# Patient Record
Sex: FEMALE | Race: BLACK OR AFRICAN AMERICAN | NOT HISPANIC OR LATINO | Employment: FULL TIME | URBAN - METROPOLITAN AREA
[De-identification: names, ages, dates, MRNs, and addresses within clinical notes are randomized per-mention and may not be internally consistent; named-entity substitution may affect disease eponyms.]

---

## 2017-01-31 RX ORDER — GABAPENTIN 300 MG/1
CAPSULE ORAL
Qty: 180 CAPSULE | Refills: 3 | Status: SHIPPED | OUTPATIENT
Start: 2017-01-31 | End: 2017-12-14 | Stop reason: SDUPTHER

## 2017-04-21 DIAGNOSIS — K21.9 GASTROESOPHAGEAL REFLUX DISEASE, ESOPHAGITIS PRESENCE NOT SPECIFIED: ICD-10-CM

## 2017-04-21 RX ORDER — OMEPRAZOLE 20 MG/1
CAPSULE, DELAYED RELEASE ORAL
Qty: 90 CAPSULE | Refills: 3 | Status: SHIPPED | OUTPATIENT
Start: 2017-04-21 | End: 2019-08-15 | Stop reason: SDUPTHER

## 2017-06-15 DIAGNOSIS — Z12.31 VISIT FOR SCREENING MAMMOGRAM: Primary | ICD-10-CM

## 2017-06-27 ENCOUNTER — HOSPITAL ENCOUNTER (OUTPATIENT)
Dept: RADIOLOGY | Facility: HOSPITAL | Age: 57
Discharge: HOME | End: 2017-06-27
Attending: SPECIALIST
Payer: COMMERCIAL

## 2017-06-27 VITALS — HEIGHT: 60 IN | WEIGHT: 141 LBS | BODY MASS INDEX: 27.68 KG/M2

## 2017-06-27 DIAGNOSIS — Z12.31 VISIT FOR SCREENING MAMMOGRAM: ICD-10-CM

## 2017-06-27 PROCEDURE — 77067 SCR MAMMO BI INCL CAD: CPT | Mod: TC

## 2017-06-27 PROCEDURE — 77067 SCR MAMMO BI INCL CAD: CPT | Mod: 26,,, | Performed by: RADIOLOGY

## 2017-07-21 ENCOUNTER — TELEPHONE (OUTPATIENT)
Dept: GASTROENTEROLOGY | Facility: CLINIC | Age: 57
End: 2017-07-21

## 2017-07-21 NOTE — TELEPHONE ENCOUNTER
Spoke to Ms. Neal    Reports she has been taking omeprazole x 3 years  States she believes it is giving her softer stools in the past month  Reports after taking it in the morning she also has some abdominal discomfort    Reports 5 soft bowel movements per day with no blood noted  Denies fever/chills/nausea/vomitting/swelling/SOB    Pt reports she has increased her fiber intake with wheat bread and oatmeal    Pt would like to know if she should decrease her fiber or stop taking the omeprazole as she believes it is causing change in her stools.    Offered pt to be seen by DANII, however pt declined and states she only wants to see her doctor (Dr. Davenport).    Next available PM appt booked per pt's request for October 13th at 4pm.      Pt aware Dr. Davenport is out of the office until Monday.   Directions given, Appt slip also mailed.

## 2017-07-21 NOTE — TELEPHONE ENCOUNTER
----- Message from Tamara Yeager MA sent at 7/21/2017  9:59 AM CDT -----  Contact: [phone]/pt req call back ASAP/asked that I make her message urgent  Rice  Needing to discuss changing her dosage of omeprazole (PRILOSEC) 20 MG capsule. It's causing her to have terrible bowel issues.    [phone]/pt req call back ASAP/asked that I make her message urgent

## 2017-07-21 NOTE — TELEPHONE ENCOUNTER
Patient is requesting a call from Dr. Davenport. She believes Omeprazole is causing her stool to become soft. She is very concern about this recent change.    Patient is aware Dr. Davenpotr is on vacation until Monday. She is requesting a call for our .    Message routed to Nora and Dr. Davenport.

## 2017-07-24 NOTE — TELEPHONE ENCOUNTER
She can try to stop the Prilosec, but she may have worsening of her heartburn.  Need to warn her about rebound heartburn when stopping PPIs and may use Zantac or Pepcid instead.

## 2017-07-24 NOTE — TELEPHONE ENCOUNTER
Spoke with patient. Patient was advised she can try to stop taking Prilosec, but her heartburn might get worse. Patient verbalized understanding.    She stopped eating wheat bread over the weekend and she noticed her bowels were not as soft. If her symptoms return, she will try to stop taking Prilosec and take either Zantac or Pepcid.

## 2017-09-18 ENCOUNTER — OFFICE VISIT (OUTPATIENT)
Dept: NEUROLOGY | Facility: CLINIC | Age: 57
End: 2017-09-18
Payer: COMMERCIAL

## 2017-09-18 VITALS
HEART RATE: 71 BPM | HEIGHT: 60 IN | WEIGHT: 133 LBS | DIASTOLIC BLOOD PRESSURE: 64 MMHG | BODY MASS INDEX: 26.11 KG/M2 | SYSTOLIC BLOOD PRESSURE: 112 MMHG

## 2017-09-18 DIAGNOSIS — B02.29 POST HERPETIC NEURALGIA: Primary | ICD-10-CM

## 2017-09-18 PROCEDURE — 99213 OFFICE O/P EST LOW 20 MIN: CPT | Mod: S$GLB,,, | Performed by: NEUROMUSCULOSKELETAL MEDICINE & OMM

## 2017-09-18 PROCEDURE — 99999 PR PBB SHADOW E&M-EST. PATIENT-LVL III: CPT | Mod: PBBFAC,,, | Performed by: NEUROMUSCULOSKELETAL MEDICINE & OMM

## 2017-09-18 PROCEDURE — 3008F BODY MASS INDEX DOCD: CPT | Mod: S$GLB,,, | Performed by: NEUROMUSCULOSKELETAL MEDICINE & OMM

## 2017-09-18 NOTE — PROGRESS NOTES
Progress Notes        Present illness: Patient presents for follow-up for her postherpetic neuralgia of the thoracic spine.  Pain is been quiet as long as she takes the gabapentin on a regular basis.  She does note that if she misses a dose she can tell the difference.  Previous note: 6-20-16: Patient returns for follow-up for postherpetic thoracic neuralgia.  She takes gabapentin 300 mg 3 times a day however notices the pain seems to be coming on sooner than her next  dosage.  She does note that the weather fronts also cause increased pain.     Previous note: 1-25-16: Patient continues with postherpetic thoracic neuralgia pain off and on. She has no problems sleeping at night with the tranquilizers. She takes gabapentin 300 mg 3 times a day however probably needs to increase this dosage by 1 or 2 pills per day if needed. She does complain of intermittent headaches with some photophobia associated with the headaches.        Motor examination: Upper and Lower extremities - Normal and symmetrical;muscle tone was normal ; right-handed    Sensory examination:Upper and lower extremities - Pinprick and soft touch were normal and symmetrical. Vibration sense was normal at the toes for age 15-20 seconds    Deep tendon reflexes were 1-2+ symmetrical. Both plantar responses were flexor    Cerebellar examination upper: Normal finger to nose and rapid alternating movements    Gait: Steady with no ataxia; heel and toe walk normal    Romberg test: negative Tandem gait: Normal    Involuntary movements: Negative    Impression: Right temporal pain-muscle tenderness; Shingles with postherpetic neuralgia right T1-T3 thoracic area; headaches; Meningioma    Recommendations/plan: Continue gabapentin 300 mg 4-6 pills per day if needed.  Discussed varying the dose or using it more frequently every 4-5 hours when the pain seems to return or  the medication wears off.  Follow-up 6 months

## 2017-09-26 ENCOUNTER — TELEPHONE (OUTPATIENT)
Dept: GASTROENTEROLOGY | Facility: CLINIC | Age: 57
End: 2017-09-26

## 2017-09-26 NOTE — TELEPHONE ENCOUNTER
"Returned patient's call.    Patient is calling with complaints of diarrhea that started yesterday. She experienced diarrhea after she ate chicken noodle soup. The diarrhea is "almost a clear color, like the color of the liquid in the chicken noodle soup."     Patient was advised by the pharmacist to take Imodium. She is calling to see what is Dr. Davenport recommendations.   "

## 2017-09-26 NOTE — TELEPHONE ENCOUNTER
----- Message from Yolanda Carranza sent at 9/26/2017 11:14 AM CDT -----  Contact: self - [phone]  Issac - wants to talk to rn - has clear diarrhea and stomach pain - what can she take - please call patient at

## 2017-09-27 NOTE — TELEPHONE ENCOUNTER
Spoke with the patient by phone.  Her diarrhea stopped after taking Imodium.  Doing okay now.  No fevers or abdominal pain at this time.      She will let us know if things change.

## 2017-10-13 ENCOUNTER — OFFICE VISIT (OUTPATIENT)
Dept: GASTROENTEROLOGY | Facility: CLINIC | Age: 57
End: 2017-10-13
Payer: COMMERCIAL

## 2017-10-13 VITALS
HEART RATE: 74 BPM | BODY MASS INDEX: 25.53 KG/M2 | DIASTOLIC BLOOD PRESSURE: 64 MMHG | WEIGHT: 130.06 LBS | HEIGHT: 60 IN | SYSTOLIC BLOOD PRESSURE: 100 MMHG

## 2017-10-13 DIAGNOSIS — K21.9 GASTROESOPHAGEAL REFLUX DISEASE WITHOUT ESOPHAGITIS: ICD-10-CM

## 2017-10-13 DIAGNOSIS — K58.2 IRRITABLE BOWEL SYNDROME WITH BOTH CONSTIPATION AND DIARRHEA: Primary | ICD-10-CM

## 2017-10-13 PROCEDURE — 99999 PR PBB SHADOW E&M-EST. PATIENT-LVL III: CPT | Mod: PBBFAC,,, | Performed by: INTERNAL MEDICINE

## 2017-10-13 PROCEDURE — 99213 OFFICE O/P EST LOW 20 MIN: CPT | Mod: S$GLB,,, | Performed by: INTERNAL MEDICINE

## 2017-10-13 NOTE — PROGRESS NOTES
[facility] Established Patient Visit    Reason for Visit:    Chief Complaint   Patient presents with    Follow-up     IBS and GERD       PCP: LUCRETIA Cohen    HPI:  Val Horvath is a 57 y.o. female here for follow-up of IBS and GERD.  Last seen in September 2016 for the same.  She has lost about 11 lbs since our last visit, on purpose.  Her bowels are okay right now.  She finds that her symptoms are highly related to food intake.  Her bowels are more regular when she eats baked foods and worse with fried foods, grease, or oil.  Constipation not an issue right now.  IBgard helps.  Still on omeprazole, down to one pill daily with heartburn about 2 times per week.  Denies dysphagia.            ROS:  Constitutional: No fevers, chills, No weight loss, normal appetite  GI: see HPI      PMHX:  has a past medical history of AR (allergic rhinitis) (3/10/2014) and Meningioma (3/10/2014).    PSHX:  has a past surgical history that includes right ovary; right CTS; and Oophorectomy.    The patient's social and family histories were reviewed by me and updated in the appropriate section of the electronic medical record.    Review of patient's allergies indicates:   Allergen Reactions    Codeine Itching    Sulfa (sulfonamide antibiotics) Itching       Prior to Admission medications    Medication Sig Start Date End Date Taking? Authorizing Provider   acyclovir (ZOVIRAX) 200 MG capsule Take 400 mg by mouth 2 (two) times daily.  2/24/14  Yes Historical Provider, MD   alprazolam (XANAX) 0.25 MG tablet Take 1 tablet (0.25 mg total) by mouth 3 (three) times daily. 7/2/14  Yes Soila Jefferson NP   fluconazole (DIFLUCAN) 150 MG Tab Take 150 mg by mouth once daily.  6/17/16  Yes Historical Provider, MD   fluticasone (FLONASE) 50 mcg/actuation nasal spray 2 sprays by Nasal route once daily.  1/12/14  Yes Historical Provider, MD   gabapentin (NEURONTIN) 300 MG capsule Take up to 6/ day 1/31/17  Yes  Mike Portillo MD   omeprazole (PRILOSEC) 20 MG capsule take 1 capsule by mouth once daily BEFORE BREAKFAST 4/21/17  Yes Pieter Davenport MD   triamcinolone (NASACORT) 55 mcg nasal inhaler 2 sprays by Nasal route once daily. 6/30/15  Yes Jacob Lazar MD   VENTOLIN HFA 90 mcg/actuation inhaler Inhale 2 puffs into the lungs every 6 (six) hours as needed.  3/23/15  Yes Historical Provider, MD         Objective Findings:  Vital Signs:  /64   Pulse 74   Ht 5' (1.524 m)   Wt 59 kg (130 lb 1.1 oz)   BMI 25.40 kg/m²  Body mass index is 25.4 kg/m².    Physical Exam:  General Appearance:  Well appearing in no acute distress, appears stated age  Head:  Normocephalic, atraumatic  Eyes:  No scleral icterus or pallor, EOMI        Labs:  Lab Results   Component Value Date    WBC 4.8 03/15/2014    HGB 13.6 03/15/2014    HCT 41.2 03/15/2014    MCV 90.1 03/15/2014    RDW 13.2 03/15/2014     03/15/2014    LYMPH 1,464 03/15/2014    LYMPH 30.5 03/15/2014    MONO 62 (L) 03/15/2014    MONO 1.3 03/15/2014    EOS 43 03/15/2014    BASO 24 03/15/2014     Lab Results   Component Value Date     03/15/2014    K 4.4 03/15/2014     03/15/2014    CO2 18 (L) 03/15/2014    GLU 76 03/15/2014    BUN 16 03/15/2014    CREATININE 0.74 03/15/2014    CALCIUM 9.7 03/15/2014    PROT 7.2 03/15/2014    ALBUMIN 4.4 03/15/2014    BILITOT 0.2 03/15/2014    ALKPHOS 81 03/15/2014    AST 17 03/15/2014    ALT 11 03/15/2014                   Assessment:  Val Horvath is a 57 y.o. female here with:  1. IBS with constipation and diarrhea - dietary modification helping. IBgard helps.  Bentyle also seems to help when acute symptoms occur.    2. GERD controlled with daily omeprazole.       Recommendations:  1. Continue omeprazole  2. Discussed lifestyle changes for GERD  3. Continue IBgard and dietary modification  4. Bentyl as needed.    Follow-up 1 year.        Pieter Davenport MD

## 2017-10-27 ENCOUNTER — TELEPHONE (OUTPATIENT)
Dept: GASTROENTEROLOGY | Facility: CLINIC | Age: 57
End: 2017-10-27

## 2017-10-27 NOTE — TELEPHONE ENCOUNTER
----- Message from Lanette Laura sent at 10/27/2017  9:26 AM CDT -----  Contact: Self- [phone]  Issac- pt called and stated her stomach is full of gas- wanted to know if Dr. Davenport could call her something in- please call pt back at [phone]

## 2017-11-02 ENCOUNTER — TELEPHONE (OUTPATIENT)
Dept: GASTROENTEROLOGY | Facility: CLINIC | Age: 57
End: 2017-11-02

## 2017-11-02 NOTE — TELEPHONE ENCOUNTER
----- Message from Yolanda Carranza sent at 11/2/2017  9:46 AM CDT -----  Contact: self - [phone]  Issac - the meds you told her to get are not helping her stomach - wants to talk to re rice or jonas - please call patient at  [phone]

## 2017-11-02 NOTE — TELEPHONE ENCOUNTER
Returned patient's call.     Patient is calling to inform Dr. Davenport she followed his recommendations and the charcoal caps are not working. Her symptoms are not improving.    Message routed to Dr. Davenport.

## 2017-12-04 ENCOUNTER — TELEPHONE (OUTPATIENT)
Dept: GASTROENTEROLOGY | Facility: CLINIC | Age: 57
End: 2017-12-04

## 2017-12-04 NOTE — TELEPHONE ENCOUNTER
----- Message from Madison Mckinney MA sent at 12/4/2017  4:11 PM CST -----  Contact: self  [phone]  States she was here last month and states her stomach is hurting her now and she wants an appointment asap.  States she needs a call back asap.

## 2017-12-14 ENCOUNTER — OFFICE VISIT (OUTPATIENT)
Dept: GASTROENTEROLOGY | Facility: CLINIC | Age: 57
End: 2017-12-14
Payer: COMMERCIAL

## 2017-12-14 VITALS
SYSTOLIC BLOOD PRESSURE: 108 MMHG | HEIGHT: 60 IN | DIASTOLIC BLOOD PRESSURE: 69 MMHG | WEIGHT: 132.06 LBS | BODY MASS INDEX: 25.93 KG/M2 | HEART RATE: 84 BPM

## 2017-12-14 DIAGNOSIS — K58.0 IRRITABLE BOWEL SYNDROME WITH DIARRHEA: ICD-10-CM

## 2017-12-14 DIAGNOSIS — K21.9 GASTROESOPHAGEAL REFLUX DISEASE WITHOUT ESOPHAGITIS: Primary | ICD-10-CM

## 2017-12-14 DIAGNOSIS — R14.0 BLOATING: ICD-10-CM

## 2017-12-14 PROCEDURE — 99999 PR PBB SHADOW E&M-EST. PATIENT-LVL III: CPT | Mod: PBBFAC,,, | Performed by: INTERNAL MEDICINE

## 2017-12-14 PROCEDURE — 99213 OFFICE O/P EST LOW 20 MIN: CPT | Mod: S$GLB,,, | Performed by: INTERNAL MEDICINE

## 2017-12-14 RX ORDER — SIMETHICONE 125 MG
125 TABLET,CHEWABLE ORAL EVERY 6 HOURS PRN
COMMUNITY
End: 2018-01-25

## 2017-12-14 NOTE — PROGRESS NOTES
[facility] Established Patient Visit    Reason for Visit:    Chief Complaint   Patient presents with    Follow-up       PCP: LUCRETIA Cohen    HPI:  Val Horvath is a 57 y.o. female here for follow-up of bloating and gas.  I saw her in October for IBS and GERD.  She was doing well at that time.  After our last visit, she developed a gastroenteritis that she feels may have been food poisoning.  Symptoms started after that.  IBgard helps.  Her bowel movements are daily, soft to loose.  Denies blood in the stool.  Denies cramping or nausea.  Her heartburn is a little worse and her throat has been sore.  She cut down to one pill of omeprazole a year ago and feels that this may have contributed.  She has daily symptoms, worse after eating.            ROS:  Constitutional: No fevers, chills, No weight loss  GI: see HPI        PMHX:  has a past medical history of AR (allergic rhinitis) (3/10/2014) and Meningioma (3/10/2014).    PSHX:  has a past surgical history that includes right ovary; right CTS; and Oophorectomy.    The patient's social and family histories were reviewed by me and updated in the appropriate section of the electronic medical record.    Review of patient's allergies indicates:   Allergen Reactions    Codeine Itching    Sulfa (sulfonamide antibiotics) Itching       Prior to Admission medications    Medication Sig Start Date End Date Taking? Authorizing Provider   acyclovir (ZOVIRAX) 200 MG capsule Take 400 mg by mouth 2 (two) times daily.  2/24/14  Yes Historical Provider, MD   alprazolam (XANAX) 0.25 MG tablet Take 1 tablet (0.25 mg total) by mouth 3 (three) times daily. 7/2/14  Yes Soila Jefferson NP   fluconazole (DIFLUCAN) 150 MG Tab Take 150 mg by mouth once daily.  6/17/16  Yes Historical Provider, MD   gabapentin (NEURONTIN) 300 MG capsule Take up to 6/ day 1/31/17  Yes Mike Portillo MD   omeprazole (PRILOSEC) 20 MG capsule take 1 capsule by mouth once  daily BEFORE BREAKFAST 4/21/17  Yes Pieter Davenport MD   PEPPERMINT OIL (IBGARD ORAL) Take by mouth.   Yes Historical Provider, MD   simethicone (MYLICON) 125 MG chewable tablet Take 125 mg by mouth every 6 (six) hours as needed for Flatulence.   Yes Historical Provider, MD   fluticasone (FLONASE) 50 mcg/actuation nasal spray 2 sprays by Nasal route once daily.  1/12/14   Historical Provider, MD   rifAXIMin (XIFAXAN) 550 mg Tab Take 1 tablet (550 mg total) by mouth 3 (three) times daily. 12/14/17 12/28/17  Pieter Davenport MD   triamcinolone (NASACORT) 55 mcg nasal inhaler 2 sprays by Nasal route once daily. 6/30/15   Jacob Lazar MD   VENTOLIN HFA 90 mcg/actuation inhaler Inhale 2 puffs into the lungs every 6 (six) hours as needed.  3/23/15   Historical Provider, MD         Objective Findings:  Vital Signs:  /69   Pulse 84   Ht 5' (1.524 m)   Wt 59.9 kg (132 lb 0.9 oz)   BMI 25.79 kg/m²  Body mass index is 25.79 kg/m².    Physical Exam:  General Appearance:  Well appearing in no acute distress, appears stated age  Head:  Normocephalic, atraumatic  Eyes:  No scleral icterus or pallor, EOMI        Labs:  Lab Results   Component Value Date    WBC 4.8 03/15/2014    HGB 13.6 03/15/2014    HCT 41.2 03/15/2014    MCV 90.1 03/15/2014    RDW 13.2 03/15/2014     03/15/2014    LYMPH 1,464 03/15/2014    LYMPH 30.5 03/15/2014    MONO 62 (L) 03/15/2014    MONO 1.3 03/15/2014    EOS 43 03/15/2014    BASO 24 03/15/2014     Lab Results   Component Value Date     03/15/2014    K 4.4 03/15/2014     03/15/2014    CO2 18 (L) 03/15/2014    GLU 76 03/15/2014    BUN 16 03/15/2014    CREATININE 0.74 03/15/2014    CALCIUM 9.7 03/15/2014    PROT 7.2 03/15/2014    ALBUMIN 4.4 03/15/2014    BILITOT 0.2 03/15/2014    ALKPHOS 81 03/15/2014    AST 17 03/15/2014    ALT 11 03/15/2014                 Assessment:  Val JYOTHI Horvath is a 57 y.o. female here with:  1. IBS with diarrhea.  Symptoms likely  exacerbated by her recent gastroenteritis and may have caused a dysbiosis.    2. GERD  3. Bloating      Recommendations:  1. Rifaximin 550 mg PO tid x 14 days  2. BID omeprazole 20 mg x 2 weeks then return to once daily    She will update us on symptoms in 2 weeks        Pieter Davenport MD

## 2017-12-15 ENCOUNTER — TELEPHONE (OUTPATIENT)
Dept: GASTROENTEROLOGY | Facility: CLINIC | Age: 57
End: 2017-12-15

## 2017-12-15 RX ORDER — GABAPENTIN 300 MG/1
CAPSULE ORAL
Qty: 180 CAPSULE | Refills: 3 | Status: SHIPPED | OUTPATIENT
Start: 2017-12-15 | End: 2018-12-02 | Stop reason: SDUPTHER

## 2017-12-15 NOTE — TELEPHONE ENCOUNTER
----- Message from Tamara Yeager MA sent at 12/15/2017  2:54 PM CST -----  Contact: [phone]  Issac  checking on the status of a PA on a new medication from Dr Davenport. She said the paper work has already been sent over. She did not know the name of the medication but that [facility] was aware of it.  [phone]

## 2017-12-18 ENCOUNTER — TELEPHONE (OUTPATIENT)
Dept: GASTROENTEROLOGY | Facility: CLINIC | Age: 57
End: 2017-12-18

## 2017-12-18 NOTE — TELEPHONE ENCOUNTER
----- Message from Reta Mosher sent at 12/18/2017  8:52 AM CST -----  Contact:  Chris Villarreal:[phone]  Cherelle with [employer] called and states the Rx  rifAXIMin (XIFAXAN) 550 mg Tab is not covered by pt insurance. Cherelle states she would like to know if something else could be called in.

## 2017-12-18 NOTE — TELEPHONE ENCOUNTER
Spoke with [facility] pharmacist. She stated Rifaximin is covered and does not need a PA. [facility] pharmacy processed the prescription wrong. She stated [facility] prescription and if they are still having trouble they need to contact express help desk to help process the prescription.    Spoke with pharmacist at [facility]. She verbalized understanding and will try to process the prescription again and if she is unable to she will contact the helpdesk.    Patient notified and message sent to Dr. Davenport.

## 2018-01-24 RX ORDER — RIFAXIMIN 550 MG/1
550 TABLET ORAL 3 TIMES DAILY
Refills: 0 | COMMUNITY
Start: 2017-12-14 | End: 2018-01-25

## 2018-01-24 RX ORDER — DICYCLOMINE HYDROCHLORIDE 10 MG/1
10 CAPSULE ORAL 3 TIMES DAILY
Refills: 0 | COMMUNITY
Start: 2017-12-18 | End: 2018-12-18

## 2018-01-24 RX ORDER — VALACYCLOVIR HYDROCHLORIDE 500 MG/1
500 TABLET, FILM COATED ORAL DAILY
Refills: 0 | COMMUNITY
Start: 2017-12-18 | End: 2019-04-29 | Stop reason: SDUPTHER

## 2018-01-25 ENCOUNTER — OFFICE VISIT (OUTPATIENT)
Dept: FAMILY MEDICINE | Facility: CLINIC | Age: 58
End: 2018-01-25
Attending: FAMILY MEDICINE
Payer: COMMERCIAL

## 2018-01-25 VITALS
OXYGEN SATURATION: 97 % | HEART RATE: 79 BPM | HEIGHT: 60 IN | SYSTOLIC BLOOD PRESSURE: 106 MMHG | DIASTOLIC BLOOD PRESSURE: 66 MMHG | WEIGHT: 131.38 LBS | BODY MASS INDEX: 25.79 KG/M2

## 2018-01-25 DIAGNOSIS — F41.1 GENERALIZED ANXIETY DISORDER WITH PANIC ATTACKS: Primary | ICD-10-CM

## 2018-01-25 DIAGNOSIS — K21.9 GASTROESOPHAGEAL REFLUX DISEASE WITHOUT ESOPHAGITIS: ICD-10-CM

## 2018-01-25 DIAGNOSIS — A60.04 HERPES SIMPLEX VULVOVAGINITIS: ICD-10-CM

## 2018-01-25 DIAGNOSIS — J30.2 CHRONIC SEASONAL ALLERGIC RHINITIS, UNSPECIFIED TRIGGER: ICD-10-CM

## 2018-01-25 DIAGNOSIS — K58.0 IRRITABLE BOWEL SYNDROME WITH DIARRHEA: ICD-10-CM

## 2018-01-25 DIAGNOSIS — Z11.59 NEED FOR HEPATITIS C SCREENING TEST: ICD-10-CM

## 2018-01-25 DIAGNOSIS — Z80.3 FAMILY HISTORY OF BREAST CANCER IN SISTER: ICD-10-CM

## 2018-01-25 DIAGNOSIS — F41.0 GENERALIZED ANXIETY DISORDER WITH PANIC ATTACKS: Primary | ICD-10-CM

## 2018-01-25 DIAGNOSIS — D32.9 MENINGIOMA: ICD-10-CM

## 2018-01-25 DIAGNOSIS — Z00.00 LABORATORY EXAM ORDERED AS PART OF ROUTINE GENERAL MEDICAL EXAMINATION: ICD-10-CM

## 2018-01-25 DIAGNOSIS — B02.29 POST HERPETIC NEURALGIA: ICD-10-CM

## 2018-01-25 PROCEDURE — 99205 OFFICE O/P NEW HI 60 MIN: CPT | Mod: 25,S$GLB,, | Performed by: FAMILY MEDICINE

## 2018-01-25 PROCEDURE — 99999 PR PBB SHADOW E&M-EST. PATIENT-LVL IV: CPT | Mod: PBBFAC,,, | Performed by: FAMILY MEDICINE

## 2018-01-25 PROCEDURE — 90715 TDAP VACCINE 7 YRS/> IM: CPT | Mod: S$GLB,,, | Performed by: FAMILY MEDICINE

## 2018-01-25 PROCEDURE — 90471 IMMUNIZATION ADMIN: CPT | Mod: S$GLB,,, | Performed by: FAMILY MEDICINE

## 2018-01-25 NOTE — PROGRESS NOTES
Patient was given Tdap IM in Left Deltoid as per orders from Dr. Schroeder. Aseptic tech used and pt tolerated well. Pt was monitored for 15 mins with no reaction noted.

## 2018-01-25 NOTE — PROGRESS NOTES
Subjective:       Patient ID: Val Horvath is a 57 y.o. female.    Chief Complaint: Annual Exam    HPI     The patient presents today for first visit with me.  She is a former patient of Dr. Berenice Cohen.    Patient Active Problem List   Diagnosis    Generalized headaches    Post herpetic neuralgia    Meningioma    Chronic seasonal allergic rhinitis    GERD (gastroesophageal reflux disease)    IBS (irritable bowel syndrome)    Generalized anxiety disorder with panic attacks    Herpes simplex vulvovaginitis    Family history of breast cancer in sister       Past Surgical History:   Procedure Laterality Date    CARPAL TUNNEL RELEASE Right 1990s    OOPHORECTOMY Right 1970s         Current Outpatient Prescriptions:     alprazolam (XANAX) 0.25 MG tablet, Take 1 tablet (0.25 mg total) by mouth 3 (three) times daily., Disp: 30 tablet, Rfl: 0    dicyclomine (BENTYL) 10 MG capsule, Take 10 mg by mouth 3 (three) times daily., Disp: , Rfl: 0    fluticasone (FLONASE) 50 mcg/actuation nasal spray, 2 sprays by Nasal route once daily. , Disp: , Rfl:     gabapentin (NEURONTIN) 300 MG capsule, take UP TO 6 CAPSULES A DAY, Disp: 180 capsule, Rfl: 3    omeprazole (PRILOSEC) 20 MG capsule, take 1 capsule by mouth once daily BEFORE BREAKFAST, Disp: 90 capsule, Rfl: 3    PEPPERMINT OIL (IBGARD ORAL), Take by mouth., Disp: , Rfl:     triamcinolone (NASACORT) 55 mcg nasal inhaler, 2 sprays by Nasal route once daily., Disp: 16.9 g, Rfl: 0    valACYclovir (VALTREX) 500 MG tablet, Take 500 mg by mouth once daily., Disp: , Rfl: 0    Review of patient's allergies indicates:   Allergen Reactions    Codeine Itching    Sulfa (sulfonamide antibiotics) Itching       Family History   Problem Relation Age of Onset    Heart disease Mother 65     MI    Hypertension Mother     Bladder Cancer Father      bladder    Breast cancer Sister 45    No Known Problems Brother        Social History     Social History    Marital  status: Single     Spouse name: N/A    Number of children: 1    Years of education: N/A     Occupational History    [employer]     Social History Main Topics    Smoking status: Never Smoker    Smokeless tobacco: Not on file    Alcohol use No    Drug use: Unknown    Sexual activity: Not on file     Other Topics Concern    Not on file     Social History Narrative    The patient does exercise regularly (bikes QW-BIW).      She is not satisfied with weight.    Rates diet as good.    She does drink at least 1/2 gallon water daily.    She drinks 0 coffee/tea/caffeine-containing soft drinks daily.    Total sleep time at night is 8 hours.    She works 40 hours per week.    She does wear seat belts.    Hobbies include none.           OB History      Para Term  AB Living    1 1 1          SAB TAB Ectopic Multiple Live Births                     Obstetric Comments    Menarche age 13.  LMP age 50.  First child born age 21.  History of abnormal PAP smear: NO.  History of abnormal mammogram: NO.  History of sexually transmitted disease:  YES: Herpes.               Patient Care Team:  Mayco Schroeder Jr., MD as PCP - General (Family Medicine)  Ravi Briones MD as Consulting Physician (Gastroenterology)    Review of Systems   Constitutional: Negative for fatigue and unexpected weight change.   HENT: Negative for ear discharge, ear pain, hearing loss, tinnitus and voice change.    Respiratory: Positive for chest tightness (with panic attacks). Negative for cough and shortness of breath.    Cardiovascular: Negative for chest pain, palpitations and leg swelling.   Gastrointestinal: Negative for abdominal pain, blood in stool, constipation, diarrhea, nausea and vomiting.   Genitourinary: Negative for difficulty urinating, dyspareunia, dysuria, frequency and hematuria.   Musculoskeletal: Negative for arthralgias, back pain and myalgias.   Skin: Negative for rash.   Neurological:  Positive for light-headedness. Negative for dizziness, weakness and headaches.   Hematological: Does not bruise/bleed easily.   Psychiatric/Behavioral: Negative for dysphoric mood and sleep disturbance. The patient is nervous/anxious.        Objective:      Physical Exam   Constitutional: She is oriented to person, place, and time. She appears well-developed and well-nourished. She is cooperative.   HENT:   Head: Normocephalic and atraumatic.   Nose: Nose normal.   Mouth/Throat: Oropharynx is clear and moist and mucous membranes are normal.   Eyes: Conjunctivae are normal. No scleral icterus.   Neck: Neck supple. No JVD present. Carotid bruit is not present. No thyromegaly present.   Cardiovascular: Normal rate, regular rhythm, normal heart sounds and normal pulses.  Exam reveals no gallop and no friction rub.    No murmur heard.  Pulmonary/Chest: Effort normal. She has no wheezes. She has no rhonchi. She has rales in the right lower field.   Abdominal: Soft. Bowel sounds are normal. She exhibits no distension and no mass. There is no splenomegaly or hepatomegaly. There is no tenderness.   Musculoskeletal: Normal range of motion. She exhibits no edema or tenderness.   Lymphadenopathy:     She has no cervical adenopathy.     She has no axillary adenopathy.   Neurological: She is alert and oriented to person, place, and time. She has normal strength and normal reflexes. No cranial nerve deficit or sensory deficit.   Skin: Skin is warm and dry.   Psychiatric: She has a normal mood and affect. Her speech is normal.   Vitals reviewed.        Assessment:       1. Generalized anxiety disorder with panic attacks    2. Herpes simplex vulvovaginitis    3. Post herpetic neuralgia    4. Irritable bowel syndrome with diarrhea    5. Meningioma    6. Gastroesophageal reflux disease without esophagitis    7. Chronic seasonal allergic rhinitis, unspecified trigger    8. Need for hepatitis C screening test        Plan:       Patient  "seeing multiple specialists for primary care issues.  Long discussion with patient concerning my role in her care.  Discussed routine examinations and screenings.  Discussed with patient the importance of lifestyle modifications, including well-balanced diet and moderate exercise regimen, in reducing risk for cardiovascular/cerebrovascular disease and diabetes.  RTC for labs.  Obtain prior records from [facility] and [facility].    CXR.  Continue with OTC preparations; restart Flonase.  Further recommendations to follow after above.      "This note will not be shared with the patient."  "

## 2018-01-29 ENCOUNTER — TELEPHONE (OUTPATIENT)
Dept: FAMILY MEDICINE | Facility: CLINIC | Age: 58
End: 2018-01-29

## 2018-01-29 LAB
ALBUMIN SERPL-MCNC: 4.4 G/DL (ref 3.6–5.1)
ALBUMIN/GLOB SERPL: 1.5 (CALC) (ref 1–2.5)
ALP SERPL-CCNC: 76 U/L (ref 33–130)
ALT SERPL-CCNC: 13 U/L (ref 6–29)
AST SERPL-CCNC: 17 U/L (ref 10–35)
BASOPHILS # BLD AUTO: 39 CELLS/UL (ref 0–200)
BASOPHILS NFR BLD AUTO: 0.6 %
BILIRUB SERPL-MCNC: 0.4 MG/DL (ref 0.2–1.2)
BUN SERPL-MCNC: 16 MG/DL (ref 7–25)
BUN/CREAT SERPL: NORMAL (CALC) (ref 6–22)
CALCIUM SERPL-MCNC: 9.7 MG/DL (ref 8.6–10.4)
CHLORIDE SERPL-SCNC: 107 MMOL/L (ref 98–110)
CHOLEST SERPL-MCNC: 191 MG/DL
CHOLEST/HDLC SERPL: 2.2 (CALC)
CO2 SERPL-SCNC: 29 MMOL/L (ref 20–31)
CREAT SERPL-MCNC: 0.72 MG/DL (ref 0.5–1.05)
EOSINOPHIL # BLD AUTO: 39 CELLS/UL (ref 15–500)
EOSINOPHIL NFR BLD AUTO: 0.6 %
ERYTHROCYTE [DISTWIDTH] IN BLOOD BY AUTOMATED COUNT: 13 % (ref 11–15)
GFR SERPL CREATININE-BSD FRML MDRD: 93 ML/MIN/1.73M2
GLOBULIN SER CALC-MCNC: 2.9 G/DL (CALC) (ref 1.9–3.7)
GLUCOSE SERPL-MCNC: 84 MG/DL (ref 65–99)
HCT VFR BLD AUTO: 39.9 % (ref 35–45)
HCV AB S/CO SERPL IA: 0.02
HCV AB SERPL QL IA: NORMAL
HDLC SERPL-MCNC: 86 MG/DL
HGB BLD-MCNC: 13.1 G/DL (ref 11.7–15.5)
LDLC SERPL CALC-MCNC: 87 MG/DL (CALC)
LYMPHOCYTES # BLD AUTO: 1372 CELLS/UL (ref 850–3900)
LYMPHOCYTES NFR BLD AUTO: 21.1 %
MCH RBC QN AUTO: 28.2 PG (ref 27–33)
MCHC RBC AUTO-ENTMCNC: 32.8 G/DL (ref 32–36)
MCV RBC AUTO: 86 FL (ref 80–100)
MONOCYTES # BLD AUTO: 306 CELLS/UL (ref 200–950)
MONOCYTES NFR BLD AUTO: 4.7 %
NEUTROPHILS # BLD AUTO: 4745 CELLS/UL (ref 1500–7800)
NEUTROPHILS NFR BLD AUTO: 73 %
NONHDLC SERPL-MCNC: 105 MG/DL (CALC)
PLATELET # BLD AUTO: 293 THOUSAND/UL (ref 140–400)
PMV BLD REES-ECKER: 10.6 FL (ref 7.5–12.5)
POTASSIUM SERPL-SCNC: 4.3 MMOL/L (ref 3.5–5.3)
PROT SERPL-MCNC: 7.3 G/DL (ref 6.1–8.1)
RBC # BLD AUTO: 4.64 MILLION/UL (ref 3.8–5.1)
SODIUM SERPL-SCNC: 144 MMOL/L (ref 135–146)
T4 FREE SERPL-MCNC: 1 NG/DL (ref 0.8–1.8)
TRIGL SERPL-MCNC: 89 MG/DL
TSH SERPL-ACNC: 1.2 MIU/L (ref 0.4–4.5)
WBC # BLD AUTO: 6.5 THOUSAND/UL (ref 3.8–10.8)

## 2018-01-29 NOTE — TELEPHONE ENCOUNTER
Please inform patient of the following:  Diabetes, HepC and thyroid screenings are negative.  Liver and kidney function are normal.  Lipid profile reveals acceptable cholesterol levels.  Blood count reveals no anemia.    Awaiting records from previous physicians.

## 2018-01-30 NOTE — TELEPHONE ENCOUNTER
Spoke with patient to discuss test results. Patient states she will come in to sign medical authorization release form.

## 2018-04-20 ENCOUNTER — OFFICE VISIT (OUTPATIENT)
Dept: NEUROLOGY | Facility: CLINIC | Age: 58
End: 2018-04-20
Payer: COMMERCIAL

## 2018-04-20 VITALS
WEIGHT: 132 LBS | SYSTOLIC BLOOD PRESSURE: 109 MMHG | HEART RATE: 75 BPM | BODY MASS INDEX: 25.91 KG/M2 | DIASTOLIC BLOOD PRESSURE: 63 MMHG | HEIGHT: 60 IN

## 2018-04-20 DIAGNOSIS — B02.29 POST HERPETIC NEURALGIA: Primary | ICD-10-CM

## 2018-04-20 PROCEDURE — 99999 PR PBB SHADOW E&M-EST. PATIENT-LVL III: CPT | Mod: PBBFAC,,, | Performed by: NEUROMUSCULOSKELETAL MEDICINE & OMM

## 2018-04-20 PROCEDURE — 99213 OFFICE O/P EST LOW 20 MIN: CPT | Mod: S$GLB,,, | Performed by: NEUROMUSCULOSKELETAL MEDICINE & OMM

## 2018-04-20 NOTE — PROGRESS NOTES
Progress Notes         Present illness: Patient presents for follow-up for postherpetic neuralgia.  She is taking gabapentin 300 mg to-3 tablets per day with good relief.  Occasionally she has very little pain but sometimes the pain will be 8/10 when it does come.  We discussed altering the dose with a baseline dose of twice a day with an option to increase by 1 or 2 pills extra particularly when weather fronts come.  Previous note: 9-18-17: Patient presents for follow-up for her postherpetic neuralgia.  She generally takes to-3 gabapentin 300 mg per day occasionally she needs to take more.  She does notice a flareup of the pain with barometric pressure changes from weather fronts.  She has had occasional chest pain and anxiety jitteriness if she takes the gabapentin during the day with her nerve medicine.      Patient presents for follow-up for her postherpetic neuralgia of the thoracic spine.  Pain is been quiet as long as she takes the gabapentin on a regular basis.  She does note that if she misses a dose she can tell the difference.  Previous note: 6-20-16: Patient returns for follow-up for postherpetic thoracic neuralgia.  She takes gabapentin 300 mg 3 times a day however notices the pain seems to be coming on sooner than her next  dosage.  She does note that the weather fronts also cause increased pain.     Previous note: 1-25-16: Patient continues with postherpetic thoracic neuralgia pain off and on. She has no problems sleeping at night with the tranquilizers. She takes gabapentin 300 mg 3 times a day however probably needs to increase this dosage by 1 or 2 pills per day if needed. She does complain of intermittent headaches with some photophobia associated with the headaches.        Motor examination: Upper and Lower extremities - Normal and symmetrical;muscle tone was normal ; right-handed    Sensory examination:Upper and lower extremities - Pinprick and soft touch were normal and symmetrical. Vibration  sense was normal at the toes for age 15-20 seconds    Deep tendon reflexes were 1-2+ symmetrical. Both plantar responses were flexor    Cerebellar examination upper: Normal finger to nose and rapid alternating movements    Gait: Steady with no ataxia; heel and toe walk normal    Romberg test: negative Tandem gait: Normal    Involuntary movements: Negative    Impression: Right temporal pain-muscle tenderness; Shingles with postherpetic neuralgia right T1-T3 thoracic area; headaches; Meningioma    Recommendations/plan: Continue gabapentin 300 mg 2-6 pills per day if needed.  Discussed varying the dose or using it more frequently every 4-5 hours when the pain seems to return or  the medication wears off.  Suggested avoiding taking the medicine during the day with an anxiety medicine.  Also suggested anticipating weather fronts and taking extra gabapentin prior to onset of the pain in that situation.  Follow-up 6 months

## 2018-05-07 ENCOUNTER — TELEPHONE (OUTPATIENT)
Dept: FAMILY MEDICINE | Facility: CLINIC | Age: 58
End: 2018-05-07

## 2018-05-07 DIAGNOSIS — F41.0 PANIC: ICD-10-CM

## 2018-05-07 RX ORDER — ALPRAZOLAM 0.25 MG/1
0.25 TABLET ORAL 3 TIMES DAILY PRN
Qty: 15 TABLET | Refills: 0 | Status: SHIPPED | OUTPATIENT
Start: 2018-05-07 | End: 2018-12-18

## 2018-05-07 NOTE — TELEPHONE ENCOUNTER
Spoke with the patient in regards to her phone call. The patient states Dr. Schroeder received her records from her previous physician.     Therefore she would like to know if Dr. Schroeder will refill the alprazolam Rx? Patient states her previous physician was the prescribing provider.

## 2018-05-07 NOTE — TELEPHONE ENCOUNTER
----- Message from Madison Hicks sent at 5/7/2018 10:25 AM CDT -----  Contact: brian            Name of Who is Calling: brian      What is the request in detail: pt needs information about some refills. Please call pt      Can the clinic reply by [facility]: no      What Number [facility] Back [facility]: [phone]

## 2018-05-07 NOTE — TELEPHONE ENCOUNTER
Medication will be refilled.  However, I believe that a daily medication will better control her anxiety and reduce frequency of panic attacks.  We can discuss at her next visit.

## 2018-05-12 ENCOUNTER — OFFICE VISIT (OUTPATIENT)
Dept: URGENT CARE | Facility: CLINIC | Age: 58
End: 2018-05-12
Payer: COMMERCIAL

## 2018-05-12 VITALS
WEIGHT: 132 LBS | HEART RATE: 75 BPM | TEMPERATURE: 97 F | BODY MASS INDEX: 25.91 KG/M2 | DIASTOLIC BLOOD PRESSURE: 68 MMHG | HEIGHT: 60 IN | SYSTOLIC BLOOD PRESSURE: 107 MMHG | OXYGEN SATURATION: 100 % | RESPIRATION RATE: 18 BRPM

## 2018-05-12 DIAGNOSIS — R42 DIZZINESS: Primary | ICD-10-CM

## 2018-05-12 LAB
GLUCOSE SERPL-MCNC: 97 MG/DL (ref 70–110)
POC ANION GAP: 14 MMOL/L (ref 10–20)
POC BUN: 23 MMOL/L (ref 8–26)
POC CHLORIDE: 106 MMOL/L (ref 98–109)
POC CREATININE: 0.9 MG/DL (ref 0.6–1.3)
POC HEMATOCRIT: 38 %PCV (ref 37–47)
POC HEMOGLOBIN: 12.9 G/DL (ref 12.5–16)
POC ICA: 1.26 MMOL/L (ref 1.12–1.32)
POC POTASSIUM: 3.9 MMOL/L (ref 3.5–4.9)
POC SODIUM: 143 MMOL/L (ref 138–146)
POC TCO2: 28 MMOL/L (ref 24–29)

## 2018-05-12 PROCEDURE — 80047 BASIC METABLC PNL IONIZED CA: CPT | Mod: QW,S$GLB,, | Performed by: EMERGENCY MEDICINE

## 2018-05-12 PROCEDURE — 99215 OFFICE O/P EST HI 40 MIN: CPT | Mod: S$GLB,,, | Performed by: EMERGENCY MEDICINE

## 2018-05-12 PROCEDURE — 3008F BODY MASS INDEX DOCD: CPT | Mod: CPTII,S$GLB,, | Performed by: EMERGENCY MEDICINE

## 2018-05-12 RX ORDER — MECLIZINE HYDROCHLORIDE 25 MG/1
25 TABLET ORAL EVERY 6 HOURS
Qty: 30 TABLET | Refills: 0 | Status: SHIPPED | OUTPATIENT
Start: 2018-05-12 | End: 2018-05-19

## 2018-05-12 NOTE — PATIENT INSTRUCTIONS
Go to the Emergency Room if symptoms or condition worsens in any way    Zyrtec, Claritin, or Allegra OTC as directed for the next 7 days    Flonase OTC as directed for the next 7 days    Salt Water Nasal Spray OTC 3x/day for the next 7 days      Follow up with Primary Care Provider in 5-7 days    Dizziness (Uncertain Cause)  Dizziness is a common symptom. It may be described as lightheadedness, spinning, or feeling like you are going to faint. Dizziness can have many causes.  Be sure to tell the healthcare provider about:  · All medicines you take, including prescription, over-the-counter, herbs, and supplements  · Any other symptoms you have  · Any health problems you are being treated for  · Anything that causes the dizziness to get worse or better  Today's exam did not show an exact cause for your dizziness. Other tests may be needed. Follow up with your healthcare provider.  Home care  · Dizziness that occurs with sudden standing may be a sign of mild dehydration. Drink extra fluids for the next few days.  · If you recently started a new medicine, stopped a medicine, or had the dose of a current medicine changed, talk with the prescribing healthcare provider. Your medicine plan may need adjustment.  · If dizziness lasts more than a few seconds, sit or lie down until it passes. This may help prevent injury in case you pass out.  · Do not drive or use power tools or dangerous equipment until you have had no dizziness for at least 48 hours.  Follow-up care  Follow up with your healthcare provider for further evaluation within the next 7 days or as advised.  When to seek medical advice  Call your healthcare provider for any of the following:  · Worsening of symptoms or new symptoms  · Passing out or seizure  · Repeated vomiting  · Headache  · Palpitations (the sense that your heart is fluttering or beating fast or hard)  · Shortness of breath  · Blood in vomit or stool (black or red color)  · Weakness of an arm or  leg or one side of the face  · Vision or hearing changes  · Trouble walking or speaking  · Chest, arm, neck, back, or jaw pain  Date Last Reviewed: 8/23/2015  © 6766-0157 [employer]. [address] [place] [address], [place] [address]. All rights reserved. This information is not intended as a substitute for professional medical care. Always follow your healthcare professional's instructions.

## 2018-05-12 NOTE — PROGRESS NOTES
Subjective:       Patient ID: Valbob Horvath is a 57 y.o. female.    Vitals:    05/12/18 1351 05/12/18 1355 05/12/18 1356   BP: 107/70 107/71 107/68   Pulse: 89 80 75   Resp: 18     Temp: 97.1 °F (36.2 °C)     SpO2: 100%     Weight: 59.9 kg (132 lb)     Height: 5' (1.524 m)         Chief Complaint: Dizziness (1 week now )    LAST PCP VISIT JAN 2018      Dizziness:   Chronicity:  New  Onset:  In the past 7 days  Progression since onset:  Unchanged  Frequency:  Every few hours  Pain Scale:  0/10  Severity:  Mild  Duration:  1 minute   Associated symptoms: headaches and light-headedness.no fever, no nausea, no vomiting and no chest pain.  Aggravated by:  Nothing  Treatments tried:  Nothingno strokes, no head trauma, no balance testing, no ear trauma, no head trauma, no ear infections and no CT head.    Review of Systems   Constitution: Negative for chills and fever.   HENT: Positive for congestion. Negative for sore throat.    Eyes: Negative for blurred vision.   Cardiovascular: Negative for chest pain.   Respiratory: Negative for shortness of breath.    Endocrine: Negative.    Skin: Negative for rash.   Musculoskeletal: Negative for back pain and joint pain.   Gastrointestinal: Negative for abdominal pain, diarrhea, nausea and vomiting.   Genitourinary: Negative.    Neurological: Positive for dizziness, headaches and light-headedness.   Psychiatric/Behavioral: The patient is not nervous/anxious.    All other systems reviewed and are negative.      Objective:      Physical Exam   Constitutional: She is oriented to person, place, and time. She appears well-developed and well-nourished. She is cooperative.  Non-toxic appearance. She does not appear ill. No distress.   HENT:   Head: Normocephalic and atraumatic.   Right Ear: Hearing, tympanic membrane, external ear and ear canal normal.   Left Ear: Hearing, tympanic membrane, external ear and ear canal normal.   Nose: Nose normal. No mucosal edema, rhinorrhea or nasal  deformity. No epistaxis. Right sinus exhibits no maxillary sinus tenderness and no frontal sinus tenderness. Left sinus exhibits no maxillary sinus tenderness and no frontal sinus tenderness.   Mouth/Throat: Uvula is midline, oropharynx is clear and moist and mucous membranes are normal. No trismus in the jaw. Normal dentition. No uvula swelling. No posterior oropharyngeal erythema.   Eyes: Conjunctivae and lids are normal. Right eye exhibits no discharge. Left eye exhibits no discharge. No scleral icterus.   Sclera clear bilat   Neck: Trachea normal, normal range of motion, full passive range of motion without pain and phonation normal. Neck supple.   Cardiovascular: Normal rate, regular rhythm, normal heart sounds, intact distal pulses and normal pulses.    Pulmonary/Chest: Effort normal and breath sounds normal. No respiratory distress.   Abdominal: Soft. Normal appearance and bowel sounds are normal. She exhibits no distension, no pulsatile midline mass and no mass. There is no tenderness.   Musculoskeletal: Normal range of motion. She exhibits no edema or deformity.   Neurological: She is alert and oriented to person, place, and time. She displays no atrophy and no tremor. No cranial nerve deficit or sensory deficit. She exhibits normal muscle tone. She displays a negative Romberg sign. Coordination and gait normal. GCS eye subscore is 4. GCS verbal subscore is 5. GCS motor subscore is 6.   Skin: Skin is warm, dry and intact. She is not diaphoretic. No pallor.   Psychiatric: She has a normal mood and affect. Her speech is normal and behavior is normal. Judgment and thought content normal. Cognition and memory are normal.   Nursing note and vitals reviewed.      Assessment:       1. Dizziness        Plan:       Val was seen today for dizziness.    Diagnoses and all orders for this visit:    Dizziness  -     EKG 12-lead  -     POCT Chemistry Panel    Other orders  -     meclizine (ANTIVERT) 25 mg tablet; Take  1 tablet (25 mg total) by mouth every 6 (six) hours.        EKG: normal sinus rhythm every 69 bpm no acute ischemic changes normal axis normal intervals time 1430    Medical decision-making    57-year-old female who presents for evaluation of several weeks to months of dizziness. Patient reports that the symptoms feel as if she is lightheaded and sometimes off-balance. She states that they sometimes last all day long. She denies any pain associated with the symptoms. She states that sometimes her worse if she bends over. She can identify no alleviating factors. She reports she feels dizzy and jittery when the symptoms start. Patient denies pain complaints such as headache, chest pain, abdominal pain, nausea, vomiting, diarrhea, or bleeding. Patient denies any symptoms of a stroke such as loss of vision, slurred speech, numbness or focal weakness. Patient is had no episodes of syncope. Physical exam today has demonstrated normal vital signs including orthostatic vital signs. Her neurologic exam and cardiovascular to exam is normal.    Differential diagnosis: peripheral vertigo, vasovagal response, hypoglycemia or other electrolyte abnormality, cardiac arrhythmia, stroke less likely, CNS tumor less likely    Testing performed and clinic included an EKG and basic chemistry with hemoglobin and hematocrit    Based on EKG, test results, and patient's current examination, I do not feel that she requires further emergent testing today.  Patient also with some nasal drainage and frontal headaches.  Advised to discontinue decongestants.  Patient advised to use zyrtec, flonase, and saline nasal spray.  Patient written for Meclizine.  Will Follow up with PCP in 2-3 weeks for reevaluation.  To ER should symptoms worsen.  Patient understands and agrees with plan.          Patient Instructions     Go to the Emergency Room if symptoms or condition worsens in any way    Zyrtec, Claritin, or Allegra OTC as directed for the next 7  days    Flonase OTC as directed for the next 7 days    Salt Water Nasal Spray OTC 3x/day for the next 7 days      Follow up with Primary Care Provider in 5-7 days    Dizziness (Uncertain Cause)  Dizziness is a common symptom. It may be described as lightheadedness, spinning, or feeling like you are going to faint. Dizziness can have many causes.  Be sure to tell the healthcare provider about:  · All medicines you take, including prescription, over-the-counter, herbs, and supplements  · Any other symptoms you have  · Any health problems you are being treated for  · Anything that causes the dizziness to get worse or better  Today's exam did not show an exact cause for your dizziness. Other tests may be needed. Follow up with your healthcare provider.  Home care  · Dizziness that occurs with sudden standing may be a sign of mild dehydration. Drink extra fluids for the next few days.  · If you recently started a new medicine, stopped a medicine, or had the dose of a current medicine changed, talk with the prescribing healthcare provider. Your medicine plan may need adjustment.  · If dizziness lasts more than a few seconds, sit or lie down until it passes. This may help prevent injury in case you pass out.  · Do not drive or use power tools or dangerous equipment until you have had no dizziness for at least 48 hours.  Follow-up care  Follow up with your healthcare provider for further evaluation within the next 7 days or as advised.  When to seek medical advice  Call your healthcare provider for any of the following:  · Worsening of symptoms or new symptoms  · Passing out or seizure  · Repeated vomiting  · Headache  · Palpitations (the sense that your heart is fluttering or beating fast or hard)  · Shortness of breath  · Blood in vomit or stool (black or red color)  · Weakness of an arm or leg or one side of the face  · Vision or hearing changes  · Trouble walking or speaking  · Chest, arm, neck, back, or jaw pain  Date  Last Reviewed: 8/23/2015  © 3514-5903 [employer]. [address] [place] [address], [place] [address]. All rights reserved. This information is not intended as a substitute for professional medical care. Always follow your healthcare professional's instructions.

## 2018-06-27 DIAGNOSIS — Z12.39 SCREENING BREAST EXAMINATION: Primary | ICD-10-CM

## 2018-06-29 ENCOUNTER — HOSPITAL ENCOUNTER (OUTPATIENT)
Dept: RADIOLOGY | Facility: HOSPITAL | Age: 58
Discharge: HOME | End: 2018-06-29
Attending: SPECIALIST
Payer: COMMERCIAL

## 2018-06-29 DIAGNOSIS — Z12.39 SCREENING BREAST EXAMINATION: ICD-10-CM

## 2018-06-29 PROCEDURE — 77067 SCR MAMMO BI INCL CAD: CPT | Mod: TC

## 2018-06-29 PROCEDURE — 77067 SCR MAMMO BI INCL CAD: CPT | Mod: 26,,, | Performed by: RADIOLOGY

## 2018-08-27 ENCOUNTER — TELEPHONE (OUTPATIENT)
Dept: GASTROENTEROLOGY | Facility: CLINIC | Age: 58
End: 2018-08-27

## 2018-08-27 NOTE — TELEPHONE ENCOUNTER
----- Message from Yolanda Dillon sent at 8/23/2018 12:52 PM CDT -----  Contact: self - [phone]  Rice - received reminder letter to schedule appt in October - nothing at all available for me to book - please call patient at

## 2018-10-11 ENCOUNTER — TELEPHONE (OUTPATIENT)
Dept: GASTROENTEROLOGY | Facility: CLINIC | Age: 58
End: 2018-10-11

## 2018-10-11 NOTE — TELEPHONE ENCOUNTER
----- Message from Yolanda Dillon sent at 10/11/2018  8:27 AM CDT -----  Contact: self - [phone]  Issac - is asking to reschedule her appt - please call patient at  [phone]

## 2018-10-29 ENCOUNTER — OFFICE VISIT (OUTPATIENT)
Dept: GASTROENTEROLOGY | Facility: CLINIC | Age: 58
End: 2018-10-29
Payer: COMMERCIAL

## 2018-10-29 VITALS
HEART RATE: 77 BPM | SYSTOLIC BLOOD PRESSURE: 110 MMHG | DIASTOLIC BLOOD PRESSURE: 62 MMHG | BODY MASS INDEX: 25.53 KG/M2 | WEIGHT: 130.06 LBS | HEIGHT: 60 IN

## 2018-10-29 DIAGNOSIS — K58.2 IRRITABLE BOWEL SYNDROME WITH BOTH CONSTIPATION AND DIARRHEA: Primary | ICD-10-CM

## 2018-10-29 DIAGNOSIS — E73.9 LACTOSE INTOLERANCE: ICD-10-CM

## 2018-10-29 DIAGNOSIS — K21.9 GASTROESOPHAGEAL REFLUX DISEASE WITHOUT ESOPHAGITIS: ICD-10-CM

## 2018-10-29 PROCEDURE — 3008F BODY MASS INDEX DOCD: CPT | Mod: CPTII,S$GLB,, | Performed by: INTERNAL MEDICINE

## 2018-10-29 PROCEDURE — 99213 OFFICE O/P EST LOW 20 MIN: CPT | Mod: S$GLB,,, | Performed by: INTERNAL MEDICINE

## 2018-10-29 PROCEDURE — 99999 PR PBB SHADOW E&M-EST. PATIENT-LVL III: CPT | Mod: PBBFAC,,, | Performed by: INTERNAL MEDICINE

## 2018-10-29 NOTE — PATIENT INSTRUCTIONS
"Try the probiotic called "DeshawnKnowFu" which is available over-the-counter at most major drug stores.    You can take Pepcid, which is an over-the-counter acid reducer, to help when reflux is bothering you.  You can use it for 1 to 2 weeks at a time.  Call our clinic if this is not working.      "

## 2018-10-29 NOTE — PROGRESS NOTES
[facility] Established Patient Visit    Reason for Visit:    Chief Complaint   Patient presents with    Diarrhea     very soft       PCP: Mayco Schroeder Jr    HPI:  Val Horvath is a 58 y.o. female here for follow-up of IBS and GERD.  Last seen by me in clinic almost a year ago for the same.  Rifaximin at that time did not help. Her stools are soft, but occasionally hard.  She is normal between this episodes.  She usually has an abnormal stool only one day out of the week.  She has increased gas.  Not using IBgard.  Bentyl helps some.  Reflux at times, but is intermittent.  She has flares of reflux, last occurred a week ago.  She is not using omeprazole due to it causing diarrhea.  She avoids foods that cause symptoms.  Dairy is a major culprit.            ROS:  Constitutional: No fevers, chills, No weight loss, normal appetite  GI: see HPI        PMHX:  has a past medical history of AR (allergic rhinitis) (3/10/2014), Diverticulosis (2010), Family history of breast cancer in sister, Genital herpes, Hiatal hernia (2010), Meningioma (3/10/2014), and Post herpetic neuralgia (1/21/2014).    PSHX:  has a past surgical history that includes Oophorectomy (Right, 1970s) and Carpal tunnel release (Right, 1990s).    The patient's social and family histories were reviewed by me and updated in the appropriate section of the electronic medical record.    Review of patient's allergies indicates:   Allergen Reactions    Codeine Itching    Sulfa (sulfonamide antibiotics) Itching       Prior to Admission medications    Medication Sig Start Date End Date Taking? Authorizing Provider   ALPRAZolam (XANAX) 0.25 MG tablet Take 1 tablet (0.25 mg total) by mouth 3 (three) times daily as needed for Anxiety. 5/7/18  Yes Mayco Schroeder Jr., MD   dicyclomine (BENTYL) 10 MG capsule Take 10 mg by mouth 3 (three) times daily. 12/18/17  Yes Historical Provider, MD   gabapentin (NEURONTIN) 300 MG capsule take UP TO  6 CAPSULES A DAY 12/15/17  Yes Mike Portillo MD   omeprazole (PRILOSEC) 20 MG capsule take 1 capsule by mouth once daily BEFORE BREAKFAST  Patient taking differently: take 1 capsule by mouth once daily BEFORE BREAKFAST, PRN 4/21/17  Yes Pieter Davenport MD   meclizine (ANTIVERT) 25 mg tablet Take 1 tablet (25 mg total) by mouth every 6 (six) hours. 5/12/18 5/19/18  Luc Sagastume III, MD   PEPPERMINT OIL (IBGARD ORAL) Take by mouth.    Historical Provider, MD   valACYclovir (VALTREX) 500 MG tablet Take 500 mg by mouth once daily. 12/18/17   Historical Provider, MD         Objective Findings:  Vital Signs:  /62   Pulse 77   Ht 5' (1.524 m)   Wt 59 kg (130 lb 1.1 oz)   BMI 25.40 kg/m²  Body mass index is 25.4 kg/m².    Physical Exam:  General Appearance:  Well appearing in no acute distress, appears stated age        Labs:  Lab Results   Component Value Date    WBC 6.5 01/27/2018    HGB 13.1 01/27/2018    HCT 39.9 01/27/2018    MCV 86.0 01/27/2018    RDW 13.0 01/27/2018     01/27/2018    LYMPH 1,372 01/27/2018    LYMPH 21.1 01/27/2018    MONO 306 01/27/2018    MONO 4.7 01/27/2018    EOS 39 01/27/2018    BASO 39 01/27/2018     Lab Results   Component Value Date     01/27/2018    K 4.3 01/27/2018     01/27/2018    CO2 29 01/27/2018    GLU 84 01/27/2018    BUN 16 01/27/2018    CREATININE 0.72 01/27/2018    CALCIUM 9.7 01/27/2018    PROT 7.3 01/27/2018    ALBUMIN 4.4 01/27/2018    BILITOT 0.4 01/27/2018    ALKPHOS 76 01/27/2018    AST 17 01/27/2018    ALT 13 01/27/2018                 Assessment:  Val Horvath is a 58 y.o. female here with:  1. Irritable bowel syndrome with both constipation and diarrhea    2. Gastroesophageal reflux disease without esophagitis    3. Lactose intolerance          Recommendations:  1. Pepcid with flares of reflux  2. Continue to avoid lactose containing products  3. Consider probiotic such as Deshawn's Colon Health    Follow-up in 1  year        Pieter Davenport MD

## 2018-11-30 NOTE — TELEPHONE ENCOUNTER
----- Message from Roberth uBtt sent at 11/30/2018 12:03 PM CST -----  Contact: Pt. [phone]  Rx Refill/Request     Refill Rx:      Rx Name and Strength:  Gabapentin 300 MG    Preferred Pharmacy with phone number:     [facility] [id number] - [address] [address]  Phone: [phone] Fax: [phone]      Communication Preference:PHONE     Additional Information:

## 2018-12-02 RX ORDER — GABAPENTIN 300 MG/1
CAPSULE ORAL
Qty: 180 CAPSULE | Refills: 3 | Status: SHIPPED | OUTPATIENT
Start: 2018-12-02 | End: 2019-12-02 | Stop reason: SDUPTHER

## 2018-12-18 ENCOUNTER — NURSE TRIAGE (OUTPATIENT)
Dept: ADMINISTRATIVE | Facility: CLINIC | Age: 58
End: 2018-12-18

## 2018-12-18 ENCOUNTER — TELEPHONE (OUTPATIENT)
Dept: URGENT CARE | Facility: CLINIC | Age: 58
End: 2018-12-18

## 2018-12-18 ENCOUNTER — HOSPITAL ENCOUNTER (EMERGENCY)
Facility: OTHER | Age: 58
Discharge: HOME | End: 2018-12-18
Attending: EMERGENCY MEDICINE
Payer: COMMERCIAL

## 2018-12-18 ENCOUNTER — OFFICE VISIT (OUTPATIENT)
Dept: URGENT CARE | Facility: CLINIC | Age: 58
End: 2018-12-18
Payer: COMMERCIAL

## 2018-12-18 VITALS
WEIGHT: 132 LBS | BODY MASS INDEX: 25.91 KG/M2 | HEIGHT: 60 IN | OXYGEN SATURATION: 100 % | TEMPERATURE: 98 F | HEART RATE: 95 BPM | DIASTOLIC BLOOD PRESSURE: 71 MMHG | RESPIRATION RATE: 18 BRPM | SYSTOLIC BLOOD PRESSURE: 133 MMHG

## 2018-12-18 VITALS
TEMPERATURE: 97 F | HEART RATE: 95 BPM | WEIGHT: 130 LBS | BODY MASS INDEX: 25.52 KG/M2 | RESPIRATION RATE: 16 BRPM | OXYGEN SATURATION: 99 % | SYSTOLIC BLOOD PRESSURE: 125 MMHG | HEIGHT: 60 IN | DIASTOLIC BLOOD PRESSURE: 81 MMHG

## 2018-12-18 DIAGNOSIS — T78.40XA ALLERGIC REACTION, INITIAL ENCOUNTER: Primary | ICD-10-CM

## 2018-12-18 DIAGNOSIS — L50.9 HIVES: ICD-10-CM

## 2018-12-18 DIAGNOSIS — I10 ESSENTIAL HYPERTENSION: ICD-10-CM

## 2018-12-18 PROBLEM — M79.2 NEURALGIA: Status: ACTIVE | Noted: 2018-12-18

## 2018-12-18 PROCEDURE — 99284 EMERGENCY DEPT VISIT MOD MDM: CPT | Mod: 25

## 2018-12-18 PROCEDURE — 25000003 PHARM REV CODE 250: Performed by: EMERGENCY MEDICINE

## 2018-12-18 PROCEDURE — 96372 THER/PROPH/DIAG INJ SC/IM: CPT | Mod: S$GLB,,, | Performed by: FAMILY MEDICINE

## 2018-12-18 PROCEDURE — 3008F BODY MASS INDEX DOCD: CPT | Mod: CPTII,S$GLB,, | Performed by: FAMILY MEDICINE

## 2018-12-18 PROCEDURE — 96375 TX/PRO/DX INJ NEW DRUG ADDON: CPT

## 2018-12-18 PROCEDURE — 99213 OFFICE O/P EST LOW 20 MIN: CPT | Mod: 25,S$GLB,, | Performed by: FAMILY MEDICINE

## 2018-12-18 PROCEDURE — 63600175 PHARM REV CODE 636 W HCPCS: Performed by: EMERGENCY MEDICINE

## 2018-12-18 PROCEDURE — 96374 THER/PROPH/DIAG INJ IV PUSH: CPT

## 2018-12-18 PROCEDURE — 96361 HYDRATE IV INFUSION ADD-ON: CPT

## 2018-12-18 RX ORDER — DIPHENHYDRAMINE HYDROCHLORIDE 50 MG/ML
25 INJECTION INTRAMUSCULAR; INTRAVENOUS
Status: COMPLETED | OUTPATIENT
Start: 2018-12-18 | End: 2018-12-18

## 2018-12-18 RX ORDER — EPINEPHRINE 0.3 MG/.3ML
1 INJECTION SUBCUTANEOUS ONCE
Qty: 0.3 ML | Refills: 0 | Status: SHIPPED | OUTPATIENT
Start: 2018-12-18 | End: 2019-08-15

## 2018-12-18 RX ORDER — METHYLPREDNISOLONE SOD SUCC 125 MG
VIAL (EA) INJECTION
Status: DISCONTINUED
Start: 2018-12-18 | End: 2018-12-18 | Stop reason: HOSPADM

## 2018-12-18 RX ORDER — FAMOTIDINE 20 MG/1
TABLET, FILM COATED ORAL
Refills: 1 | COMMUNITY
Start: 2018-10-16 | End: 2019-04-29

## 2018-12-18 RX ORDER — DICYCLOMINE HYDROCHLORIDE 10 MG/1
1 CAPSULE ORAL
COMMUNITY
End: 2019-04-26 | Stop reason: SDUPTHER

## 2018-12-18 RX ORDER — FLUTICASONE PROPIONATE 50 MCG
SPRAY, SUSPENSION (ML) NASAL
Refills: 2 | COMMUNITY
Start: 2018-09-12 | End: 2019-08-15

## 2018-12-18 RX ORDER — PROCHLORPERAZINE MALEATE 10 MG
10 TABLET ORAL
COMMUNITY
Start: 2013-10-25 | End: 2019-04-29

## 2018-12-18 RX ORDER — ALPRAZOLAM 0.25 MG/1
1 TABLET ORAL 2 TIMES DAILY
COMMUNITY
Start: 2018-11-05 | End: 2019-04-29 | Stop reason: SDUPTHER

## 2018-12-18 RX ORDER — METHYLPREDNISOLONE SOD SUCC 125 MG
125 VIAL (EA) INJECTION EVERY 6 HOURS
Status: DISCONTINUED | OUTPATIENT
Start: 2018-12-18 | End: 2018-12-18 | Stop reason: HOSPADM

## 2018-12-18 RX ORDER — BUTALBITAL, ACETAMINOPHEN AND CAFFEINE 50; 325; 40 MG/1; MG/1; MG/1
1 TABLET ORAL
COMMUNITY
Start: 2013-10-25 | End: 2018-12-18

## 2018-12-18 RX ORDER — RIZATRIPTAN BENZOATE 10 MG/1
10 TABLET ORAL
COMMUNITY
End: 2019-04-29

## 2018-12-18 RX ORDER — METHYLPREDNISOLONE SOD SUCC 125 MG
125 VIAL (EA) INJECTION EVERY 6 HOURS
Status: DISCONTINUED | OUTPATIENT
Start: 2018-12-19 | End: 2018-12-18

## 2018-12-18 RX ORDER — ESOMEPRAZOLE MAGNESIUM 40 MG/1
40 CAPSULE, DELAYED RELEASE ORAL
COMMUNITY
End: 2019-04-29

## 2018-12-18 RX ORDER — MECLIZINE HYDROCHLORIDE 25 MG/1
1 TABLET ORAL
COMMUNITY
End: 2019-04-29

## 2018-12-18 RX ORDER — BETAMETHASONE SODIUM PHOSPHATE AND BETAMETHASONE ACETATE 3; 3 MG/ML; MG/ML
6 INJECTION, SUSPENSION INTRA-ARTICULAR; INTRALESIONAL; INTRAMUSCULAR; SOFT TISSUE
Status: COMPLETED | OUTPATIENT
Start: 2018-12-18 | End: 2018-12-18

## 2018-12-18 RX ORDER — HYDROXYZINE HYDROCHLORIDE 25 MG/1
25 TABLET, FILM COATED ORAL NIGHTLY PRN
Qty: 7 TABLET | Refills: 0 | Status: SHIPPED | OUTPATIENT
Start: 2018-12-18 | End: 2019-04-29

## 2018-12-18 RX ORDER — MELOXICAM 15 MG/1
TABLET ORAL
Refills: 1 | COMMUNITY
Start: 2018-10-16 | End: 2019-04-29

## 2018-12-18 RX ORDER — OLOPATADINE HYDROCHLORIDE 2 MG/ML
SOLUTION/ DROPS OPHTHALMIC
Refills: 2 | COMMUNITY
Start: 2018-11-17 | End: 2019-08-15

## 2018-12-18 RX ORDER — CYCLOBENZAPRINE HCL 10 MG
TABLET ORAL
Refills: 1 | COMMUNITY
Start: 2018-10-16 | End: 2023-07-19

## 2018-12-18 RX ORDER — METHYLPREDNISOLONE 4 MG/1
4 TABLET ORAL DAILY
Qty: 1 PACKAGE | Refills: 0 | Status: SHIPPED | OUTPATIENT
Start: 2018-12-18 | End: 2019-01-08

## 2018-12-18 RX ADMIN — BETAMETHASONE SODIUM PHOSPHATE AND BETAMETHASONE ACETATE 6 MG: 3; 3 INJECTION, SUSPENSION INTRA-ARTICULAR; INTRALESIONAL; INTRAMUSCULAR; SOFT TISSUE at 10:12

## 2018-12-18 RX ADMIN — DIPHENHYDRAMINE HYDROCHLORIDE 25 MG: 50 INJECTION INTRAMUSCULAR; INTRAVENOUS at 07:12

## 2018-12-18 RX ADMIN — SODIUM CHLORIDE 1000 ML: 0.9 INJECTION, SOLUTION INTRAVENOUS at 07:12

## 2018-12-18 RX ADMIN — METHYLPREDNISOLONE SODIUM SUCCINATE 125 MG: 125 INJECTION, POWDER, FOR SOLUTION INTRAMUSCULAR; INTRAVENOUS at 07:12

## 2018-12-18 NOTE — TELEPHONE ENCOUNTER
Patient called stating she is now having lip swelling. Spoke with Dr. Rivers and she advised patient to go to the ER.

## 2018-12-18 NOTE — TELEPHONE ENCOUNTER
Patient went to urgent care but then since her lip started swelling up, she was advise by the dr to go to ER

## 2018-12-18 NOTE — PROGRESS NOTES
Subjective:       Patient ID: Val Horvath is a 58 y.o. female.    Vitals:    12/18/18 0934   BP: 125/81   Pulse: 95   Resp: 16   Temp: 96.8 °F (36 °C)   SpO2: 99%   Weight: 59 kg (130 lb)   Height: 5' (1.524 m)       Chief Complaint: Rash    Pt states all over itchy, rash that woke her up at 4:00 am today. Pt states she ate a dish with crawfish and shrimp last evening. Pt has no hx of allergies to shellfish. Pt states she took a xanax this am for itching.  Patient reports that she started with a diffuse urticarial rash last night.  She was exposed to shellfish but is not aware of any other exposure to other allergens.  Patient has never has this reaction to shellfish.  She reports that she is itchy and did get a little short of breath.  SOB had subsided after her Xanax began working.      Rash   This is a new problem. The current episode started today. The problem has been gradually worsening since onset. The rash is diffuse. She was exposed to shellfish. Pertinent negatives include no fever, joint pain, shortness of breath or sore throat. Treatments tried: xanax.     Review of Systems   Constitution: Negative for chills and fever.   HENT: Negative for sore throat.    Respiratory: Negative for shortness of breath.    Skin: Positive for itching and rash.   Musculoskeletal: Negative for joint pain.       Objective:      Physical Exam   Constitutional: She is oriented to person, place, and time. She appears well-developed and well-nourished.   HENT:   Head: Normocephalic and atraumatic. Head is without abrasion, without contusion and without laceration.   Right Ear: External ear normal.   Left Ear: External ear normal.   Nose: Nose normal.   Mouth/Throat: Oropharynx is clear and moist.   Eyes: Conjunctivae, EOM and lids are normal. Pupils are equal, round, and reactive to light.   Neck: Trachea normal, full passive range of motion without pain and phonation normal. Neck supple.   Cardiovascular: Normal rate,  regular rhythm and normal heart sounds.   Pulmonary/Chest: Effort normal and breath sounds normal. No stridor. No respiratory distress.   Musculoskeletal: Normal range of motion.   Neurological: She is alert and oriented to person, place, and time.   Skin: Skin is warm, dry and intact. Capillary refill takes less than 2 seconds. Rash noted. No abrasion, no bruising, no burn, no ecchymosis, no laceration and no lesion noted. Rash is urticarial. Pustular rash: diffuse. No erythema.   Psychiatric: She has a normal mood and affect. Her speech is normal and behavior is normal. Judgment and thought content normal. Cognition and memory are normal.   Nursing note and vitals reviewed.      Assessment:       1. Allergic reaction, initial encounter        Plan:       Val was seen today for rash.    Diagnoses and all orders for this visit:    Allergic reaction, initial encounter    Other orders  -     betamethasone acetate-betamethasone sodium phosphate injection 6 mg  -     methylPREDNISolone (MEDROL DOSEPACK) 4 mg tablet; Take 1 tablet (4 mg total) by mouth once daily. use as directed for 21 days  -     hydrOXYzine HCl (ATARAX) 25 MG tablet; Take 1 tablet (25 mg total) by mouth nightly as needed for Itching.  -     EPINEPHrine (EPIPEN) 0.3 mg/0.3 mL AtIn; Inject 0.3 mLs (0.3 mg total) into the muscle once. for 1 dose

## 2018-12-18 NOTE — TELEPHONE ENCOUNTER
Reason for Disposition   [1] Severe swelling AND [2] cause unknown    Protocols used: ST LIP SWELLING-A-AH    Pt reports that she was seen by MD this morning for an allergic reaction to shellfish. She was having hives and was itching all over. States that her top lip just started swelling this afternoon. Denies tongue involvment or difficulty breathing. Per protocol advised to go to ER

## 2018-12-18 NOTE — PATIENT INSTRUCTIONS
General Allergic Reactions  An allergic reaction is a set of symptoms caused by an allergen. An allergen is something that causes a persons immune system to react. When a person comes in contact with an allergen, it causes the body to release chemicals. These include the chemical histamine. Histamine causes swelling and itching. It may affect the entire body. This is called a general allergic reaction. Often symptoms affect only 1 part of the body. This is called a local allergic reaction.  You are having an allergic reaction. Almost anything can cause one. Different people are allergic to different things. It is usually something that you ate or swallowed, came into contact with by getting or putting it on your skin or clothes, or something you breathed in the air. This can be very annoying and sometimes scary.  Most of us think of allergic reactions when we have a rash or itchy skin. Symptoms can include:  · Itching of the eyes, nose, and roof of the mouth  · Runny or stuffy nose  · Watery eyes   · Sneezing or coughing   · A blocked feeling in the ear  · Red, itchy rash called hives  · Red and purple spots  · Rash, redness, welts, blisters  · Itching, burning, stinging, pain  · Dry, flaky, cracking, scaly skin  Severe symptoms include:  · Swelling of the face, lips, or other parts of the body  · Hoarse voice  · Trouble swallowing, feeling like your throat is closing  · Trouble breathing, wheezing  · Nausea, vomiting, diarrhea, stomach cramps  · Feeling faint or lightheaded, rapid heart rate  Sometimes the cause may be obvious. But there are so many things that can cause a reaction that you may not be able to figure out. The most important things to help find your allergen are:  · Remembering when it started  · What you were doing at the time or just before that  · Any activities you were involved in  · Any new products or contacts  Below are some common causes. But remember that almost anything can cause a  reaction. You may not even be aware that you came into contact with one of these things:  · Dust, mold, pollen  · Plants (common ones are poison ivy and poison oak, but there are many others)   · Animals  · Foods such as shrimp, shellfish, peanuts, milk products, gluten, and eggs. Also food colorings, flavorings, and additives.  · Insect bites or stings such as bees, mosquitos, fleas, ticks  · Medicines such as penicillin, sulfa medicines, amoxicillin, aspirin, and ibuprofen. But any medicine can cause a reaction.  · Jewelry such as nickel or gold. This can be new, or something youve worn for a while, including zippers and buttons.  · Latex such as in gloves, clothes, toys, balloons, or some tapes. Some people allergic to latex may also have problems with foods like bananas, avocados, kiwi, papaya, or chestnuts.  · Lotions, perfumes, cosmetics, soaps, shampoos, skincare products, nail products  · Chemicals or dyes in clothing, linen, , hair dyes, soaps, iodine  Many viruses and common colds can cause a rash that is not an allergic reaction. Sometimes it is hard to tell the difference between allergies, sensitivity, or an intolerance to something. This is especially true with food. Many things can cause diarrhea, vomiting, stomach cramps, and skin irritation.  Home care    The goal of treatment is to help relieve the symptoms and get you feeling better. The rash will usually fade over several days. But it can sometimes last a couple of weeks. Over the next couple of days, there may be times when it is gets a little worse, and then better again. Here are some things to do:  · If you know what you are allergic to, stay away from it. Future reactions could be worse than this one.  · Avoid tight clothing and anything that heats up your skin (hot showers or baths, direct sunlight). Heat will make itching worse.  · An ice pack will relieve local areas of intense itching and redness. To make an ice pack, put ice  cubes in a plastic bag that seals at the top. Wrap it in a thin, clean towel. Dont put the ice directly on the skin because it can damage the skin.  · Oral diphenhydramine is an over-the-counter antihistamine sold at pharmacy and grocery stores. Unless a prescription antihistamine was given, diphenhydramine may be used to reduce itching if large areas of the skin are involved. It may make you sleepy. So be careful using it in the daytime or when going to school, working, or driving. Note: Dont use diphenhydramine if you have glaucoma or if you are a man with trouble urinating due to an enlarged prostate. There are other antihistamines that wont make you so sleepy. These are good choices for daytime use. Ask your pharmacist for suggestions.  · Dont use diphenhydramine cream on your skin. It can cause a further reaction in some people.  · To help prevent an infection, don't scratch the affected area. Scratching may worsen the reaction and damage your skin. It can also lead to an infection. Always check the affected for signs of an infection.  · Call your healthcare provider and ask what you can use to help decrease the itching.  · To decrease allergic reactions, try the following:    · Use heat-steam to clean your home  · Use high-efficiency particulate (HEPA) vacuums and filters  · Stay away from food and pet triggers  · Kill any cockroaches  · Clean your house often  Follow-up care  Follow up with your healthcare provider, or as advised. If you had a severe reaction today, or if you have had several mild to medium allergic reactions in the past, ask your provider about allergy testing. This can help you find out what you are allergic to. If your reaction included dizziness, fainting, or trouble breathing or swallowing, ask your provider about carrying auto-injectable epinephrine.  Call 911  Call 911 if any of these occur:  · Trouble breathing or swallowing, wheezing  · Cool, moist, pale skin  · Shortness of  breath  · Hoarse voice or trouble speaking  · Confused   · Very drowsy or trouble awakening  · Fainting or loss of consciousness  · Rapid heart rate  · Feeling of dizziness or weakness or a sudden drop in blood pressure  · Feeling of doom  · Feeling lightheaded  · Severe nausea or vomiting, or diarrhea  · Seizure  · Swelling in the face, eyelids, lips, mouth, throat or tongue  · Drooling  When to seek medical advice  Call your healthcare provider right away if any of these occur:  · Spreading areas of itching, redness or swelling  · Nausea or stomach cramps or abdominal pain  · Continuing or recurring symptoms  · Spreading areas of redness, swelling, or itching  · Signs of infection at the affected site:  ¨ Spreading redness  ¨ Increased pain or swelling  ¨ Fluid or colored drainage from the site  ¨ Fever of 100.4°F (38°C) or above lasting for 24 to 48 hours, or as directed by your provider  Date Last Reviewed: 3/1/2017  © 8672-7424 [employer]. [address] [place] [address], [place] [address]. All rights reserved. This information is not intended as a substitute for professional medical care. Always follow your healthcare professional's instructions.      Start medrol dosepak tomorrow  Take Zyrtec in the morning for itching  Take hydroxyzine at bedtime   Epipen should be used if re-exposure occurs with shortness of breath, swelling, lightheadedness, closing or swelling of the throat lips or tongue

## 2018-12-19 NOTE — ED PROVIDER NOTES
Encounter Date: 12/18/2018    SCRIBE #1 NOTE: Trung FENG am scribing for, and in the presence of, Dr. Sanchez .       History     Chief Complaint   Patient presents with    Allergic Reaction     Pt reports a diffuse rash to entire body since 0400 this am. She was seen at  and prescribed multiple meds and given a steroid shot at 0930. Pt states she notice PTA that her lips started to swell. Pt denies SOB, denies difficulty swallowing, denies swelling to the throat.      Time seen by provider: 6:51 PM    This is a 58 y.o. female who presents with complaint of allergic reaction that began at 4 am this morning. Patient reports eating crawfish, cheese, and fries for dinner last night. She reports waking up at 4 am with rash and itching to her back, arms, and hands. Patient states she presented to [facility] earlier today, and she was given a cortisone injection and prescription for prednisone, epi-pen, hydroxyzine, and Zyrtec. She was instructed to start the course of prednisone tomorrow and was advised to take the other medications as needed. Patient states she was advised to to present to the ED if symptoms worsened or if she started to experience tongue or lip swelling. She reports mild lip swelling and rash to her back and arms currently. Patient states she has never experienced an allergic reaction to similar foods in the past. She denies use of new detergent, soaps, or medications. She has not been experiencing fevers, chills, tongue swelling, trouble swallowing, SOB, chest pain, abdominal pain, N/V/D. Patient reports known allergy to Sulfa drugs, which will cause itching.         The history is provided by the patient.     Review of patient's allergies indicates:   Allergen Reactions    Codeine Itching    Sulfa (sulfonamide antibiotics) Itching     Past Medical History:   Diagnosis Date    Anxiety     AR (allergic rhinitis) 3/10/2014    Diverticulosis 2010    Family history of breast cancer  in sister     Genital herpes     Hiatal hernia 2010    Meningioma 3/10/2014    MRI 2/14    Post herpetic neuralgia 1/21/2014 1/14      Past Surgical History:   Procedure Laterality Date    CARPAL TUNNEL RELEASE Right 1990s    OOPHORECTOMY Right 1970s     Family History   Problem Relation Age of Onset    Heart disease Mother 65        MI    Hypertension Mother     Bladder Cancer Father         bladder    Breast cancer Sister 45    No Known Problems Brother      Social History     Tobacco Use    Smoking status: Never Smoker    Smokeless tobacco: Never Used   Substance Use Topics    Alcohol use: No    Drug use: Not on file     Review of Systems   Constitutional: Negative for chills and fever.   HENT: Positive for facial swelling (lips). Negative for trouble swallowing.         Negative for tongue swelling.    Respiratory: Negative for shortness of breath.    Cardiovascular: Negative for chest pain.   Gastrointestinal: Negative for abdominal pain, diarrhea, nausea and vomiting.   Skin: Positive for rash (back, arms, hands).       Physical Exam     Initial Vitals [12/18/18 1725]   BP Pulse Resp Temp SpO2   (!) 175/73 (!) 115 18 98.1 °F (36.7 °C) 98 %      MAP       --         Physical Exam    Nursing note and vitals reviewed.  Constitutional: She appears well-developed and well-nourished. No distress.   HENT:   Head: Normocephalic and atraumatic.   No trismus or drooling. No tongue swelling.    Eyes: Conjunctivae and EOM are normal.   Neck: Normal range of motion. Neck supple.   Cardiovascular: Normal rate, regular rhythm, normal heart sounds and intact distal pulses.   Pulmonary/Chest: Breath sounds normal. No stridor. No respiratory distress. She has no wheezes. She has no rhonchi. She has no rales.   Musculoskeletal: Normal range of motion. She exhibits no edema.   Neurological: She is alert and oriented to person, place, and time. She has normal strength.   Skin: Skin is warm and dry.   Generalized  hives present to back and upper extremities.    Psychiatric: She has a normal mood and affect. Her behavior is normal. Judgment and thought content normal.         ED Course   Procedures  Labs Reviewed - No data to display       Imaging Results    None                     Scribe Attestation:   Scribe #1: I performed the above scribed service and the documentation accurately describes the services I performed. I attest to the accuracy of the note.    Attending Attestation:           Physician Attestation for Scribe:  Physician Attestation Statement for Scribe #1: I, Dr. Hunter, reviewed documentation, as scribed by Trung Cuello  in my presence, and it is both accurate and complete.         Attending ED Notes:   Emergent evaluation a 58-year-old female with complaint of hives that began yesterday.  Patient is already been seen and treated at Urgent Care.  Patient states that she felt like her lips were swelling after she left urgent care.  No appreciable lip swelling. No tongue swelling. No stridor, trismus or drooling.  Patient is administered Solu-Medrol IV and Benadryl IV.  The patient is extensively counseled on her diagnosis and treatment, discharged good condition and directed to follow up with her PCP in the next 24-48 hours.    Patient does have EpiPen with her and is instructed on its use if needed.             Clinical Impression:     1. Allergic reaction, initial encounter    2. Hives    3. Essential hypertension                                   Pieter Hunter MD  12/18/18 1943

## 2018-12-19 NOTE — ED TRIAGE NOTES
"Pt arrived to ED with c/o lip swelling. Pt states " I ate some crawfish and cheese dish this morning and after I broke out in hives. I went to urgent care and they gave me steroids to go home with. Pt reports lip swelling that started this afternoon. Pt denies SOB/swallowing difficulties. No tongue swelling noted. Pt sat 98% RA. Pt states that she is feeling better. NAD noted. Mild hives noted to bilateral arms    "

## 2019-04-15 ENCOUNTER — PATIENT OUTREACH (OUTPATIENT)
Dept: ADMINISTRATIVE | Facility: HOSPITAL | Age: 59
End: 2019-04-15

## 2019-04-26 NOTE — TELEPHONE ENCOUNTER
----- Message from Lanette Laura sent at 4/26/2019 10:28 AM CDT -----  Contact: Self- [phone]                                Prescription Refill Request  Name of medication and dose  dicyclomine (BENTYL) 10 MG capsule     Pharmacy [facility] [address] [place] [address] [address] [place] [address] BLVD AT [facility] [address] [place] [phone]      Are you completely out of your medication YES    Additional Information:  Pt also needs f/u appt in October

## 2019-04-29 ENCOUNTER — OFFICE VISIT (OUTPATIENT)
Dept: INTERNAL MEDICINE | Facility: CLINIC | Age: 59
End: 2019-04-29
Payer: COMMERCIAL

## 2019-04-29 VITALS
TEMPERATURE: 98 F | HEART RATE: 80 BPM | BODY MASS INDEX: 27.92 KG/M2 | WEIGHT: 142.19 LBS | SYSTOLIC BLOOD PRESSURE: 100 MMHG | DIASTOLIC BLOOD PRESSURE: 60 MMHG | HEIGHT: 60 IN | RESPIRATION RATE: 16 BRPM

## 2019-04-29 DIAGNOSIS — Z12.39 BREAST CANCER SCREENING: ICD-10-CM

## 2019-04-29 DIAGNOSIS — F41.9 ANXIETY: ICD-10-CM

## 2019-04-29 DIAGNOSIS — Z00.00 ANNUAL PHYSICAL EXAM: Primary | ICD-10-CM

## 2019-04-29 DIAGNOSIS — E16.2 HYPOGLYCEMIA: ICD-10-CM

## 2019-04-29 PROCEDURE — 99396 PR PREVENTIVE VISIT,EST,40-64: ICD-10-PCS | Mod: S$GLB,,, | Performed by: HOSPITALIST

## 2019-04-29 PROCEDURE — 99999 PR PBB SHADOW E&M-EST. PATIENT-LVL III: ICD-10-PCS | Mod: PBBFAC,,, | Performed by: HOSPITALIST

## 2019-04-29 PROCEDURE — 3078F DIAST BP <80 MM HG: CPT | Mod: CPTII,S$GLB,, | Performed by: HOSPITALIST

## 2019-04-29 PROCEDURE — 3074F SYST BP LT 130 MM HG: CPT | Mod: CPTII,S$GLB,, | Performed by: HOSPITALIST

## 2019-04-29 PROCEDURE — 3078F PR MOST RECENT DIASTOLIC BLOOD PRESSURE < 80 MM HG: ICD-10-PCS | Mod: CPTII,S$GLB,, | Performed by: HOSPITALIST

## 2019-04-29 PROCEDURE — 99396 PREV VISIT EST AGE 40-64: CPT | Mod: S$GLB,,, | Performed by: HOSPITALIST

## 2019-04-29 PROCEDURE — 99999 PR PBB SHADOW E&M-EST. PATIENT-LVL III: CPT | Mod: PBBFAC,,, | Performed by: HOSPITALIST

## 2019-04-29 PROCEDURE — 3074F PR MOST RECENT SYSTOLIC BLOOD PRESSURE < 130 MM HG: ICD-10-PCS | Mod: CPTII,S$GLB,, | Performed by: HOSPITALIST

## 2019-04-29 RX ORDER — LORATADINE 10 MG/1
10 TABLET ORAL DAILY PRN
Qty: 90 TABLET | Refills: 3 | Status: SHIPPED | OUTPATIENT
Start: 2019-04-29 | End: 2020-05-25 | Stop reason: SDUPTHER

## 2019-04-29 RX ORDER — LORATADINE 10 MG/1
10 TABLET ORAL DAILY PRN
COMMUNITY
End: 2019-04-29 | Stop reason: SDUPTHER

## 2019-04-29 RX ORDER — VALACYCLOVIR HYDROCHLORIDE 500 MG/1
500 TABLET, FILM COATED ORAL DAILY PRN
Qty: 30 TABLET | Refills: 0 | Status: SHIPPED | OUTPATIENT
Start: 2019-04-29 | End: 2019-10-31 | Stop reason: SDUPTHER

## 2019-04-29 RX ORDER — ALPRAZOLAM 0.25 MG/1
0.25 TABLET ORAL 2 TIMES DAILY PRN
Qty: 60 TABLET | Refills: 0 | Status: SHIPPED | OUTPATIENT
Start: 2019-04-29 | End: 2019-06-06 | Stop reason: SDUPTHER

## 2019-04-29 RX ORDER — DICYCLOMINE HYDROCHLORIDE 10 MG/1
10 CAPSULE ORAL 3 TIMES DAILY PRN
Qty: 90 CAPSULE | Refills: 5 | Status: SHIPPED | OUTPATIENT
Start: 2019-04-29 | End: 2019-10-26

## 2019-04-29 NOTE — PROGRESS NOTES
Subjective:     @Patient ID: Valbob Horvath is a 58 y.o. female.    Chief Complaint: Annual Exam    HPI    58 y.o. female here for annual exam. Pt is new to me. She is switching PCPs. 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 Reports she has felt like she has had low BG when feeling lightheaded. Reports blood sugar was checked few days ago but it was in the 80s. She reports she used to see Dr Levy her PCP. However unable to now due to insurance. She reports she follows with GI for IBS and neurology for her meningioma.     Lipid disorders/ASCVD risk (ages >/= 45 or >/= 20 if increased risk ): ordered  DM (>45y yearly or if obese, HTN): A1c ordered  Hepatitis C (one time if born between 6701-1548): ordered    Eye exam: done 2018    Breast Cancer (40-50y discretion of pt, 50-74y every 1-2 years): Mammogram Due.   Cervical Cancer (Pap Smear ages 21-65 every 3 years or Pap + HPV q5 years after 30 years of age): done 6/2018. Dr. Darrell Valdez.   Colorectal Cancer (normal risk 50-75yr): Colonoscopy Last done 8378-8972. Follows with GI        Vaccines:   Influenza (yearly): declines  Tetanus (every 10 yrs - 1st tdap) done 1/2018  PPSV23(>64yo or <65 w/ lung dz, smoking, DM): n/a   Zoster (>59yo) n/a      Exercise: biking, walking   Diet: regular         Review of Systems   Constitutional: Negative for chills and fever.   HENT: Negative for congestion and sore throat.    Eyes: Negative for pain and visual disturbance.   Respiratory: Negative for cough and shortness of breath.    Cardiovascular: Negative for chest pain and leg swelling.   Gastrointestinal: Negative for abdominal pain, nausea and vomiting.   Endocrine: Negative for polydipsia and polyuria.   Genitourinary: Negative for difficulty urinating and dysuria.   Musculoskeletal: Negative for arthralgias and back pain.   Skin: Negative for rash and wound.   Neurological: Negative for dizziness, weakness and headaches.   Psychiatric/Behavioral: Negative for agitation and  confusion.     Past medical history, surgical history, and family medical history reviewed and updated as appropriate.    Medications and allergies reviewed.     Objective:     Vitals:    04/29/19 1548   BP: 100/60   Resp: 16   Temp: 98.2 °F (36.8 °C)   TempSrc: Oral   Weight: 64.5 kg (142 lb 3.2 oz)   Height: 5' (1.524 m)     Body mass index is 27.77 kg/m².  Physical Exam   Constitutional: She is oriented to person, place, and time. She appears well-developed and well-nourished. No distress.   HENT:   Head: Normocephalic and atraumatic.   Mouth/Throat: Oropharynx is clear and moist. No oropharyngeal exudate.   Eyes: Conjunctivae are normal. Right eye exhibits no discharge. Left eye exhibits no discharge.   Neck: Normal range of motion. Neck supple.   Cardiovascular: Normal rate, regular rhythm and intact distal pulses. Exam reveals no friction rub.   No murmur heard.  Pulmonary/Chest: Effort normal and breath sounds normal.   Abdominal: Soft. Bowel sounds are normal. She exhibits no distension. There is no tenderness. There is no guarding.   Musculoskeletal: Normal range of motion. She exhibits no edema.   Lymphadenopathy:     She has no cervical adenopathy.   Neurological: She is alert and oriented to person, place, and time.   Skin: Skin is warm and dry.   Psychiatric: She has a normal mood and affect. Her behavior is normal.   Vitals reviewed.      Lab Results   Component Value Date    WBC 6.5 01/27/2018    HGB 13.1 01/27/2018    HCT 39.9 01/27/2018     01/27/2018    CHOL 191 01/27/2018    TRIG 89 01/27/2018    HDL 86 01/27/2018    ALT 13 01/27/2018    AST 17 01/27/2018     01/27/2018    K 4.3 01/27/2018     01/27/2018    CREATININE 0.72 01/27/2018    BUN 16 01/27/2018    CO2 29 01/27/2018    TSH 1.20 01/27/2018       Assessment:     1. Annual physical exam    2. Breast cancer screening    3. Anxiety    4. Hypoglycemia      Plan:   Val was seen today for annual exam.    Diagnoses and all  orders for this visit:    Annual physical exam  - Recommend exercise and healthy diet   -     Comprehensive metabolic panel; Future  -     CBC auto differential; Future  -     TSH; Future  -     Vitamin D; Future  -     Lipid panel; Future  -     HIV 1/2 Ag/Ab (4th Gen); Future  -     Urinalysis; Future  -     Mammo Digital Screening Bilat w/ Jimmy; Future  -     HEMOGLOBIN A1C; Future  -     Comprehensive metabolic panel  -     CBC auto differential  -     TSH  -     Vitamin D  -     Lipid panel  -     HIV 1/2 Ag/Ab (4th Gen)  -     Urinalysis  -     HEMOGLOBIN A1C    Breast cancer screening  -     Mammo Digital Screening Bilat w/ Jimmy; Future    Anxiety        - Refill xanax. Reviewed  and pt appears compliant with medication and receiving from former PCP.     Other orders  -     ALPRAZolam (XANAX) 0.25 MG tablet; Take 1 tablet (0.25 mg total) by mouth 2 (two) times daily as needed for Anxiety.  -     loratadine (CLARITIN) 10 mg tablet; Take 1 tablet (10 mg total) by mouth daily as needed for Allergies.  -     valACYclovir (VALTREX) 500 MG tablet; Take 1 tablet (500 mg total) by mouth daily as needed.    Hypoglycemia  - will check cpeptide, insuline, sulfonyurea screen, proinsulin      Follow up in about 1 year (around 4/29/2020), or if symptoms worsen or fail to improve.    India Camacho MD  Internal Medicine    4/29/2019

## 2019-05-10 ENCOUNTER — TELEPHONE (OUTPATIENT)
Dept: INTERNAL MEDICINE | Facility: CLINIC | Age: 59
End: 2019-05-10

## 2019-05-11 LAB
25(OH)D3 SERPL-MCNC: 36 NG/ML (ref 30–100)
ALBUMIN SERPL-MCNC: 4.2 G/DL (ref 3.6–5.1)
ALBUMIN/GLOB SERPL: 1.5 (CALC) (ref 1–2.5)
ALP SERPL-CCNC: 77 U/L (ref 33–130)
ALT SERPL-CCNC: 18 U/L (ref 6–29)
APPEARANCE UR: CLEAR
AST SERPL-CCNC: 24 U/L (ref 10–35)
B-OH-BUTYR SERPL-SCNC: 0.05 MMOL/L
BASOPHILS # BLD AUTO: 52 CELLS/UL (ref 0–200)
BASOPHILS NFR BLD AUTO: 1.2 %
BILIRUB SERPL-MCNC: 0.4 MG/DL (ref 0.2–1.2)
BILIRUB UR QL STRIP: NEGATIVE
BUN SERPL-MCNC: 19 MG/DL (ref 7–25)
BUN/CREAT SERPL: NORMAL (CALC) (ref 6–22)
C PEPTIDE SERPL-MCNC: 0.71 NG/ML (ref 0.8–3.85)
CALCIUM SERPL-MCNC: 9.3 MG/DL (ref 8.6–10.4)
CHLORIDE SERPL-SCNC: 107 MMOL/L (ref 98–110)
CHOLEST SERPL-MCNC: 223 MG/DL
CHOLEST/HDLC SERPL: 3 (CALC)
CO2 SERPL-SCNC: 22 MMOL/L (ref 20–32)
COLOR UR: YELLOW
CREAT SERPL-MCNC: 0.67 MG/DL (ref 0.5–1.05)
EOSINOPHIL # BLD AUTO: 69 CELLS/UL (ref 15–500)
EOSINOPHIL NFR BLD AUTO: 1.6 %
ERYTHROCYTE [DISTWIDTH] IN BLOOD BY AUTOMATED COUNT: 13 % (ref 11–15)
GFRSERPLBLD MDRD-ARVRAT: 97 ML/MIN/1.73M2
GLOBULIN SER CALC-MCNC: 2.8 G/DL (CALC) (ref 1.9–3.7)
GLUCOSE SERPL-MCNC: 76 MG/DL (ref 65–99)
GLUCOSE UR QL STRIP: NEGATIVE
HBA1C MFR BLD: 5.4 % OF TOTAL HGB
HCT VFR BLD AUTO: 39.3 % (ref 35–45)
HDLC SERPL-MCNC: 74 MG/DL
HGB BLD-MCNC: 13.2 G/DL (ref 11.7–15.5)
HGB UR QL STRIP: NEGATIVE
HIV 1+2 AB+HIV1 P24 AG SERPL QL IA: NORMAL
INSULIN SERPL-ACNC: 3.9 UIU/ML (ref 2–19.6)
KETONES UR QL STRIP: NEGATIVE
LDLC SERPL CALC-MCNC: 129 MG/DL (CALC)
LEUKOCYTE ESTERASE UR QL STRIP: NEGATIVE
LYMPHOCYTES # BLD AUTO: 1238 CELLS/UL (ref 850–3900)
LYMPHOCYTES NFR BLD AUTO: 28.8 %
MCH RBC QN AUTO: 29.1 PG (ref 27–33)
MCHC RBC AUTO-ENTMCNC: 33.6 G/DL (ref 32–36)
MCV RBC AUTO: 86.6 FL (ref 80–100)
MONOCYTES # BLD AUTO: 331 CELLS/UL (ref 200–950)
MONOCYTES NFR BLD AUTO: 7.7 %
NEUTROPHILS # BLD AUTO: 2610 CELLS/UL (ref 1500–7800)
NEUTROPHILS NFR BLD AUTO: 60.7 %
NITRITE UR QL STRIP: NEGATIVE
NONHDLC SERPL-MCNC: 149 MG/DL (CALC)
PH UR STRIP: 6 [PH] (ref 5–8)
PLATELET # BLD AUTO: 182 THOUSAND/UL (ref 140–400)
PMV BLD REES-ECKER: 11.3 FL (ref 7.5–12.5)
POTASSIUM SERPL-SCNC: 4.4 MMOL/L (ref 3.5–5.3)
PROINSULIN SERPL-SCNC: 10.8 PMOL/L
PROT SERPL-MCNC: 7 G/DL (ref 6.1–8.1)
PROT UR QL STRIP: NEGATIVE
RBC # BLD AUTO: 4.54 MILLION/UL (ref 3.8–5.1)
SODIUM SERPL-SCNC: 142 MMOL/L (ref 135–146)
SP GR UR STRIP: 1.02 (ref 1–1.03)
TRIGL SERPL-MCNC: 102 MG/DL
TSH SERPL-ACNC: 2.11 MIU/L (ref 0.4–4.5)
WBC # BLD AUTO: 4.3 THOUSAND/UL (ref 3.8–10.8)

## 2019-06-06 RX ORDER — ALPRAZOLAM 0.25 MG/1
0.25 TABLET ORAL 2 TIMES DAILY PRN
Qty: 60 TABLET | Refills: 0 | Status: SHIPPED | OUTPATIENT
Start: 2019-06-06 | End: 2019-07-11 | Stop reason: SDUPTHER

## 2019-06-06 NOTE — TELEPHONE ENCOUNTER
----- Message from Keisha Mcclain sent at 6/6/2019  2:58 PM CDT -----  Contact: self/[phone]  Type: Rx    Name of medication(s): ALPRAZolam (XANAX) 0.25 MG tablet    Is this a refill? New rx? Refill     Who prescribed medication? Dr. Camacho     Pharmacy Name, Phone, & Location:[email or web address] [address] [place] [address] AT [facility]    Comments: Please advise.        Thank You

## 2019-07-11 RX ORDER — ALPRAZOLAM 0.25 MG/1
0.25 TABLET ORAL 2 TIMES DAILY PRN
Qty: 60 TABLET | Refills: 0 | Status: SHIPPED | OUTPATIENT
Start: 2019-07-11 | End: 2019-08-30 | Stop reason: SDUPTHER

## 2019-07-11 NOTE — TELEPHONE ENCOUNTER
----- Message from Jenifer Patel sent at 7/11/2019 11:57 AM CDT -----  Contact: Pt self Mobile [phone] or Home [phone]  Patient is calling for an RX refill or new RX.  Is this a refill or new RX: Refill   RX name and strength: ALPRAZolam (XANAX) 0.25 MG tablet  Directions (copy/paste from chart):  N/A  Is this a 30 day or 90 day RX:  30  Local pharmacy or mail order pharmacy:[email or web address] [address] [place] [address] AT [facility]    Pharmacy name and phone # [facility] Phone# [phone], Fax# [phone]  Comment: Patient would like a call when her script is filled.

## 2019-07-18 DIAGNOSIS — Z12.39 BREAST SCREENING: Primary | ICD-10-CM

## 2019-07-24 ENCOUNTER — HOSPITAL ENCOUNTER (OUTPATIENT)
Dept: RADIOLOGY | Facility: HOSPITAL | Age: 59
Discharge: HOME | End: 2019-07-24
Attending: SPECIALIST
Payer: COMMERCIAL

## 2019-07-24 DIAGNOSIS — Z00.00 ANNUAL PHYSICAL EXAM: ICD-10-CM

## 2019-07-24 DIAGNOSIS — Z12.39 BREAST CANCER SCREENING: ICD-10-CM

## 2019-07-24 PROCEDURE — 77067 SCR MAMMO BI INCL CAD: CPT | Mod: 26,,, | Performed by: RADIOLOGY

## 2019-07-24 PROCEDURE — 77067 SCR MAMMO BI INCL CAD: CPT | Mod: TC

## 2019-07-24 PROCEDURE — 77067 MAMMO DIGITAL SCREENING BILAT WITH CAD: ICD-10-PCS | Mod: 26,,, | Performed by: RADIOLOGY

## 2019-08-14 ENCOUNTER — TELEPHONE (OUTPATIENT)
Dept: GASTROENTEROLOGY | Facility: CLINIC | Age: 59
End: 2019-08-14

## 2019-08-15 ENCOUNTER — OFFICE VISIT (OUTPATIENT)
Dept: GASTROENTEROLOGY | Facility: CLINIC | Age: 59
End: 2019-08-15
Payer: COMMERCIAL

## 2019-08-15 VITALS
SYSTOLIC BLOOD PRESSURE: 122 MMHG | HEART RATE: 94 BPM | WEIGHT: 146.63 LBS | DIASTOLIC BLOOD PRESSURE: 76 MMHG | HEIGHT: 60 IN | BODY MASS INDEX: 28.79 KG/M2

## 2019-08-15 DIAGNOSIS — K58.2 IRRITABLE BOWEL SYNDROME WITH BOTH CONSTIPATION AND DIARRHEA: Primary | ICD-10-CM

## 2019-08-15 DIAGNOSIS — K21.9 GASTROESOPHAGEAL REFLUX DISEASE, ESOPHAGITIS PRESENCE NOT SPECIFIED: ICD-10-CM

## 2019-08-15 PROCEDURE — 3078F PR MOST RECENT DIASTOLIC BLOOD PRESSURE < 80 MM HG: ICD-10-PCS | Mod: CPTII,S$GLB,, | Performed by: NURSE PRACTITIONER

## 2019-08-15 PROCEDURE — 99214 OFFICE O/P EST MOD 30 MIN: CPT | Mod: S$GLB,,, | Performed by: NURSE PRACTITIONER

## 2019-08-15 PROCEDURE — 3078F DIAST BP <80 MM HG: CPT | Mod: CPTII,S$GLB,, | Performed by: NURSE PRACTITIONER

## 2019-08-15 PROCEDURE — 3008F PR BODY MASS INDEX (BMI) DOCUMENTED: ICD-10-PCS | Mod: CPTII,S$GLB,, | Performed by: NURSE PRACTITIONER

## 2019-08-15 PROCEDURE — 3074F SYST BP LT 130 MM HG: CPT | Mod: CPTII,S$GLB,, | Performed by: NURSE PRACTITIONER

## 2019-08-15 PROCEDURE — 3008F BODY MASS INDEX DOCD: CPT | Mod: CPTII,S$GLB,, | Performed by: NURSE PRACTITIONER

## 2019-08-15 PROCEDURE — 99214 PR OFFICE/OUTPT VISIT, EST, LEVL IV, 30-39 MIN: ICD-10-PCS | Mod: S$GLB,,, | Performed by: NURSE PRACTITIONER

## 2019-08-15 PROCEDURE — 99999 PR PBB SHADOW E&M-EST. PATIENT-LVL III: ICD-10-PCS | Mod: PBBFAC,,, | Performed by: NURSE PRACTITIONER

## 2019-08-15 PROCEDURE — 3074F PR MOST RECENT SYSTOLIC BLOOD PRESSURE < 130 MM HG: ICD-10-PCS | Mod: CPTII,S$GLB,, | Performed by: NURSE PRACTITIONER

## 2019-08-15 PROCEDURE — 99999 PR PBB SHADOW E&M-EST. PATIENT-LVL III: CPT | Mod: PBBFAC,,, | Performed by: NURSE PRACTITIONER

## 2019-08-15 RX ORDER — TRAMADOL HYDROCHLORIDE 50 MG/1
TABLET ORAL
Refills: 2 | COMMUNITY
Start: 2019-08-01 | End: 2022-03-29

## 2019-08-15 RX ORDER — OMEPRAZOLE 20 MG/1
CAPSULE, DELAYED RELEASE ORAL
Qty: 90 CAPSULE | Refills: 3 | Status: SHIPPED | OUTPATIENT
Start: 2019-08-15 | End: 2021-07-14 | Stop reason: SDUPTHER

## 2019-08-15 RX ORDER — NAPROXEN 500 MG/1
TABLET ORAL
Refills: 2 | COMMUNITY
Start: 2019-07-01 | End: 2021-04-05 | Stop reason: SDUPTHER

## 2019-08-15 NOTE — PATIENT INSTRUCTIONS
Colonoscopy is due in 2020    [email or web address].[email or web address]  Dropping Acid The Reflux Diet Cookbook and Cure -  Aj Bustos M.D.    GERD  Worst Foods for Acid Reflux  Chocolate (milk chocolate worse than dark chocolate)  Soda (all carbonated beverages)  Alcohol (beer, liquor, wine)  Fried foods  Velazquez, sausage, ribs  Cream sauce  Fatty meats (beef)  Butter, margarine, lard, shortening  Coffee, tea  Mint   High fat nuts  Hot sauces and pepper  Citrus fruit/juices      Acidic foods (pH - 1 is MORE acidic, 5 is LESS acidic)     Do not eat or drink these (lower numbers are worse)    Induction diet - For 2 weeks eat nothing below pH 5     Lemon juice 2.3  Grape cranberry juice 2.5  Stomach Acid 2.5  Gelatin Dessert 2.6  Lemon/lime 2.9/2.7  Vinegar 2.9  Gatorade 3.0  Fruits - plums, apricots, strawberries, cherries 3.0  Vitamin C (ascorbic acid) 3.0  Iced tea, Snapple 3.1  Mustard 3.2  Soft drinks 3.3  Nectarines 3.3  Pomegranate 3.3  Applesauce 3.4  Grapefruit 3.4  Kiwi 3.4  Barbecue sauce 3.4  Caesar dressing 3.5  Thousand island dressing 3.6  Strawberries 3.5  Pineapple juice 3.5  Beer 3.5  Wine 3.5  Grape 3.6  Apples 3.6  Pineapple 3.7  Pickle 3.7  Blackberries, blueberries 3.7  Terrence 3.7  Orange 3.8  Cherries 3.9  Red Bull 3.9  Tomatoes 4.2  Coffee 5.1      These are Safe foods:  Agave  Aloe Vera  Apple (only red)  Bagels  Banana (worsens reflux in 1%)  Beans - black, red, lima, lentils  Bread - whole grain, rye  Caramel  Celery  Chamomile tea  Chicken - skinless, never fried  Chicken stock or bouillon  Coffee - one cup/day with milk  Fennel  Fish  Ladonna  Green vegetables (no green peppers)  Herbs  Honey  Melon  Milk - skin, soy, or Lactaid skim milk  Mushrooms  Oatmeal  Olive oil  Parsley  Pasta  Pears  Popcorn  Potatoes  Red bell peppers  Rice  Soups  Tofu  Turkey Breast  Turnip  Vegetables - no onion, tomatoes, peppers  Vinaigrette  Water - non carbonated  Whole grain breads, crackers, breakfast  cereals      Best Foods for Acid Reflux  Whole grain breads  Oatmeal  Aloe Vera  Salad (no tomatoes, onions, cheese, or high fat dressing)  Banana  Melon  Fennel  Chicken and turkey (skinless, never fried)  Fish/seafood (never fried)  Celery  Parsley  Couscous and Rice    Maybe bad foods (Everyone is unique)  Tomatoes  Garlic  Onion  Nuts (macadamia nuts)  Apples (especially green)  Cucumber  Green peppers  Spicy food  Some herbal teas    GERD tips  Change what you eat:  Eat smaller meals  Eat slowly and chew thoroughly until food is almost liquid  Cut down on junk carbohydrates such as sugar and white flour  Use herbs in your cooking  Eat more raw foods (more than 10 ingredients is not a raw food)  Avoid trans fats and partially hydrogenated oils  Eat more fish and switch to grass fed beef  Switch your cooking oil to macadamia nut or olive oil  Watch extremes of salt intake (too high or too low is bad)    If just cutting out acidic foods is not enough, change how you eat:  Large breakfast, medium lunch, light dinner  Dont mix fruit juices, sweet fruits, and refined starches with meats and heavy food  Dont wash your food down with a lot of liquid      Change these habits:  Stop smoking  Eat dinner earlier (3-4 hours before lying down to sleep)  Elevate the head of your bed 6 inches (blocks under the head of the bed are better than pillows) OR [employer] sells a special MedCline Reflux relief system  That may be purchased online for a comfortable, individual solution for raising the head of the bed.  Exercise (but wait 2 hours after eating)  Drink more water (between meals)    Take these supplements:  Multi vitamin  Probiotic  Fish oil    Most common food allergens: milk, eggs, peanuts, tree nuts, fish, shellfish, wheat, and soy    All natural immediate relief:  Chew 2-3 soft probiotic capsules - Dr. Francis's Probiotics 12 Plus  Chew chewable DGL licorice tablet  Chew papaya tablet with high protein meal -  [employer] [facility]  Drink 2 ounces of aloe vera juice  Swedish bitters  Prelief- reduce the acid in food to keep it form burning sensitive tissue  Iberogast  Slippery Elm  Drink Chamomile Tea  Teaspoon of baking soda in water  Spoonful of vinegar in water      All natural ulcer healers:  Zinc carnosine - 75.5 mg with food twice a day x 8 weeks   Марина by Manju - $8 for 60 pills  DGL (deglycyrrhizinated licorice) - 2 tablets before meals. Heals stomach lining   Natural Factors brand, Enzymatic therapy brand.  Aloe Vera juice  - 2 to 8 ounces a day   [place] or [place]

## 2019-08-15 NOTE — PROGRESS NOTES
[facility] Consultation Note    Reason for Consult:  The primary encounter diagnosis was Irritable bowel syndrome with both constipation and diarrhea. A diagnosis of Gastroesophageal reflux disease, esophagitis presence not specified was also pertinent to this visit.    PCP:   India Camacho       Referring MD:  No referring provider defined for this encounter.    HPI:  This is a 58 y.o. female here for evaluation of IBS and GERD.  She is an established patient last seen by Dr. Davenport 10/29/2018 for the same.  She is new to me.    She is here for routine follow up and refill.   Right now she states her GI symptoms are stable. Having about 4 BM per day. If she eats a lot, she will have a BM. Its soft, not watery. This is her normal. She is satisfied with this.  She only takes bentyl occasionally.   No hematochezia. No melena.   No n/v    GERD well controlled right now. Taking Omeprazole only when she eats spicy foods. She states she hasn't gotten it refilled in so long.    Meds tried for Ibs:  Xifaxin- no help  Bentyl-helps      ROS:  Constitutional: No fevers, chills, No weight loss  ENT: + allergies  CV: No chest pain  Pulm: + cough, No shortness of breath  GI: see HPI  Derm: No rash  MSK: No arthritis    Medical History:  has a past medical history of Anxiety, AR (allergic rhinitis) (3/10/2014), Diverticulosis (2010), Family history of breast cancer in sister, Genital herpes, Hiatal hernia (2010), Meningioma (3/10/2014), and Post herpetic neuralgia (1/21/2014).    Surgical History:  has a past surgical history that includes Oophorectomy (Right, 1970s) and Carpal tunnel release (Right, 1990s).    Family History: family history includes Bladder Cancer in her father; Breast cancer (age of onset: 45) in her sister; Heart disease (age of onset: 65) in her mother; Hypertension in her mother; Kidney disease in her mother; No Known Problems in her brother..     Social History:  reports that she has  never smoked. She has never used smokeless tobacco. She reports that she drinks alcohol. She reports that she does not use drugs.    Review of patient's allergies indicates:   Allergen Reactions    Codeine Itching    Sulfa (sulfonamide antibiotics) Itching       Current Outpatient Medications on File Prior to Visit   Medication Sig Dispense Refill    ALPRAZolam (XANAX) 0.25 MG tablet Take 1 tablet (0.25 mg total) by mouth 2 (two) times daily as needed for Anxiety. 60 tablet 0    dicyclomine (BENTYL) 10 MG capsule Take 1 capsule (10 mg total) by mouth 3 (three) times daily as needed (abdominal pain). 90 capsule 5    gabapentin (NEURONTIN) 300 MG capsule take UP TO 6 CAPSULES A  capsule 3    loratadine (CLARITIN) 10 mg tablet Take 1 tablet (10 mg total) by mouth daily as needed for Allergies. 90 tablet 3    naproxen (NAPROSYN) 500 MG tablet TK 1 T PO  BID WITH FOOD  2    traMADol (ULTRAM) 50 mg tablet TAKE 1 T BY MOUTH EVERY 6 HOURS AS NEEDED  2    valACYclovir (VALTREX) 500 MG tablet Take 1 tablet (500 mg total) by mouth daily as needed. 30 tablet 0    [DISCONTINUED] omeprazole (PRILOSEC) 20 MG capsule take 1 capsule by mouth once daily BEFORE BREAKFAST (Patient taking differently: take 1 capsule by mouth once daily BEFORE BREAKFAST, PRN) 90 capsule 3    cyclobenzaprine (FLEXERIL) 10 MG tablet TK 1/2 TO 1 T PO QHS PRN  1    [DISCONTINUED] EPINEPHrine (EPIPEN) 0.3 mg/0.3 mL AtIn Inject 0.3 mLs (0.3 mg total) into the muscle once. for 1 dose 0.3 mL 0    [DISCONTINUED] fluticasone (FLONASE) 50 mcg/actuation nasal spray SHAKE LQ AND U 2 SPRAYS IEN QD.  2    [DISCONTINUED] olopatadine (PATADAY) 0.2 % Drop INT 1 GTT IN OU D PRN  2     No current facility-administered medications on file prior to visit.          Objective Findings:    Vital Signs:  /76 (BP Location: Right arm)   Pulse 94   Ht 5' (1.524 m)   Wt 66.5 kg (146 lb 9.7 oz)   LMP 01/01/2000 (Within Months)   BMI 28.63 kg/m²   Body  "mass index is 28.63 kg/m².    Physical Exam:  General Appearance: Well appearing in no acute distress  Head:   Normocephalic, without obvious abnormality  Eyes:    No scleral icterus  ENT: Neck supple  Extremities: No edema  Skin: No rash  Neurologic: AAO x 3      Labs:  Lab Results   Component Value Date    WBC 4.3 05/04/2019    HGB 13.2 05/04/2019    HCT 39.3 05/04/2019     05/04/2019    CRP 0.64 03/15/2014    CHOL 223 (H) 05/04/2019    TRIG 102 05/04/2019    HDL 74 05/04/2019    ALT 18 05/04/2019    AST 24 05/04/2019     05/04/2019    K 4.4 05/04/2019     05/04/2019    CREATININE 0.67 05/04/2019    BUN 19 05/04/2019    CO2 22 05/04/2019    TSH 2.11 05/04/2019    HGBA1C 5.4 05/04/2019       Imaging:  None reviewed     Endoscopies  No reports found in media tab. According to Dr. Davenport's clinic note 9/13/2016:  "EGD - 1/3/14 by Dr. Briones; heartburn and non-cardiac chest pain; irregular z-line (biopsied); white plaques in the middle/lower esophagus (biopsied); patchy stomach inflammation (biopsied); normal duodenum.  EGD - 11/4/10 by Dr. Briones; dyspepsia, nausea, weight loss; small hiatal hernia; gastric inflammation (biopsied); normal duodenum (biopsied).  COLONOSCOPY - 11/4/10 by Dr. Briones; good to cecum; diverticulosis; otherwise normal."    Assessment:    Ms. Horvath is a 58 y.o. BF with:    1. Irritable bowel syndrome with both constipation and diarrhea    2. Gastroesophageal reflux disease, esophagitis presence not specified      Right now she states her GI symptoms are stable. Only needs her omeprazole occasionally. Has not needed Bentyl in months.    She is going to try a GERD diet. This was discussed. We also discussed her colonoscopy repeat is due 11/2020.    Recommendations:  1. Continue with the Bentyl PRN  2.Continue with the Prilosec PRN  3. GERD diet.     25 MINUTES TOTAL FACE TO FACE >50% SPENT IN COUNSELING AND COORDINATION OF CARE    Order summary:  Orders Placed This " Encounter    omeprazole (PRILOSEC) 20 MG capsule         Thank you so much for allowing me to participate in the care of TERI TroyC

## 2019-08-30 RX ORDER — BENZONATATE 100 MG/1
100 CAPSULE ORAL 3 TIMES DAILY PRN
Qty: 30 CAPSULE | Refills: 1 | Status: SHIPPED | OUTPATIENT
Start: 2019-08-30 | End: 2019-09-09

## 2019-08-30 RX ORDER — ALPRAZOLAM 0.25 MG/1
0.25 TABLET ORAL 2 TIMES DAILY PRN
Qty: 60 TABLET | Refills: 0 | Status: SHIPPED | OUTPATIENT
Start: 2019-08-30 | End: 2019-10-03 | Stop reason: SDUPTHER

## 2019-08-30 NOTE — TELEPHONE ENCOUNTER
----- Message from Keisha Mcclain sent at 8/30/2019 10:32 AM CDT -----  Contact: self/[phone]  Type: Rx    Name of medication(s): ALPRAZolam (XANAX) 0.25 MG tablet    Is this a refill? New rx? Refill     Who prescribed medication? Dr. Camacho    Pharmacy Name, Phone, & Location[id number] [address] AT [facility]    Comments: Please call and advise.    Thank You

## 2019-09-03 ENCOUNTER — OFFICE VISIT (OUTPATIENT)
Dept: NEUROLOGY | Facility: CLINIC | Age: 59
End: 2019-09-03
Payer: COMMERCIAL

## 2019-09-03 VITALS
HEART RATE: 89 BPM | SYSTOLIC BLOOD PRESSURE: 113 MMHG | WEIGHT: 148.81 LBS | HEIGHT: 60 IN | BODY MASS INDEX: 29.22 KG/M2 | DIASTOLIC BLOOD PRESSURE: 62 MMHG

## 2019-09-03 DIAGNOSIS — B02.29 POST HERPETIC NEURALGIA: Primary | ICD-10-CM

## 2019-09-03 PROCEDURE — 3074F SYST BP LT 130 MM HG: CPT | Mod: CPTII,S$GLB,, | Performed by: NEUROMUSCULOSKELETAL MEDICINE & OMM

## 2019-09-03 PROCEDURE — 99213 PR OFFICE/OUTPT VISIT, EST, LEVL III, 20-29 MIN: ICD-10-PCS | Mod: S$GLB,,, | Performed by: NEUROMUSCULOSKELETAL MEDICINE & OMM

## 2019-09-03 PROCEDURE — 99999 PR PBB SHADOW E&M-EST. PATIENT-LVL III: CPT | Mod: PBBFAC,,, | Performed by: NEUROMUSCULOSKELETAL MEDICINE & OMM

## 2019-09-03 PROCEDURE — 3008F PR BODY MASS INDEX (BMI) DOCUMENTED: ICD-10-PCS | Mod: CPTII,S$GLB,, | Performed by: NEUROMUSCULOSKELETAL MEDICINE & OMM

## 2019-09-03 PROCEDURE — 3078F DIAST BP <80 MM HG: CPT | Mod: CPTII,S$GLB,, | Performed by: NEUROMUSCULOSKELETAL MEDICINE & OMM

## 2019-09-03 PROCEDURE — 3078F PR MOST RECENT DIASTOLIC BLOOD PRESSURE < 80 MM HG: ICD-10-PCS | Mod: CPTII,S$GLB,, | Performed by: NEUROMUSCULOSKELETAL MEDICINE & OMM

## 2019-09-03 PROCEDURE — 99213 OFFICE O/P EST LOW 20 MIN: CPT | Mod: S$GLB,,, | Performed by: NEUROMUSCULOSKELETAL MEDICINE & OMM

## 2019-09-03 PROCEDURE — 99999 PR PBB SHADOW E&M-EST. PATIENT-LVL III: ICD-10-PCS | Mod: PBBFAC,,, | Performed by: NEUROMUSCULOSKELETAL MEDICINE & OMM

## 2019-09-03 PROCEDURE — 3074F PR MOST RECENT SYSTOLIC BLOOD PRESSURE < 130 MM HG: ICD-10-PCS | Mod: CPTII,S$GLB,, | Performed by: NEUROMUSCULOSKELETAL MEDICINE & OMM

## 2019-09-03 PROCEDURE — 3008F BODY MASS INDEX DOCD: CPT | Mod: CPTII,S$GLB,, | Performed by: NEUROMUSCULOSKELETAL MEDICINE & OMM

## 2019-09-03 NOTE — PROGRESS NOTES
Progress Notes        Progress Notes       This note was dictated with Modal Fluency a word recognition program. There are occasionally word recognition errors which are missed on review.  Present illness:  patient presents for follow-up for her post herpetic neuralgia.  She is doing well on gabapentin 300 mg 3-4 times per day.  She typically very is a dose.  She does notice that the weather change still aggravates the condition      Previous note:  4-20-18:  Patient presents for follow-up for postherpetic neuralgia.  She is taking gabapentin 300 mg to-3 tablets per day with good relief.  Occasionally she has very little pain but sometimes the pain will be 8/10 when it does come.  We discussed altering the dose with a baseline dose of twice a day with an option to increase by 1 or 2 pills extra particularly when weather fronts come.  Previous note: 9-18-17: Patient presents for follow-up for her postherpetic neuralgia.  She generally takes to-3 gabapentin 300 mg per day occasionally she needs to take more.  She does notice a flareup of the pain with barometric pressure changes from weather fronts.  She has had occasional chest pain and anxiety jitteriness if she takes the gabapentin during the day with her nerve medicine.       Patient presents for follow-up for her postherpetic neuralgia of the thoracic spine.  Pain is been quiet as long as she takes the gabapentin on a regular basis.  She does note that if she misses a dose she can tell the difference.  Previous note: 6-20-16: Patient returns for follow-up for postherpetic thoracic neuralgia.  She takes gabapentin 300 mg 3 times a day however notices the pain seems to be coming on sooner than her next  dosage.  She does note that the weather fronts also cause increased pain.     Previous note: 1-25-16: Patient continues with postherpetic thoracic neuralgia pain off and on. She has no problems sleeping at night with the tranquilizers. She takes gabapentin 300 mg 3  times a day however probably needs to increase this dosage by 1 or 2 pills per day if needed. She does complain of intermittent headaches with some photophobia associated with the headaches.        Motor examination: Upper and Lower extremities - Normal and symmetrical;muscle tone was normal ; right-handed    Sensory examination:Upper and lower extremities - Pinprick and soft touch were normal and symmetrical. Vibration sense was normal at the toes for age 15-20 seconds    Deep tendon reflexes were 1-2+ symmetrical. Both plantar responses were flexor    Cerebellar examination upper: Normal finger to nose and rapid alternating movements    Gait: Steady with no ataxia; heel and toe walk normal    Romberg test: negative Tandem gait: Normal    Involuntary movements: Negative    Impression: Right temporal pain-muscle tenderness; Shingles with postherpetic neuralgia right T1-T3 thoracic area; headaches; Meningioma    Recommendations/plan: Continue gabapentin 300 mg 2-6 pills per day if needed.  Again  suggested avoiding taking the medicine during the day with an anxiety medicine.  Also suggested anticipating weather fronts and taking extra gabapentin prior to onset of the pain in that situation.  Follow-up 1 year

## 2019-09-17 ENCOUNTER — PATIENT OUTREACH (OUTPATIENT)
Dept: ADMINISTRATIVE | Facility: HOSPITAL | Age: 59
End: 2019-09-17

## 2019-09-17 NOTE — LETTER
AUTHORIZATION FOR RELEASE OF   CONFIDENTIAL INFORMATION    Dear Dr. Briones,    We are seeing Val Horvath, date of birth 1960, in the clinic at [facility]. India Camacho MD is the patient's PCP. Val Horvath has an outstanding lab/procedure at the time we reviewed her chart. In order to help keep her health information updated, she has authorized us to request the following medical record(s):        (  )  MAMMOGRAM                                      (X)  COLONOSCOPY      (  )  PAP SMEAR                                          (  )  OUTSIDE LAB RESULTS     (  )  DEXA SCAN                                          (  )  EYE EXAM            (  )  FOOT EXAM                                          (  )  ENTIRE RECORD     (  )  OUTSIDE IMMUNIZATIONS                 (  )  _______________         Please fax records to [facility], Miriam C Azuoru, MD, [phone]     If you have any questions, please contact REDD Iglesias at [phone]          Patient Name: Val Horvath  : 1960  Patient Phone #: [phone]

## 2019-10-03 RX ORDER — ALPRAZOLAM 0.25 MG/1
0.25 TABLET ORAL 2 TIMES DAILY PRN
Qty: 60 TABLET | Refills: 0 | Status: SHIPPED | OUTPATIENT
Start: 2019-10-03 | End: 2019-10-30 | Stop reason: SDUPTHER

## 2019-10-03 NOTE — TELEPHONE ENCOUNTER
----- Message from Aurea Wiggins sent at 10/3/2019  4:13 PM CDT -----  Contact: [phone]  Type: Rx    Name of medication(s): ALPRAZolam (XANAX) 0.25 MG tablet    Is this a refill? New rx? refill    Who prescribed medication?  Janice    Pharmacy Name, Phone, & Location: [facility][id number] [address] [facility]     Comments:  Please advise, thank you

## 2019-10-30 NOTE — TELEPHONE ENCOUNTER
----- Message from Clotilde Nicholas sent at 10/30/2019  2:39 PM CDT -----  Contact: self/[phone]  Patient is calling for an RX refill or new RX.  Is this a refill or new RX:  New Rx 1  RX name and strength: cyclobenzaprine (FLEXERIL) 10 MG tablet  Directions (copy/paste from chart):    Is this a 30 day or 90 day RX:    Local pharmacy or mail order pharmacy:  Local pharmacy  Pharmacy name and phone # (copy/paste from chart):   [facility] #[id number] [place] [id number] [facility] AT [facility] [phone] (Phone) [phone] (Fax)  Comments:  Please call patient         Patient is calling for an RX refill or new RX.  Is this a refill or new RX:  New Rx 2  RX name and strength: ALPRAZolam (XANAX) 0.25 MG tabletDirections (copy/paste from chart):    Is this a 30 day or 90 day RX:    Local pharmacy or mail order pharmacy:  Local pharmacy  Pharmacy name and phone # (copy/paste from chart):   [facility] #[id number] [place], [place] - [address] [facility] AT [facility] [phone] (Phone) [phone] (Fax)  Comments:  Please call patient

## 2019-10-31 RX ORDER — ALPRAZOLAM 0.25 MG/1
0.25 TABLET ORAL 2 TIMES DAILY PRN
Qty: 60 TABLET | Refills: 1 | Status: SHIPPED | OUTPATIENT
Start: 2019-10-31 | End: 2019-12-30 | Stop reason: SDUPTHER

## 2019-10-31 RX ORDER — VALACYCLOVIR HYDROCHLORIDE 500 MG/1
500 TABLET, FILM COATED ORAL DAILY PRN
Qty: 30 TABLET | Refills: 0 | Status: SHIPPED | OUTPATIENT
Start: 2019-10-31 | End: 2020-03-23 | Stop reason: SDUPTHER

## 2019-10-31 RX ORDER — CYCLOBENZAPRINE HCL 10 MG
TABLET ORAL
Refills: 1 | Status: CANCELLED | OUTPATIENT
Start: 2019-10-31

## 2019-12-02 RX ORDER — GABAPENTIN 300 MG/1
CAPSULE ORAL
Qty: 180 CAPSULE | Refills: 3 | Status: SHIPPED | OUTPATIENT
Start: 2019-12-02 | End: 2020-08-14 | Stop reason: SDUPTHER

## 2019-12-02 NOTE — TELEPHONE ENCOUNTER
----- Message from Caryn Moore sent at 12/2/2019  9:09 AM CST -----  Contact: pt: [phone]  Rx Refill/Request     Is this a Refill or New Rx: refill     Rx Name and Strength:  gabapentin (NEURONTIN) 300 MG     Preferred Pharmacy with phone number: [facility] #[id number] [address] AT [facility] [phone] (Phone)  [phone] (Fax)     Communication Preference: pt: [phone]

## 2019-12-30 RX ORDER — ALPRAZOLAM 0.25 MG/1
0.25 TABLET ORAL 2 TIMES DAILY PRN
Qty: 60 TABLET | Refills: 2 | Status: SHIPPED | OUTPATIENT
Start: 2019-12-30 | End: 2020-03-23 | Stop reason: SDUPTHER

## 2019-12-30 NOTE — TELEPHONE ENCOUNTER
----- Message from Tosha Calles sent at 12/30/2019 12:48 PM CST -----  Contact: patient [phone]   Patient is calling for an RX refill or new RX.  Is this a refill or new RX:  refill  RX name and strength: [facility] #[id number] [id number] [facility] AT [facility]  Directions :  Take 1 tablet (0.25 mg total) by mouth 2 (two) times daily as needed for Anxiety  Is this a 30 day or 90 day RX:  30  Local pharmacy or mail order pharmacy:  local  Pharmacy name and phone #   [facility] #[id number] [place], [place] - [address] [facility] AT [facility] [phone]     Comments:  Patient has been trying to get a refill and states that she has had no response. Please call to advise pt when this will be ordered.

## 2020-03-05 ENCOUNTER — TELEPHONE (OUTPATIENT)
Dept: NEUROLOGY | Facility: CLINIC | Age: 60
End: 2020-03-05

## 2020-03-05 ENCOUNTER — TELEPHONE (OUTPATIENT)
Dept: GASTROENTEROLOGY | Facility: CLINIC | Age: 60
End: 2020-03-05

## 2020-03-05 NOTE — TELEPHONE ENCOUNTER
----- Message from Faye Robledo sent at 3/5/2020 11:18 AM CST -----  Contact: pt  Pt would like to know if she is due for a colonoscopy. Please give pt a call back at [phone] .

## 2020-03-05 NOTE — TELEPHONE ENCOUNTER
----- Message from Faye Robledo sent at 3/5/2020 11:21 AM CST -----  Contact: pt  Pt would like to know when she is due for her annual f/u. Please give pt a call back at [phone] .

## 2020-03-23 RX ORDER — ALPRAZOLAM 0.25 MG/1
0.25 TABLET ORAL 2 TIMES DAILY PRN
Qty: 60 TABLET | Refills: 2 | Status: SHIPPED | OUTPATIENT
Start: 2020-03-23 | End: 2020-07-07 | Stop reason: SDUPTHER

## 2020-03-23 RX ORDER — VALACYCLOVIR HYDROCHLORIDE 500 MG/1
500 TABLET, FILM COATED ORAL DAILY PRN
Qty: 30 TABLET | Refills: 0 | Status: SHIPPED | OUTPATIENT
Start: 2020-03-23 | End: 2020-04-23 | Stop reason: SDUPTHER

## 2020-03-23 NOTE — TELEPHONE ENCOUNTER
----- Message from Carol Triana sent at 3/23/2020  9:25 AM CDT -----  Contact: Pt [phone]  Is this a refill or new RX:  Refill     RX name and strength: valACYclovir (VALTREX) 500 MG tablet and ALPRAZolam (XANAX) 0.25 MG tablet      Pharmacy name and phone # [x+1] [facility] #[id number] - [address] AT [facility] [phone] (Phone) [phone] (Fax)

## 2020-04-06 ENCOUNTER — TELEPHONE (OUTPATIENT)
Dept: INTERNAL MEDICINE | Facility: CLINIC | Age: 60
End: 2020-04-06

## 2020-04-06 NOTE — TELEPHONE ENCOUNTER
----- Message from Olayinka Ramírez sent at 4/6/2020  9:45 AM CDT -----  Contact: self   Requesting an RX refill or new RX.  Is this a refill or new RX: refill   RX name and strength: ALPRAZolam (XANAX) 0.25 MG tablet  Directions (copy/paste from chart):    Is this a 30 day or 90 day RX:  60  Local pharmacy or mail order pharmacy:    Pharmacy name and phone # (copy/paste from chart):     Comments:

## 2020-04-23 RX ORDER — VALACYCLOVIR HYDROCHLORIDE 500 MG/1
500 TABLET, FILM COATED ORAL DAILY PRN
Qty: 30 TABLET | Refills: 3 | Status: SHIPPED | OUTPATIENT
Start: 2020-04-23 | End: 2021-01-21 | Stop reason: SDUPTHER

## 2020-04-23 NOTE — TELEPHONE ENCOUNTER
----- Message from Tosha Shook sent at 4/23/2020 10:25 AM CDT -----  Contact: self/[phone]  Pt called in regards to talking to the office about her 5-1-20. She would like a call back ASAP.     Please advise

## 2020-05-20 ENCOUNTER — OFFICE VISIT (OUTPATIENT)
Dept: INTERNAL MEDICINE | Facility: CLINIC | Age: 60
End: 2020-05-20
Payer: COMMERCIAL

## 2020-05-20 ENCOUNTER — TELEPHONE (OUTPATIENT)
Dept: GASTROENTEROLOGY | Facility: CLINIC | Age: 60
End: 2020-05-20

## 2020-05-20 VITALS
BODY MASS INDEX: 29.3 KG/M2 | SYSTOLIC BLOOD PRESSURE: 118 MMHG | WEIGHT: 149.25 LBS | DIASTOLIC BLOOD PRESSURE: 60 MMHG | TEMPERATURE: 98 F | HEART RATE: 66 BPM | OXYGEN SATURATION: 98 % | RESPIRATION RATE: 16 BRPM | HEIGHT: 60 IN

## 2020-05-20 DIAGNOSIS — D22.9 SKIN MOLE: ICD-10-CM

## 2020-05-20 DIAGNOSIS — F41.0 GENERALIZED ANXIETY DISORDER WITH PANIC ATTACKS: ICD-10-CM

## 2020-05-20 DIAGNOSIS — Z00.00 ANNUAL PHYSICAL EXAM: Primary | ICD-10-CM

## 2020-05-20 DIAGNOSIS — H61.23 BILATERAL IMPACTED CERUMEN: ICD-10-CM

## 2020-05-20 DIAGNOSIS — F41.1 GENERALIZED ANXIETY DISORDER WITH PANIC ATTACKS: ICD-10-CM

## 2020-05-20 DIAGNOSIS — K21.9 GASTROESOPHAGEAL REFLUX DISEASE WITHOUT ESOPHAGITIS: ICD-10-CM

## 2020-05-20 PROCEDURE — 3078F DIAST BP <80 MM HG: CPT | Mod: CPTII,S$GLB,, | Performed by: HOSPITALIST

## 2020-05-20 PROCEDURE — 99396 PREV VISIT EST AGE 40-64: CPT | Mod: S$GLB,,, | Performed by: HOSPITALIST

## 2020-05-20 PROCEDURE — 99999 PR PBB SHADOW E&M-EST. PATIENT-LVL IV: CPT | Mod: PBBFAC,,, | Performed by: HOSPITALIST

## 2020-05-20 PROCEDURE — 99396 PR PREVENTIVE VISIT,EST,40-64: ICD-10-PCS | Mod: S$GLB,,, | Performed by: HOSPITALIST

## 2020-05-20 PROCEDURE — 3074F SYST BP LT 130 MM HG: CPT | Mod: CPTII,S$GLB,, | Performed by: HOSPITALIST

## 2020-05-20 PROCEDURE — 99999 PR PBB SHADOW E&M-EST. PATIENT-LVL IV: ICD-10-PCS | Mod: PBBFAC,,, | Performed by: HOSPITALIST

## 2020-05-20 PROCEDURE — 3078F PR MOST RECENT DIASTOLIC BLOOD PRESSURE < 80 MM HG: ICD-10-PCS | Mod: CPTII,S$GLB,, | Performed by: HOSPITALIST

## 2020-05-20 PROCEDURE — 3074F PR MOST RECENT SYSTOLIC BLOOD PRESSURE < 130 MM HG: ICD-10-PCS | Mod: CPTII,S$GLB,, | Performed by: HOSPITALIST

## 2020-05-20 NOTE — TELEPHONE ENCOUNTER
----- Message from Sarah Conner sent at 5/20/2020 10:40 AM CDT -----  Contact: pt [phone]  Pt would like to speak to the nurse please call back

## 2020-05-20 NOTE — TELEPHONE ENCOUNTER
"Patient states, "I know my colonoscopy not due till November of this year, but I wanted to know if I have to make an appointment to see the doctor before then."  Will message Dr Davenport to determine if patient needs an appointment.   "

## 2020-05-21 NOTE — TELEPHONE ENCOUNTER
"Patient notified that Dr Davenport said he does not need to see her prior to colonoscopy in November unless she develops any intestinal issues. Patient verbalized understanding. . She states, "He had told me to avoid foods with acid.  I just wanted him to know that I have been having orange juice for my hypoglycemia."  Patient denies that the orange juice causing any issues with her stomach. She feels she does not need an appointment at present. Phone number to clinic given to patient with instructions to call if she develops any issues that she feels needs to be addressed by provider.    "

## 2020-05-25 ENCOUNTER — OFFICE VISIT (OUTPATIENT)
Dept: OTOLARYNGOLOGY | Facility: CLINIC | Age: 60
End: 2020-05-25
Payer: COMMERCIAL

## 2020-05-25 ENCOUNTER — TELEPHONE (OUTPATIENT)
Dept: INTERNAL MEDICINE | Facility: CLINIC | Age: 60
End: 2020-05-25

## 2020-05-25 DIAGNOSIS — J30.9 ALLERGIC RHINITIS, UNSPECIFIED SEASONALITY, UNSPECIFIED TRIGGER: Primary | ICD-10-CM

## 2020-05-25 DIAGNOSIS — H61.23 BILATERAL IMPACTED CERUMEN: ICD-10-CM

## 2020-05-25 PROCEDURE — 69210 REMOVE IMPACTED EAR WAX UNI: CPT | Mod: S$GLB,,, | Performed by: NURSE PRACTITIONER

## 2020-05-25 PROCEDURE — 69210 EAR CERUMEN REMOVAL: ICD-10-PCS | Mod: S$GLB,,, | Performed by: NURSE PRACTITIONER

## 2020-05-25 PROCEDURE — 99999 PR PBB SHADOW E&M-EST. PATIENT-LVL II: ICD-10-PCS | Mod: PBBFAC,,, | Performed by: NURSE PRACTITIONER

## 2020-05-25 PROCEDURE — 99999 PR PBB SHADOW E&M-EST. PATIENT-LVL II: CPT | Mod: PBBFAC,,, | Performed by: NURSE PRACTITIONER

## 2020-05-25 PROCEDURE — 99203 PR OFFICE/OUTPT VISIT, NEW, LEVL III, 30-44 MIN: ICD-10-PCS | Mod: 25,S$GLB,, | Performed by: NURSE PRACTITIONER

## 2020-05-25 PROCEDURE — 99203 OFFICE O/P NEW LOW 30 MIN: CPT | Mod: 25,S$GLB,, | Performed by: NURSE PRACTITIONER

## 2020-05-25 RX ORDER — LORATADINE 10 MG/1
10 TABLET ORAL DAILY PRN
Qty: 90 TABLET | Refills: 3 | Status: SHIPPED | OUTPATIENT
Start: 2020-05-25 | End: 2020-05-26 | Stop reason: ALTCHOICE

## 2020-05-25 RX ORDER — FLUTICASONE PROPIONATE 50 MCG
2 SPRAY, SUSPENSION (ML) NASAL DAILY
Qty: 16 G | Refills: 2 | Status: SHIPPED | OUTPATIENT
Start: 2020-05-25 | End: 2020-09-03

## 2020-05-25 NOTE — PROCEDURES
Ear Cerumen Removal  Date/Time: 5/25/2020 3:30 PM  Performed by: Sonya Florentino NP  Authorized by: Sonya Florentino NP     Location details:  Both ears  Procedure type: curette    Cerumen  Removal Results:  Cerumen completely removed  Patient tolerance:  Patient tolerated the procedure well with no immediate complications     Procedure Note:    The patient was brought to the minor procedure room and placed under the operating microscope of the left ear canal which was cleaned of ceruminous debris. Using a combination of suction, curettes and cup forceps the patient's cerumen impaction was removed. The tympanic membrane was evaluated and was unremarkable. The patient tolerated the procedure well. There were no complications.  Procedure Note:    Patient was brought to the minor procedure room and using the operating microscope of the right ear canal which was cleaned of ceruminous debris. There was a significant cerumen impaction.  Using a combination of suction, curettes and cup forceps the patient's cerumen impaction was removed. Tympanic membrane intact. Pt tolerated well. There were no complications.

## 2020-05-25 NOTE — PROGRESS NOTES
Subjective:      Val Horvath is a 59 y.o. female who was referred to me by Dr. India Camacho in consultation for nasal congestion.    Ms. Horvath reports nasal congestion with associated runny nose, itchy nose and eyes, and sneezing the comes and goes throughout the years. She has tried OTC tylenol and sinus medications without benefit. She denies use of any topical nasal steroids or antihistamines. She denies sinus pressure or fever.     Current sinonasal medications as above.  She does not regularly use nasal decongestant sprays.    She does not recall previously having allergy testing.    She denies a history of asthma.    She relates a history of reflux symptoms which is currently managed with omeprazole 20mg daily.  She has not previously had an EGD.    She denies have a diagnosis of obstructive sleep apnea.     She has not had sinonasal surgery.    She does not recall a prior history of nasal trauma.        Past Medical History  She has a past medical history of Anxiety, AR (allergic rhinitis), Diverticulosis, Family history of breast cancer in sister, Genital herpes, Hiatal hernia, Meningioma, and Post herpetic neuralgia.    Past Surgical History  She has a past surgical history that includes Oophorectomy (Right, 1970s) and Carpal tunnel release (Right, 1990s).    Family History  Her family history includes Bladder Cancer in her father; Breast cancer (age of onset: 45) in her sister; Heart disease (age of onset: 65) in her mother; Hypertension in her mother; Kidney disease in her mother; No Known Problems in her brother.    Social History  She reports that she has never smoked. She has never used smokeless tobacco. She reports that she drinks alcohol. She reports that she does not use drugs.    Allergies  She is allergic to codeine and sulfa (sulfonamide antibiotics).    Medications   She has a current medication list which includes the following prescription(s): alprazolam, cyclobenzaprine,  gabapentin, loratadine, naproxen, omeprazole, tramadol, valacyclovir, and fluticasone propionate.    Review of Systems  Review of Systems   Constitutional: Negative for chills, fatigue and fever.   HENT: Positive for congestion, rhinorrhea and sneezing. Negative for ear pain, facial swelling, nosebleeds, postnasal drip, sinus pressure, sinus pain, sore throat and tinnitus.         Ear fullness   Eyes: Positive for itching. Negative for photophobia, redness and visual disturbance.   Respiratory: Negative for apnea, cough, shortness of breath, wheezing and stridor.    Cardiovascular: Negative for chest pain and palpitations.   Gastrointestinal: Negative for diarrhea, nausea and vomiting.   Endocrine: Negative.    Genitourinary: Negative for decreased urine volume, dysuria and frequency.   Musculoskeletal: Negative for arthralgias, myalgias and neck stiffness.   Skin: Negative for rash and wound.   Allergic/Immunologic: Positive for environmental allergies. Negative for food allergies and immunocompromised state.   Neurological: Negative for dizziness, syncope, weakness, light-headedness and headaches.   Hematological: Negative for adenopathy. Does not bruise/bleed easily.   Psychiatric/Behavioral: Negative for confusion, decreased concentration and sleep disturbance.          Objective:     LMP 01/01/2000 (Within Months)        Constitutional:   She is oriented to person, place, and time. Vital signs are normal. She appears well-developed and well-nourished. She appears alert. Normal speech.      Head:  Normocephalic and atraumatic.     Ears:    Right Ear: No lacerations. No drainage, swelling or tenderness. No foreign bodies. No mastoid tenderness. Tympanic membrane is not injected, not scarred, not perforated, not erythematous, not retracted and not bulging. Tympanic membrane mobility is normal. No middle ear effusion. No hemotympanum.   Left Ear: No lacerations. No drainage, swelling or tenderness. No foreign  bodies. No mastoid tenderness. Tympanic membrane is not injected, not scarred, not perforated, not erythematous, not retracted and not bulging. Tympanic membrane mobility is normal.  No middle ear effusion. No hemotympanum.   Ears:      Nose:  Mucosal edema present. No rhinorrhea, nose lacerations, sinus tenderness, septal deviation, nasal septal hematoma or polyps. No epistaxis.  No foreign bodies. No turbinate hypertrophy.  Right sinus exhibits no maxillary sinus tenderness and no frontal sinus tenderness. Left sinus exhibits no maxillary sinus tenderness and no frontal sinus tenderness.     Mouth/Throat  Oropharynx clear and moist without lesions or asymmetry, normal uvula midline and lips, teeth, and gums normal. No uvula swelling, oral lesions, trismus, mucous membrane lesions or xerostomia. No oropharyngeal exudate, posterior oropharyngeal edema or posterior oropharyngeal erythema.   Tonsils 1+ bilaterally      Neck:  Neck normal without thyromegaly masses, asymmetry, normal tracheal structure, crepitus, and tenderness and no adenopathy.     Psychiatric:   She has a normal mood and affect. Her speech is normal and behavior is normal.     Neurological:   She is alert and oriented to person, place, and time. No cranial nerve deficit.     Skin:   No abrasions, lacerations, lesions, or rashes.       Procedure    Cerumen removal performed.  See procedure note.      Data Reviewed    WBC (Thousand/uL)   Date Value   05/23/2020 5.7     Eosinophil% (%)   Date Value   05/23/2020 1.4     Eos # (cells/uL)   Date Value   05/23/2020 80     Platelets (Thousand/uL)   Date Value   05/23/2020 270     Glucose (mg/dL)   Date Value   05/23/2020 79     No results found for: IGE    No sinus imaging available.       Assessment:     1. Allergic rhinitis, unspecified seasonality, unspecified trigger    2. Bilateral impacted cerumen         Plan:     I had a long discussion with the patient regarding her condition and the further workup  and management options.    Val was seen today for cerumen impaction, sinusitis and sinus problem.    Diagnoses and all orders for this visit:    Allergic rhinitis, unspecified seasonality, unspecified trigger  -     fluticasone propionate (FLONASE) 50 mcg/actuation nasal spray; 2 sprays (100 mcg total) by Each Nostril route once daily.  -     loratadine (CLARITIN) 10 mg tablet; Take 1 tablet (10 mg total) by mouth daily as needed for Allergies.    Bilateral impacted cerumen  -     Ambulatory referral/consult to ENT  -     Ear Cerumen Removal      Follow up if symptoms worsen or fail to improve.

## 2020-05-25 NOTE — TELEPHONE ENCOUNTER
----- Message from India Camacho MD sent at 5/25/2020  8:02 AM CDT -----  Please notify pt that: electrolytes, kidney and liver function are normal.  Blood count is normal   Thyroid is normal   Vitamin D is normal   Cholesterol panel is normal   Urine is normal

## 2020-05-25 NOTE — LETTER
May 25, 2020      India Camacho MD  [address]  [place] [address]           [facility] - Otorhinolaryngology  [address]  [place] [address]  Phone: [phone]  Fax: [phone]          Patient: Val Horvath   MR Number: [id number]   YOB: 1960   Date of Visit: 5/25/2020       Dear Dr. India Camacho:    Thank you for referring Val Horvath to me for evaluation. Attached you will find relevant portions of my assessment and plan of care.    If you have questions, please do not hesitate to call me. I look forward to following aVl Horvath along with you.    Sincerely,    Sonya Florentino, NP    Enclosure  CC:  No Recipients    If you would like to receive this communication electronically, please contact [email or web address] or [phone] to request more information on [employer] Link access.    For providers and/or their staff who would like to refer a patient to [facility], please contact us through our one-stop-shop provider referral line, [facility], at [phone].    If you feel you have received this communication in error or would no longer like to receive these types of communications, please e-mail [email or web address]

## 2020-05-25 NOTE — TELEPHONE ENCOUNTER
The patient called and a message left on the voicemail to call the office back regarding test results.

## 2020-05-25 NOTE — TELEPHONE ENCOUNTER
----- Message from Aurea Wiggins sent at 5/25/2020  4:04 PM CDT -----  Contact: [phone]  Type: Returning a call    Who left a message?  Mariposa      When did the practice call?   today    Comments: Regarding test results.  Please advise, thank you.

## 2020-05-26 ENCOUNTER — TELEPHONE (OUTPATIENT)
Dept: INTERNAL MEDICINE | Facility: CLINIC | Age: 60
End: 2020-05-26

## 2020-05-26 DIAGNOSIS — J30.9 ALLERGIC RHINITIS, UNSPECIFIED SEASONALITY, UNSPECIFIED TRIGGER: Primary | ICD-10-CM

## 2020-05-26 LAB
25(OH)D3 SERPL-MCNC: 34 NG/ML (ref 30–100)
ALBUMIN SERPL-MCNC: 4.2 G/DL (ref 3.6–5.1)
ALBUMIN/GLOB SERPL: 1.7 (CALC) (ref 1–2.5)
ALP SERPL-CCNC: 74 U/L (ref 37–153)
ALT SERPL-CCNC: 19 U/L (ref 6–29)
APPEARANCE UR: CLEAR
AST SERPL-CCNC: 26 U/L (ref 10–35)
BASOPHILS # BLD AUTO: 63 CELLS/UL (ref 0–200)
BASOPHILS NFR BLD AUTO: 1.1 %
BILIRUB SERPL-MCNC: 0.5 MG/DL (ref 0.2–1.2)
BILIRUB UR QL STRIP: NEGATIVE
BUN SERPL-MCNC: 16 MG/DL (ref 7–25)
BUN/CREAT SERPL: NORMAL (CALC) (ref 6–22)
CALCIUM SERPL-MCNC: 9.4 MG/DL (ref 8.6–10.4)
CHLORIDE SERPL-SCNC: 108 MMOL/L (ref 98–110)
CHOLEST SERPL-MCNC: 178 MG/DL
CHOLEST/HDLC SERPL: 2.5 (CALC)
CO2 SERPL-SCNC: 27 MMOL/L (ref 20–32)
COLOR UR: YELLOW
CREAT SERPL-MCNC: 0.77 MG/DL (ref 0.5–1.05)
EOSINOPHIL # BLD AUTO: 80 CELLS/UL (ref 15–500)
EOSINOPHIL NFR BLD AUTO: 1.4 %
ERYTHROCYTE [DISTWIDTH] IN BLOOD BY AUTOMATED COUNT: 13.5 % (ref 11–15)
GFRSERPLBLD MDRD-ARVRAT: 85 ML/MIN/1.73M2
GLOBULIN SER CALC-MCNC: 2.5 G/DL (CALC) (ref 1.9–3.7)
GLUCOSE SERPL-MCNC: 79 MG/DL (ref 65–99)
GLUCOSE UR QL STRIP: NEGATIVE
HBA1C MFR BLD: 5.6 % OF TOTAL HGB
HCT VFR BLD AUTO: 38.9 % (ref 35–45)
HDLC SERPL-MCNC: 71 MG/DL
HGB BLD-MCNC: 12.8 G/DL (ref 11.7–15.5)
HGB UR QL STRIP: NEGATIVE
KETONES UR QL STRIP: NEGATIVE
LDLC SERPL CALC-MCNC: 91 MG/DL (CALC)
LEUKOCYTE ESTERASE UR QL STRIP: ABNORMAL
LYMPHOCYTES # BLD AUTO: 1243 CELLS/UL (ref 850–3900)
LYMPHOCYTES NFR BLD AUTO: 21.8 %
MCH RBC QN AUTO: 28.7 PG (ref 27–33)
MCHC RBC AUTO-ENTMCNC: 32.9 G/DL (ref 32–36)
MCV RBC AUTO: 87.2 FL (ref 80–100)
MONOCYTES # BLD AUTO: 342 CELLS/UL (ref 200–950)
MONOCYTES NFR BLD AUTO: 6 %
NEUTROPHILS # BLD AUTO: 3973 CELLS/UL (ref 1500–7800)
NEUTROPHILS NFR BLD AUTO: 69.7 %
NITRITE UR QL STRIP: NEGATIVE
NONHDLC SERPL-MCNC: 107 MG/DL (CALC)
PH UR STRIP: 5.5 [PH] (ref 5–8)
PLATELET # BLD AUTO: 270 THOUSAND/UL (ref 140–400)
PMV BLD REES-ECKER: 10.6 FL (ref 7.5–12.5)
POTASSIUM SERPL-SCNC: 4.2 MMOL/L (ref 3.5–5.3)
PROT SERPL-MCNC: 6.7 G/DL (ref 6.1–8.1)
PROT UR QL STRIP: NEGATIVE
RBC # BLD AUTO: 4.46 MILLION/UL (ref 3.8–5.1)
SODIUM SERPL-SCNC: 143 MMOL/L (ref 135–146)
SP GR UR STRIP: 1.02 (ref 1–1.03)
TRIGL SERPL-MCNC: 69 MG/DL
TSH SERPL-ACNC: 2.17 MIU/L (ref 0.4–4.5)
WBC # BLD AUTO: 5.7 THOUSAND/UL (ref 3.8–10.8)

## 2020-05-26 RX ORDER — CETIRIZINE HYDROCHLORIDE 10 MG/1
10 TABLET ORAL DAILY
Qty: 30 TABLET | Refills: 2 | Status: SHIPPED | OUTPATIENT
Start: 2020-05-26 | End: 2021-05-28 | Stop reason: SDUPTHER

## 2020-05-26 NOTE — TELEPHONE ENCOUNTER
----- Message from India Camacho MD sent at 5/26/2020 12:19 PM CDT -----  Please notify pt that A1c is normal

## 2020-06-22 ENCOUNTER — OFFICE VISIT (OUTPATIENT)
Dept: DERMATOLOGY | Facility: CLINIC | Age: 60
End: 2020-06-22
Payer: COMMERCIAL

## 2020-06-22 VITALS — BODY MASS INDEX: 29.1 KG/M2 | WEIGHT: 149 LBS

## 2020-06-22 DIAGNOSIS — L82.1 SEBORRHEIC KERATOSES: ICD-10-CM

## 2020-06-22 DIAGNOSIS — D22.9 SKIN MOLE: ICD-10-CM

## 2020-06-22 DIAGNOSIS — L81.8 IDIOPATHIC GUTTATE HYPOMELANOSIS: Primary | ICD-10-CM

## 2020-06-22 DIAGNOSIS — D18.01 CHERRY ANGIOMA: ICD-10-CM

## 2020-06-22 DIAGNOSIS — R20.9 DISTURBANCE OF SKIN SENSATION: ICD-10-CM

## 2020-06-22 PROCEDURE — 99202 OFFICE O/P NEW SF 15 MIN: CPT | Mod: 25,S$GLB,, | Performed by: DERMATOLOGY

## 2020-06-22 PROCEDURE — 17110 PR DESTRUCTION BENIGN LESIONS UP TO 14: ICD-10-PCS | Mod: S$GLB,,, | Performed by: DERMATOLOGY

## 2020-06-22 PROCEDURE — 99999 PR PBB SHADOW E&M-EST. PATIENT-LVL III: ICD-10-PCS | Mod: PBBFAC,,, | Performed by: DERMATOLOGY

## 2020-06-22 PROCEDURE — 99202 PR OFFICE/OUTPT VISIT, NEW, LEVL II, 15-29 MIN: ICD-10-PCS | Mod: 25,S$GLB,, | Performed by: DERMATOLOGY

## 2020-06-22 PROCEDURE — 3008F BODY MASS INDEX DOCD: CPT | Mod: CPTII,S$GLB,, | Performed by: DERMATOLOGY

## 2020-06-22 PROCEDURE — 99999 PR PBB SHADOW E&M-EST. PATIENT-LVL III: CPT | Mod: PBBFAC,,, | Performed by: DERMATOLOGY

## 2020-06-22 PROCEDURE — 3008F PR BODY MASS INDEX (BMI) DOCUMENTED: ICD-10-PCS | Mod: CPTII,S$GLB,, | Performed by: DERMATOLOGY

## 2020-06-22 PROCEDURE — 17110 DESTRUCTION B9 LES UP TO 14: CPT | Mod: S$GLB,,, | Performed by: DERMATOLOGY

## 2020-06-22 RX ORDER — MOMETASONE FUROATE 1 MG/G
CREAM TOPICAL
Qty: 50 G | Refills: 3 | Status: SHIPPED | OUTPATIENT
Start: 2020-06-22 | End: 2022-03-29

## 2020-06-22 NOTE — PROGRESS NOTES
Subjective:       Patient ID:  Val Horvath is a 59 y.o. female who presents for   Chief Complaint   Patient presents with    Mole     back, right abdomen, raised     Has a spot on her right abdomen which is irritated no tx.  Also had a lesion under her bra over a week ago which has resolved, and noticing light spots on arms and legs.       Review of Systems   Constitutional: Negative for fever, chills, weight loss, weight gain, fatigue, night sweats and malaise.   Skin: Negative for itching, daily sunscreen use, activity-related sunscreen use and wears hat.   Hematologic/Lymphatic: Bruises/bleeds easily.        Objective:    Physical Exam   Constitutional: She appears well-developed and well-nourished. No distress.   Neurological: She is alert and oriented to person, place, and time. She is not disoriented.   Psychiatric: She has a normal mood and affect.   Skin:   Areas Examined (abnormalities noted in diagram):   Head / Face Inspection Performed  Neck Inspection Performed  Chest / Axilla Inspection Performed  Back Inspection Performed  RUE Inspected  LUE Inspection Performed              Diagram Legend     Erythematous scaling macule/papule c/w actinic keratosis       Vascular papule c/w angioma      Pigmented verrucoid papule/plaque c/w seborrheic keratosis      Yellow umbilicated papule c/w sebaceous hyperplasia      Irregularly shaped tan macule c/w lentigo     1-2 mm smooth white papules consistent with Milia      Movable subcutaneous cyst with punctum c/w epidermal inclusion cyst      Subcutaneous movable cyst c/w pilar cyst      Firm pink to brown papule c/w dermatofibroma      Pedunculated fleshy papule(s) c/w skin tag(s)      Evenly pigmented macule c/w junctional nevus     Mildly variegated pigmented, slightly irregular-bordered macule c/w mildly atypical nevus      Flesh colored to evenly pigmented papule c/w intradermal nevus       Pink pearly papule/plaque c/w basal cell carcinoma       Erythematous hyperkeratotic cursted plaque c/w SCC      Surgical scar with no sign of skin cancer recurrence      Open and closed comedones      Inflammatory papules and pustules      Verrucoid papule consistent consistent with wart     Erythematous eczematous patches and plaques     Dystrophic onycholytic nail with subungual debris c/w onychomycosis     Umbilicated papule    Erythematous-base heme-crusted tan verrucoid plaque consistent with inflamed seborrheic keratosis     Erythematous Silvery Scaling Plaque c/w Psoriasis     See annotation      Assessment / Plan:        Idiopathic guttate hypomelanosis  -     mometasone 0.1% (ELOCON) 0.1 % cream; Use daily  Dispense: 50 g; Refill: 3, explained difficult to treat may not resolve    Skin mole  -     Ambulatory referral/consult to Dermatology  Disturbance of skin sensation/Seborrheic keratoses  Brochure provided  Cryosurgery procedure note:    Verbal consent from the patient is obtained including, but not limited to, risk of hypopigmentation/hyperpigmentation, recurrence of lesion. Liquid nitrogen cryosurgery is applied to 1 lesion to produce a freeze injury. .      Cherry angioma  reassurance               Follow up in about 1 year (around 6/22/2021).

## 2020-06-22 NOTE — LETTER
June 22, 2020      India Camacho MD  [address] [place] [address]           [facility] [facility] - Dermatology  [address]  [place] [address]  Phone: [phone]  Fax: [phone]          Patient: Val Horvath   MR Number: [id number]   YOB: 1960   Date of Visit: 6/22/2020       Dear Dr. India Camacho:    Thank you for referring Val Horvath to me for evaluation. Attached you will find relevant portions of my assessment and plan of care.    If you have questions, please do not hesitate to call me. I look forward to following Val Horvath along with you.    Sincerely,    Kimberly Noriega MD    Enclosure  CC:  No Recipients    If you would like to receive this communication electronically, please contact [email or web address] or [phone] to request more information on [employer] [facility] [employer] Link access.    For providers and/or their staff who would like to refer a patient to [facility], please contact us through our one-stop-shop provider referral line, [facility], at [phone].    If you feel you have received this communication in error or would no longer like to receive these types of communications, please e-mail [email or web address]

## 2020-07-06 ENCOUNTER — TELEPHONE (OUTPATIENT)
Dept: GASTROENTEROLOGY | Facility: CLINIC | Age: 60
End: 2020-07-06

## 2020-07-06 NOTE — TELEPHONE ENCOUNTER
MA spoke with patient.     Ms. Horvath is calling for our endoscopy schedulers number. Phone number was provided to our endoscopy schedulers and the [employer] office.     All questions answered to patient's satisfaction.     Mrs. Horvath would like for Dr Davenport to recommend a natural supplement, for example a tea she can take. She doesn't want/like to use dulcolax anymore.

## 2020-07-06 NOTE — TELEPHONE ENCOUNTER
----- Message from Pippa Landeros sent at 7/6/2020  9:40 AM CDT -----  Regarding: speak with nurse  Contact: patient  [phone]-please call above patient need to speak with the nurse concerning seeing the doctor again waiting on a call back from the nurse thanks.

## 2020-07-07 RX ORDER — ALPRAZOLAM 0.25 MG/1
0.25 TABLET ORAL 2 TIMES DAILY PRN
Qty: 60 TABLET | Refills: 2 | Status: SHIPPED | OUTPATIENT
Start: 2020-07-07 | End: 2020-10-06 | Stop reason: SDUPTHER

## 2020-07-07 NOTE — TELEPHONE ENCOUNTER
----- Message from Melisa Jain sent at 7/7/2020 10:10 AM CDT -----  Contact: self [phone]  Type: Rx    Name of medication(s): ALPRAZolam (XANAX) 0.25 MG tablet    Is this a refill? New rx? refill    Who prescribed medication? Dr Camacho    Pharmacy Name, Phone, & Location: [id number]     Comments: pt is requesting above refill, stated she reached out to [facility] and was told to call her PCP for a new refill

## 2020-07-14 ENCOUNTER — TELEPHONE (OUTPATIENT)
Dept: DERMATOLOGY | Facility: CLINIC | Age: 60
End: 2020-07-14

## 2020-07-14 RX ORDER — MUPIROCIN 20 MG/G
OINTMENT TOPICAL
Qty: 30 G | Refills: 3 | Status: SHIPPED | OUTPATIENT
Start: 2020-07-14 | End: 2022-03-29

## 2020-07-14 NOTE — TELEPHONE ENCOUNTER
----- Message from Kimberly Noriega MD sent at 7/14/2020  4:49 PM CDT -----  Regarding: RE: pt  Rx for bactroban sent in for her  ----- Message -----  From: Pippa Soto MA  Sent: 7/14/2020   3:23 PM CDT  To: Kimberly Noriega MD  Subject: FW: pt                                           Dr. Noriega Mrs. Horvath is still asking for a anti oint because she stated the area is itching and red.   ----- Message -----  From: Alice Baker  Sent: 7/14/2020  12:32 PM CDT  To: Hari SANDOVAL Staff  Subject: pt                                               MCR - pt - pt is calling to follow up on a refill for a prescription that was suppose to be called in pt said she hasn't gotten a call in yet can you please call pt at [phone].    SOCORRO

## 2020-08-06 NOTE — TELEPHONE ENCOUNTER
----- Message from India Camacho MD sent at 5/10/2019  1:39 PM CDT -----  Please notify pt:     - Electrolytes, kidney and liver function are normal  - CBC is normal/acceptable range; no signs of anemia  - TSH (thyroid) level is normal  - Vitamin D level is normal  - Cholesterol panel is elevated. Recommend low cholesterol diet and exercise   - HIV is negative  - Hemoglobin A1c is normal. No signs of diabetes. Insulin studies are normal/acceptable range  - Urine study is normal   Bi-Rhombic Flap Text: The defect edges were debeveled with a #15 scalpel blade.  Given the location of the defect and the proximity to free margins a bi-rhombic flap was deemed most appropriate.  Using a sterile surgical marker, an appropriate rhombic flap was drawn incorporating the defect. The area thus outlined was incised deep to adipose tissue with a #15 scalpel blade.  The skin margins were undermined to an appropriate distance in all directions utilizing iris scissors.

## 2020-08-11 ENCOUNTER — TELEPHONE (OUTPATIENT)
Dept: DERMATOLOGY | Facility: CLINIC | Age: 60
End: 2020-08-11

## 2020-08-11 NOTE — TELEPHONE ENCOUNTER
----- Message from Faye Robledo sent at 8/11/2020 10:05 AM CDT -----  Regarding: awaiting call  Pt has been waiting for a call back from office. Please give pt a call back at [phone]

## 2020-08-11 NOTE — TELEPHONE ENCOUNTER
----- Message from Kimberly Noriega MD sent at 8/11/2020 12:56 PM CDT -----  Regarding: RE: Ref to area treat treated on her abd in June.  Contact: [phone]  The ointment we sent in a prescription for  (bactroban) was to help the redness and itching not to make it fall off.  There is not really a good cream to make the center come off, I would recommend an appt at no charge to refreeze it or shave it off.  We can book as post op which should be no charge.   ----- Message -----  From: Pippa Soto MA  Sent: 8/10/2020   4:28 PM CDT  To: Kimberly Noriega MD  Subject: FW: Ref to area treat treated on her abd in #    Dr. Noriega  Mrs. Horvath stated she's not coming in for you to do the same thing you did before the area is pink on the top and bottom the middle look like a mole when she came the first time she doesn't want to be charge to see you again is there anything you can give her to help the area fall.  ----- Message -----  From: Kimberly Noriega MD  Sent: 8/10/2020   4:20 PM CDT  To: Pippa Soto MA  Subject: RE: Ref to area treat treated on her abd in #    It may need to be frozen again, she can come in one of the yellow spots.   ----- Message -----  From: Pippa Soto MA  Sent: 8/10/2020   2:09 PM CDT  To: Kimberly Noriega MD  Subject: FW: Ref to area treat treated on her abd in #    Dr. Noriega the Pt. Stated The ointment has not made the scab fall off in the middle of the treated area.  ----- Message -----  From: Kiah Tapia  Sent: 8/10/2020  10:08 AM CDT  To: Hari Wagner  Subject: Ref to area treat treated on her abd in June.    Aye-Pt states that when MCR put med on her abd.  Pt still  has a scab and has not totally healed.  Area in the middle is still there.  The ointment has not made the scab fall off in the middle of the treated area.  Pt thinks that she need s to be seen for this. Again.  Pt was seen in June.

## 2020-08-15 ENCOUNTER — HOSPITAL ENCOUNTER (OUTPATIENT)
Dept: RADIOLOGY | Facility: HOSPITAL | Age: 60
Discharge: HOME | End: 2020-08-15
Attending: SPECIALIST
Payer: COMMERCIAL

## 2020-08-15 DIAGNOSIS — Z12.31 VISIT FOR SCREENING MAMMOGRAM: ICD-10-CM

## 2020-08-15 PROCEDURE — 77067 MAMMO DIGITAL SCREENING BILAT WITH TOMOSYNTHESIS_CAD: ICD-10-PCS | Mod: 26,,, | Performed by: RADIOLOGY

## 2020-08-15 PROCEDURE — 77063 MAMMO DIGITAL SCREENING BILAT WITH TOMOSYNTHESIS_CAD: ICD-10-PCS | Mod: 26,,, | Performed by: RADIOLOGY

## 2020-08-15 PROCEDURE — 77067 SCR MAMMO BI INCL CAD: CPT | Mod: TC

## 2020-08-15 PROCEDURE — 77063 BREAST TOMOSYNTHESIS BI: CPT | Mod: 26,,, | Performed by: RADIOLOGY

## 2020-08-15 PROCEDURE — 77067 SCR MAMMO BI INCL CAD: CPT | Mod: 26,,, | Performed by: RADIOLOGY

## 2020-09-06 ENCOUNTER — PATIENT OUTREACH (OUTPATIENT)
Dept: ADMINISTRATIVE | Facility: OTHER | Age: 60
End: 2020-09-06

## 2020-09-09 ENCOUNTER — OFFICE VISIT (OUTPATIENT)
Dept: NEUROLOGY | Facility: CLINIC | Age: 60
End: 2020-09-09
Payer: COMMERCIAL

## 2020-09-09 VITALS
DIASTOLIC BLOOD PRESSURE: 72 MMHG | HEIGHT: 60 IN | BODY MASS INDEX: 29.82 KG/M2 | HEART RATE: 83 BPM | SYSTOLIC BLOOD PRESSURE: 95 MMHG | WEIGHT: 151.88 LBS

## 2020-09-09 DIAGNOSIS — B02.29 HZV (HERPES ZOSTER VIRUS) POST HERPETIC NEURALGIA: Primary | ICD-10-CM

## 2020-09-09 PROCEDURE — 3074F PR MOST RECENT SYSTOLIC BLOOD PRESSURE < 130 MM HG: ICD-10-PCS | Mod: CPTII,S$GLB,, | Performed by: NEUROMUSCULOSKELETAL MEDICINE & OMM

## 2020-09-09 PROCEDURE — 99213 PR OFFICE/OUTPT VISIT, EST, LEVL III, 20-29 MIN: ICD-10-PCS | Mod: S$GLB,,, | Performed by: NEUROMUSCULOSKELETAL MEDICINE & OMM

## 2020-09-09 PROCEDURE — 3074F SYST BP LT 130 MM HG: CPT | Mod: CPTII,S$GLB,, | Performed by: NEUROMUSCULOSKELETAL MEDICINE & OMM

## 2020-09-09 PROCEDURE — 3008F BODY MASS INDEX DOCD: CPT | Mod: CPTII,S$GLB,, | Performed by: NEUROMUSCULOSKELETAL MEDICINE & OMM

## 2020-09-09 PROCEDURE — 3078F DIAST BP <80 MM HG: CPT | Mod: CPTII,S$GLB,, | Performed by: NEUROMUSCULOSKELETAL MEDICINE & OMM

## 2020-09-09 PROCEDURE — 3008F PR BODY MASS INDEX (BMI) DOCUMENTED: ICD-10-PCS | Mod: CPTII,S$GLB,, | Performed by: NEUROMUSCULOSKELETAL MEDICINE & OMM

## 2020-09-09 PROCEDURE — 3078F PR MOST RECENT DIASTOLIC BLOOD PRESSURE < 80 MM HG: ICD-10-PCS | Mod: CPTII,S$GLB,, | Performed by: NEUROMUSCULOSKELETAL MEDICINE & OMM

## 2020-09-09 PROCEDURE — 99999 PR PBB SHADOW E&M-EST. PATIENT-LVL III: CPT | Mod: PBBFAC,,, | Performed by: NEUROMUSCULOSKELETAL MEDICINE & OMM

## 2020-09-09 PROCEDURE — 99213 OFFICE O/P EST LOW 20 MIN: CPT | Mod: S$GLB,,, | Performed by: NEUROMUSCULOSKELETAL MEDICINE & OMM

## 2020-09-09 PROCEDURE — 99999 PR PBB SHADOW E&M-EST. PATIENT-LVL III: ICD-10-PCS | Mod: PBBFAC,,, | Performed by: NEUROMUSCULOSKELETAL MEDICINE & OMM

## 2020-09-09 RX ORDER — LORATADINE 10 MG/1
TABLET ORAL
COMMUNITY
Start: 2020-08-10 | End: 2021-05-28

## 2020-09-09 NOTE — PROGRESS NOTES
Progress Notes         Progress Notes       This note was dictated with Modal Fluency a word recognition program. There are occasionally word recognition errors which are missed on review.  Present illness:  Patient presents for follow-up for post herpetic neuralgia.  She is presently taking gabapentin 300 mg 3 times a day as well as Xanax 0.25 mg once or twice per day.  Previous note:  9-3-19  patient presents for follow-up for her post herpetic neuralgia.  She is doing well on gabapentin 300 mg 3-4 times per day.  She typically very is a dose.  She does notice that the weather change still aggravates the condition       Previous note:  4-20-18:  Patient presents for follow-up for postherpetic neuralgia.  She is taking gabapentin 300 mg to-3 tablets per day with good relief.  Occasionally she has very little pain but sometimes the pain will be 8/10 when it does come.  We discussed altering the dose with a baseline dose of twice a day with an option to increase by 1 or 2 pills extra particularly when weather fronts come.         Patient presents for follow-up for her postherpetic neuralgia of the thoracic spine.  Pain is been quiet as long as she takes the gabapentin on a regular basis.  She does note that if she misses a dose she can tell the difference.  Previous note: 6-20-16: Patient returns for follow-up for postherpetic thoracic neuralgia.  She takes gabapentin 300 mg 3 times a day however notices the pain seems to be coming on sooner than her next  dosage.  She does note that the weather fronts also cause increased pain.     Previous note: 1-25-16: Patient continues with postherpetic thoracic neuralgia pain off and on. She has no problems sleeping at night with the tranquilizers. She takes gabapentin 300 mg 3 times a day however probably needs to increase this dosage by 1 or 2 pills per day if needed. She does complain of intermittent headaches with some photophobia associated with the  headaches.        Motor examination: Upper and Lower extremities - Normal and symmetrical;muscle tone was normal ; right-handed    Sensory examination:Upper and lower extremities - Pinprick and soft touch were normal and symmetrical. Vibration sense was normal at the toes for age 15-20 seconds    Deep tendon reflexes were 1-2+ symmetrical. Both plantar responses were flexor    Cerebellar examination upper: Normal finger to nose and rapid alternating movements    Gait: Steady with no ataxia; heel and toe walk normal    Romberg test: negative Tandem gait: Normal    Involuntary movements: Negative    Impression: Right temporal pain-muscle tenderness; Shingles with postherpetic neuralgia right T1-T3 thoracic area; headaches; Meningioma    Recommendations/plan: Continue gabapentin 300 mg 2-6 pills per day if needed.  Again  suggested avoiding taking the medicine during the day with an anxiety medicine.   Follow-up 1 year

## 2020-09-14 ENCOUNTER — TELEPHONE (OUTPATIENT)
Dept: DERMATOLOGY | Facility: CLINIC | Age: 60
End: 2020-09-14

## 2020-09-14 NOTE — TELEPHONE ENCOUNTER
----- Message from Alice Baker sent at 9/14/2020  8:16 AM CDT -----  Regarding: pt  MCR - pt- pt is calling to speak with the nurse to reschedule her appt for tomorrow  due to the tropical weather can you please call pt at [phone].    SOCORRO

## 2020-09-16 ENCOUNTER — TELEPHONE (OUTPATIENT)
Dept: GASTROENTEROLOGY | Facility: CLINIC | Age: 60
End: 2020-09-16

## 2020-09-16 DIAGNOSIS — Z12.11 COLON CANCER SCREENING: Primary | ICD-10-CM

## 2020-09-16 NOTE — TELEPHONE ENCOUNTER
"----- Message from Tamara Yeager MA sent at 9/16/2020 12:15 PM CDT -----  Contact: [phone]  pt said she called last week about getting order to have a colonscopy scheduled for [facility].  Pt said he call was not returned and no orders placed as of yet. Pt said "if her call is not returned today, she is calling Dr Davenport"     [phone]    "

## 2020-09-17 ENCOUNTER — TELEPHONE (OUTPATIENT)
Dept: GASTROENTEROLOGY | Facility: CLINIC | Age: 60
End: 2020-09-17

## 2020-09-17 DIAGNOSIS — Z12.11 SPECIAL SCREENING FOR MALIGNANT NEOPLASMS, COLON: Primary | ICD-10-CM

## 2020-09-17 DIAGNOSIS — Z01.818 PRE-OP TESTING: ICD-10-CM

## 2020-09-17 RX ORDER — SODIUM, POTASSIUM,MAG SULFATES 17.5-3.13G
1 SOLUTION, RECONSTITUTED, ORAL ORAL DAILY
Qty: 1 KIT | Refills: 0 | Status: SHIPPED | OUTPATIENT
Start: 2020-09-17 | End: 2020-09-19

## 2020-09-17 NOTE — TELEPHONE ENCOUNTER
----- Message from Colette Boyce sent at 9/17/2020 10:02 AM CDT -----  Regarding: Need to get colonoscopy scheduled  Contact: Val  Type:  Needs Medical Advice    Who Called: Val  Would the patient rather a call back or a response via [facility]? callback  Best Call Back Number: [phone]  Additional Information: Requesting a callback from office need to get a colonoscopy scheduled pt says she called the number and no one answered

## 2020-10-06 RX ORDER — ALPRAZOLAM 0.25 MG/1
0.25 TABLET ORAL 2 TIMES DAILY PRN
Qty: 60 TABLET | Refills: 2 | Status: SHIPPED | OUTPATIENT
Start: 2020-10-06 | End: 2020-12-01 | Stop reason: SDUPTHER

## 2020-10-06 NOTE — TELEPHONE ENCOUNTER
----- Message from Jenifer Patel sent at 10/6/2020 10:02 AM CDT -----  Regarding: Pt self Mobile/Home [phone]  Requesting an RX refill or new RX.  Is this a refill or new RX:  Refill  RX name and strength: ALPRAZolam (XANAX) 0.25 MG tablet ()  Directions (copy/paste from chart):  N/A  Is this a 30 day or 90 day RX:  N/A  Local pharmacy or mail order pharmacy:  [facility] #[id number] [address] AT [facility]  Pharmacy name and phone #   [phone] Fax# [phone]   Comments:  Patient said that she usually get three refills.

## 2020-10-22 ENCOUNTER — PATIENT OUTREACH (OUTPATIENT)
Dept: ADMINISTRATIVE | Facility: OTHER | Age: 60
End: 2020-10-22

## 2020-10-22 NOTE — PROGRESS NOTES
Care Everywhere: updated  Immunization: updated(delay in links)   Health Maintenance: updated  Media Review:   Legacy Review:   Order placed:   Upcoming appts:colonoscopy 12/2/2020

## 2020-10-23 ENCOUNTER — CLINICAL SUPPORT (OUTPATIENT)
Dept: DERMATOLOGY | Facility: CLINIC | Age: 60
End: 2020-10-23
Payer: COMMERCIAL

## 2020-10-23 VITALS — WEIGHT: 151 LBS | BODY MASS INDEX: 29.49 KG/M2

## 2020-10-23 DIAGNOSIS — L82.1 SK (SEBORRHEIC KERATOSIS): Primary | ICD-10-CM

## 2020-10-23 PROCEDURE — 17110 DESTRUCTION B9 LES UP TO 14: CPT | Mod: S$GLB,,, | Performed by: DERMATOLOGY

## 2020-10-23 PROCEDURE — 99499 NO LOS: ICD-10-PCS | Mod: S$GLB,,, | Performed by: DERMATOLOGY

## 2020-10-23 PROCEDURE — 99999 PR PBB SHADOW E&M-EST. PATIENT-LVL III: ICD-10-PCS | Mod: PBBFAC,,,

## 2020-10-23 PROCEDURE — 99999 PR PBB SHADOW E&M-EST. PATIENT-LVL III: CPT | Mod: PBBFAC,,,

## 2020-10-23 PROCEDURE — 99499 UNLISTED E&M SERVICE: CPT | Mod: S$GLB,,, | Performed by: DERMATOLOGY

## 2020-10-23 PROCEDURE — 17110 PR DESTRUCTION BENIGN LESIONS UP TO 14: ICD-10-PCS | Mod: S$GLB,,, | Performed by: DERMATOLOGY

## 2020-10-23 NOTE — PROGRESS NOTES
Subjective:       Patient ID:  Val Horvath is a 60 y.o. female who presents for   Chief Complaint   Patient presents with    Lesion     recurrent right abdomen     The seborrhiec keratosis has recurred on right abdomen        Review of Systems   Constitutional: Negative for fever, chills, weight loss, weight gain, fatigue, night sweats and malaise.   Skin: Negative for daily sunscreen use, activity-related sunscreen use and wears hat.   Hematologic/Lymphatic: Does not bruise/bleed easily.        Objective:    Physical Exam   Constitutional: She appears well-developed and well-nourished.   Neurological: She is alert and oriented to person, place, and time.   Psychiatric: She has a normal mood and affect.   Skin:   Areas Examined (abnormalities noted in diagram):   Abdomen Inspection Performed              Diagram Legend     Erythematous scaling macule/papule c/w actinic keratosis       Vascular papule c/w angioma      Pigmented verrucoid papule/plaque c/w seborrheic keratosis      Yellow umbilicated papule c/w sebaceous hyperplasia      Irregularly shaped tan macule c/w lentigo     1-2 mm smooth white papules consistent with Milia      Movable subcutaneous cyst with punctum c/w epidermal inclusion cyst      Subcutaneous movable cyst c/w pilar cyst      Firm pink to brown papule c/w dermatofibroma      Pedunculated fleshy papule(s) c/w skin tag(s)      Evenly pigmented macule c/w junctional nevus     Mildly variegated pigmented, slightly irregular-bordered macule c/w mildly atypical nevus      Flesh colored to evenly pigmented papule c/w intradermal nevus       Pink pearly papule/plaque c/w basal cell carcinoma      Erythematous hyperkeratotic cursted plaque c/w SCC      Surgical scar with no sign of skin cancer recurrence      Open and closed comedones      Inflammatory papules and pustules      Verrucoid papule consistent consistent with wart     Erythematous eczematous patches and plaques     Dystrophic  onycholytic nail with subungual debris c/w onychomycosis     Umbilicated papule    Erythematous-base heme-crusted tan verrucoid plaque consistent with inflamed seborrheic keratosis     Erythematous Silvery Scaling Plaque c/w Psoriasis     See annotation      Assessment / Plan:        SK (seborrheic keratosis)  reassurance  Brochure provided  Cryosurgery procedure note:    Verbal consent from the patient is obtained including, but not limited to, risk of hypopigmentation/hyperpigmentation, scar, recurrence of lesion. Liquid nitrogen cryosurgery is applied to 1 lesion to produce a freeze injury, call if lesion does not completely resolve.               Follow up if symptoms worsen or fail to improve.

## 2020-11-11 RX ORDER — DICYCLOMINE HYDROCHLORIDE 10 MG/1
10 CAPSULE ORAL
Qty: 90 CAPSULE | Refills: 1 | Status: SHIPPED | OUTPATIENT
Start: 2020-11-11 | End: 2021-11-04 | Stop reason: SDUPTHER

## 2020-11-11 RX ORDER — DICYCLOMINE HYDROCHLORIDE 10 MG/1
10 CAPSULE ORAL
COMMUNITY
End: 2020-11-11 | Stop reason: SDUPTHER

## 2020-11-11 NOTE — TELEPHONE ENCOUNTER
----- Message from Smitha Hernández sent at 11/11/2020  8:30 AM CST -----  Val Horvath calling regarding Refills  (message) for #dicyclomine (BENTYL) 10 MG capsule. Call back [phone]      [id number] - [address] AT [address] [address]  Phone: [phone] Fax: [phone]

## 2020-11-18 ENCOUNTER — OFFICE VISIT (OUTPATIENT)
Dept: URGENT CARE | Facility: CLINIC | Age: 60
End: 2020-11-18
Payer: COMMERCIAL

## 2020-11-18 VITALS
SYSTOLIC BLOOD PRESSURE: 117 MMHG | OXYGEN SATURATION: 95 % | DIASTOLIC BLOOD PRESSURE: 72 MMHG | HEART RATE: 86 BPM | TEMPERATURE: 98 F

## 2020-11-18 DIAGNOSIS — H92.01 OTALGIA, RIGHT: Primary | ICD-10-CM

## 2020-11-18 PROCEDURE — 99213 OFFICE O/P EST LOW 20 MIN: CPT | Mod: S$GLB,,, | Performed by: FAMILY MEDICINE

## 2020-11-18 PROCEDURE — 99213 PR OFFICE/OUTPT VISIT, EST, LEVL III, 20-29 MIN: ICD-10-PCS | Mod: S$GLB,,, | Performed by: FAMILY MEDICINE

## 2020-11-18 RX ORDER — NEOMYCIN SULFATE, POLYMYXIN B SULFATE, HYDROCORTISONE 3.5; 10000; 1 MG/ML; [USP'U]/ML; MG/ML
3 SOLUTION/ DROPS AURICULAR (OTIC) 3 TIMES DAILY
Qty: 1 BOTTLE | Refills: 1 | Status: SHIPPED | OUTPATIENT
Start: 2020-11-18 | End: 2020-11-28

## 2020-11-19 NOTE — PATIENT INSTRUCTIONS
PLEASE READ YOUR DISCHARGE INSTRUCTIONS ENTIRELY AS IT CONTAINS IMPORTANT INFORMATION.    Please return here or go to the Emergency Department for any concerns or worsening of condition.    If you smoke, please stop smoking.    Please return or see your primary care doctor if you develop new or worsening symptoms.     Please arrange follow up with your primary medical clinic as soon as possible. You must understand that you've received an Urgent Care treatment only and that you may be released before all of your medical problems are known or treated. You, the patient, will arrange for follow up as instructed. If your symptoms worsen or fail to improve you should go to the Emergency Room.  Understanding Outer Ear Problems     Normal ear   Children often get an earache. Specific treatment may or may not be needed. It's best to check with your healthcare provider. Ear pain can be caused by a problem in the outer or middle ear. Or it can be someplace else, such as an infected throat. Usually, outer ear problems don't cause fever, but this isn't always the case. Use the checklist below to help you figure out if the problem is with the outer ear.  What are outer ear problems?  Outer ear problems occur in the area between the external part of the ear (auricle) and the eardrum. The eardrum is the thin sheet of tissue that passes sound waves between the outer and middle ear. These problems are often because of excess earwax buildup or infection.     Signs of outer ear problems  Call your child's healthcare provider if you are unsure or if your child is young. Its probably an outer ear problem if you can say yes to any of the following:  · My childs outer ear aches or feels blocked.  · The pain gets worse when I wiggle my childs ear.  · My child's outer ear is red or swollen.  · My child went swimming recently.   Date Last Reviewed: 11/1/2016  © 6146-6654 [employer] [address] [place] [address], [place]  [address]. All rights reserved. This information is not intended as a substitute for professional medical care. Always follow your healthcare professional's instructions.

## 2020-11-19 NOTE — PROGRESS NOTES
Subjective:       Patient ID: Valbob Horvath is a 60 y.o. female.    Vitals:  temperature is 98.4 °F (36.9 °C). Her blood pressure is 117/72 and her pulse is 86. Her oxygen saturation is 95%.     Chief Complaint: Otalgia      Patient presents with complaint of right ear discomfort for last 3 4 days.  Denies fever, headache, sore throat, runny nose, dizziness, nausea / vomiting.    Otalgia   There is pain in the right ear. This is a new problem. The current episode started in the past 7 days. The problem occurs constantly. The problem has been unchanged. There has been no fever. Pertinent negatives include no coughing, rash, sore throat or vomiting. She has tried ear drops for the symptoms.       Constitution: Negative for chills, sweating, fatigue and fever.   HENT: Positive for ear pain. Negative for congestion, sinus pain, sinus pressure, sore throat and voice change.    Neck: Negative for painful lymph nodes.   Eyes: Negative for eye redness.   Respiratory: Negative for chest tightness, cough, sputum production, bloody sputum, COPD, shortness of breath, stridor, wheezing and asthma.    Gastrointestinal: Negative for nausea and vomiting.   Musculoskeletal: Negative for muscle ache.   Skin: Negative for rash.   Allergic/Immunologic: Negative for seasonal allergies and asthma.   Hematologic/Lymphatic: Negative for swollen lymph nodes.       Objective:      Physical Exam   Constitutional: She is oriented to person, place, and time. She appears well-developed. She is cooperative.  Non-toxic appearance. She does not appear ill. No distress.   HENT:   Head: Normocephalic and atraumatic.   Ears:   Right Ear: Hearing, tympanic membrane and ear canal normal. impacted cerumen  Left Ear: Hearing, tympanic membrane, external ear and ear canal normal. impacted cerumen     Comments: Right ear canal,  Positive moderate swelling.   Nose: Nose normal. No mucosal edema, rhinorrhea, nasal deformity or congestion. No epistaxis.  Right sinus exhibits no maxillary sinus tenderness and no frontal sinus tenderness. Left sinus exhibits no maxillary sinus tenderness and no frontal sinus tenderness.   Mouth/Throat: Uvula is midline, oropharynx is clear and moist and mucous membranes are normal. No trismus in the jaw. Normal dentition. No uvula swelling. No posterior oropharyngeal erythema.   Eyes: Conjunctivae and lids are normal. Right eye exhibits no discharge. Left eye exhibits no discharge. No scleral icterus.   Neck: Trachea normal, normal range of motion, full passive range of motion without pain and phonation normal. Neck supple.   Cardiovascular: Normal rate, regular rhythm, normal heart sounds and normal pulses.   Pulmonary/Chest: Effort normal and breath sounds normal. No respiratory distress.   Abdominal: Soft. Normal appearance and bowel sounds are normal. She exhibits no distension, no pulsatile midline mass and no mass. There is no abdominal tenderness.   Musculoskeletal: Normal range of motion.         General: No deformity.   Neurological: She is alert and oriented to person, place, and time. She exhibits normal muscle tone. Coordination normal.   Skin: Skin is warm, dry, intact, not diaphoretic and not pale. Psychiatric: Her speech is normal and behavior is normal. Judgment and thought content normal.   Nursing note and vitals reviewed.        Assessment:       1. Otalgia, right        Plan:         Otalgia, right    Other orders  -     neomycin-polymyxin-hydrocortisone (CORTISPORIN) otic solution; Place 3 drops into the right ear 3 (three) times daily. for 10 days  Dispense: 1 Bottle; Refill: 1        PLEASE READ YOUR DISCHARGE INSTRUCTIONS ENTIRELY AS IT CONTAINS IMPORTANT INFORMATION.    Please return here or go to the Emergency Department for any concerns or worsening of condition.    If you smoke, please stop smoking.    Please return or see your primary care doctor if you develop new or worsening symptoms.     Please arrange  follow up with your primary medical clinic as soon as possible. You must understand that you've received an Urgent Care treatment only and that you may be released before all of your medical problems are known or treated. You, the patient, will arrange for follow up as instructed. If your symptoms worsen or fail to improve you should go to the Emergency Room.  Understanding Outer Ear Problems     Normal ear   Children often get an earache. Specific treatment may or may not be needed. It's best to check with your healthcare provider. Ear pain can be caused by a problem in the outer or middle ear. Or it can be someplace else, such as an infected throat. Usually, outer ear problems don't cause fever, but this isn't always the case. Use the checklist below to help you figure out if the problem is with the outer ear.  What are outer ear problems?  Outer ear problems occur in the area between the external part of the ear (auricle) and the eardrum. The eardrum is the thin sheet of tissue that passes sound waves between the outer and middle ear. These problems are often because of excess earwax buildup or infection.     Signs of outer ear problems  Call your child's healthcare provider if you are unsure or if your child is young. Its probably an outer ear problem if you can say yes to any of the following:  · My childs outer ear aches or feels blocked.  · The pain gets worse when I wiggle my childs ear.  · My child's outer ear is red or swollen.  · My child went swimming recently.   Date Last Reviewed: 11/1/2016 © 2000-2017 [employer]. [address] [place] [address], [place] [address]. All rights reserved. This information is not intended as a substitute for professional medical care. Always follow your healthcare professional's instructions.

## 2020-11-29 ENCOUNTER — LAB VISIT (OUTPATIENT)
Dept: URGENT CARE | Facility: CLINIC | Age: 60
End: 2020-11-29
Payer: COMMERCIAL

## 2020-11-29 DIAGNOSIS — Z01.818 PRE-OP TESTING: ICD-10-CM

## 2020-11-29 LAB — SARS-COV-2 RNA RESP QL NAA+PROBE: NOT DETECTED

## 2020-11-29 PROCEDURE — U0003 INFECTIOUS AGENT DETECTION BY NUCLEIC ACID (DNA OR RNA); SEVERE ACUTE RESPIRATORY SYNDROME CORONAVIRUS 2 (SARS-COV-2) (CORONAVIRUS DISEASE [COVID-19]), AMPLIFIED PROBE TECHNIQUE, MAKING USE OF HIGH THROUGHPUT TECHNOLOGIES AS DESCRIBED BY CMS-2020-01-R: HCPCS

## 2020-12-01 RX ORDER — ALPRAZOLAM 0.25 MG/1
0.25 TABLET ORAL 2 TIMES DAILY PRN
Qty: 60 TABLET | Refills: 2 | Status: SHIPPED | OUTPATIENT
Start: 2020-12-01 | End: 2021-03-15

## 2020-12-02 ENCOUNTER — ANESTHESIA EVENT (OUTPATIENT)
Dept: ENDOSCOPY | Facility: HOSPITAL | Age: 60
End: 2020-12-02
Payer: COMMERCIAL

## 2020-12-02 ENCOUNTER — HOSPITAL ENCOUNTER (OUTPATIENT)
Facility: HOSPITAL | Age: 60
Discharge: HOME | End: 2020-12-02
Attending: INTERNAL MEDICINE | Admitting: INTERNAL MEDICINE
Payer: COMMERCIAL

## 2020-12-02 ENCOUNTER — ANESTHESIA (OUTPATIENT)
Dept: ENDOSCOPY | Facility: HOSPITAL | Age: 60
End: 2020-12-02
Payer: COMMERCIAL

## 2020-12-02 VITALS
TEMPERATURE: 98 F | BODY MASS INDEX: 27.48 KG/M2 | HEIGHT: 60 IN | WEIGHT: 140 LBS | HEART RATE: 76 BPM | SYSTOLIC BLOOD PRESSURE: 133 MMHG | OXYGEN SATURATION: 100 % | RESPIRATION RATE: 16 BRPM | DIASTOLIC BLOOD PRESSURE: 72 MMHG

## 2020-12-02 DIAGNOSIS — Z12.11 COLON CANCER SCREENING: ICD-10-CM

## 2020-12-02 PROCEDURE — G0121 COLON CA SCRN NOT HI RSK IND: HCPCS | Performed by: INTERNAL MEDICINE

## 2020-12-02 PROCEDURE — E9220 PRA ENDO ANESTHESIA: HCPCS | Mod: ,,, | Performed by: NURSE ANESTHETIST, CERTIFIED REGISTERED

## 2020-12-02 PROCEDURE — 25000003 PHARM REV CODE 250: Performed by: INTERNAL MEDICINE

## 2020-12-02 PROCEDURE — E9220 PRA ENDO ANESTHESIA: ICD-10-PCS | Mod: ,,, | Performed by: NURSE ANESTHETIST, CERTIFIED REGISTERED

## 2020-12-02 PROCEDURE — G0121 COLON CA SCRN NOT HI RSK IND: ICD-10-PCS | Mod: ,,, | Performed by: INTERNAL MEDICINE

## 2020-12-02 PROCEDURE — G0121 COLON CA SCRN NOT HI RSK IND: HCPCS | Mod: ,,, | Performed by: INTERNAL MEDICINE

## 2020-12-02 PROCEDURE — 37000008 HC ANESTHESIA 1ST 15 MINUTES: Performed by: INTERNAL MEDICINE

## 2020-12-02 PROCEDURE — 63600175 PHARM REV CODE 636 W HCPCS: Performed by: NURSE ANESTHETIST, CERTIFIED REGISTERED

## 2020-12-02 PROCEDURE — 37000009 HC ANESTHESIA EA ADD 15 MINS: Performed by: INTERNAL MEDICINE

## 2020-12-02 RX ORDER — SODIUM CHLORIDE 9 MG/ML
INJECTION, SOLUTION INTRAVENOUS CONTINUOUS
Status: DISCONTINUED | OUTPATIENT
Start: 2020-12-02 | End: 2020-12-02 | Stop reason: HOSPADM

## 2020-12-02 RX ORDER — PROPOFOL 10 MG/ML
INJECTION, EMULSION INTRAVENOUS
Status: DISCONTINUED | OUTPATIENT
Start: 2020-12-02 | End: 2020-12-02

## 2020-12-02 RX ORDER — PROPOFOL 10 MG/ML
INJECTION, EMULSION INTRAVENOUS CONTINUOUS PRN
Status: DISCONTINUED | OUTPATIENT
Start: 2020-12-02 | End: 2020-12-02

## 2020-12-02 RX ORDER — LIDOCAINE HCL/PF 100 MG/5ML
SYRINGE (ML) INTRAVENOUS
Status: DISCONTINUED | OUTPATIENT
Start: 2020-12-02 | End: 2020-12-02

## 2020-12-02 RX ADMIN — SODIUM CHLORIDE: 0.9 INJECTION, SOLUTION INTRAVENOUS at 11:12

## 2020-12-02 RX ADMIN — SODIUM CHLORIDE 10 ML/HR: 0.9 INJECTION, SOLUTION INTRAVENOUS at 09:12

## 2020-12-02 RX ADMIN — Medication 60 MG: at 10:12

## 2020-12-02 RX ADMIN — PROPOFOL 150 MCG/KG/MIN: 10 INJECTION, EMULSION INTRAVENOUS at 10:12

## 2020-12-02 RX ADMIN — PROPOFOL 75 MG: 10 INJECTION, EMULSION INTRAVENOUS at 10:12

## 2020-12-02 NOTE — DISCHARGE INSTRUCTIONS

## 2020-12-02 NOTE — TRANSFER OF CARE
Anesthesia Transfer of Care Note    Patient: Val Horvath    Procedure(s) Performed: Procedure(s) (LRB):  COLONOSCOPY (N/A)    Patient location: GI    Anesthesia Type: general    Transport from OR: Transported from OR on room air with adequate spontaneous ventilation    Post pain: adequate analgesia    Post assessment: no apparent anesthetic complications and tolerated procedure well    Post vital signs: stable    Level of consciousness: awake and responds to stimulation    Nausea/Vomiting: no nausea/vomiting    Complications: none    Transfer of care protocol was followed      Last vitals:   Visit Vitals  BP (!) 136/98   Pulse 87   Temp 36.6 °C (97.9 °F)   Resp 15   Ht 5' (1.524 m)   Wt 63.5 kg (140 lb)   LMP 01/01/2000 (Within Months)   SpO2 99%   BMI 27.34 kg/m²

## 2020-12-02 NOTE — ANESTHESIA POSTPROCEDURE EVALUATION
Anesthesia Post Evaluation    Patient: Val Horvath    Procedure(s) Performed: Procedure(s) (LRB):  COLONOSCOPY (N/A)    Final Anesthesia Type: general    Patient location during evaluation: GI PACU  Patient participation: Yes- Able to Participate  Level of consciousness: awake and alert  Post-procedure vital signs: reviewed and stable  Pain management: adequate  Airway patency: patent    PONV status at discharge: No PONV  Anesthetic complications: no      Cardiovascular status: blood pressure returned to baseline  Respiratory status: unassisted  Hydration status: euvolemic  Follow-up not needed.          Vitals Value Taken Time   /72 12/02/20 1150   Temp * 12/02/20 1240   Pulse 76 12/02/20 1150   Resp 16 12/02/20 1150   SpO2 100 % 12/02/20 1150         Event Time   Out of Recovery 11:51:47         Pain/Kenneth Score: Kenneth Score: 8 (12/2/2020 11:20 AM)

## 2020-12-02 NOTE — H&P
Short Stay Endoscopy History and Physical    PCP - India Camacho MD    Procedure - Colonoscopy  ASA - per anesthesia  Mallampati - per anesthesia  History of Anesthesia problems - no  Family history Anesthesia problems - no   Plan of anesthesia - General    HPI:  This is a 60 y.o. female here for evaluation of : asymptomatic screening exam    Feels well without any complaints. No abd pain, n/v/d. No overt signs of bleeding.   Prior colonoscopy approx 10 yr ago, reportedly normal.      ROS:  Constitutional: No fevers, chills, No weight loss  CV: No chest pain  Pulm: No cough, No shortness of breath  GI: see HPI  Derm: No rash    Medical History:  has a past medical history of Anxiety, AR (allergic rhinitis) (3/10/2014), Diverticulosis (2010), Family history of breast cancer in sister, Genital herpes, Hiatal hernia (2010), Meningioma (3/10/2014), and Post herpetic neuralgia (1/21/2014).    Surgical History:  has a past surgical history that includes Oophorectomy (Right, 1970s) and Carpal tunnel release (Right, 1990s).    Family History: family history includes Bladder Cancer in her father; Breast cancer (age of onset: 45) in her sister; Cancer in her paternal uncle; Heart disease (age of onset: 65) in her mother; Hypertension in her mother; Kidney disease in her mother; No Known Problems in her brother.. Otherwise no colon cancer, inflammatory bowel disease, or GI malignancies.    Social History:  reports that she has never smoked. She has never used smokeless tobacco. She reports current alcohol use. She reports that she does not use drugs.    Review of patient's allergies indicates:   Allergen Reactions    Codeine Itching    Sulfa (sulfonamide antibiotics) Itching       Medications:   Medications Prior to Admission   Medication Sig Dispense Refill Last Dose    ALPRAZolam (XANAX) 0.25 MG tablet Take 1 tablet (0.25 mg total) by mouth 2 (two) times daily as needed for Anxiety. 60 tablet 2 Past Month at  Unknown time    cyclobenzaprine (FLEXERIL) 10 MG tablet TK 1/2 TO 1 T PO QHS PRN  1 Past Month at Unknown time    dicyclomine (BENTYL) 10 MG capsule Take 1 capsule (10 mg total) by mouth 4 (four) times daily before meals and nightly. 90 capsule 1 Past Month at Unknown time    fluticasone propionate (FLONASE) 50 mcg/actuation nasal spray SHAKE LIQUID AND USE 2 SPRAYS(100 MCG) IN EACH NOSTRIL EVERY DAY 16 g 2 12/2/2020 at Unknown time    gabapentin (NEURONTIN) 300 MG capsule TAKE UP TO 6 CAPSULES BY MOUTH EVERY  capsule 3 Past Month at Unknown time    loratadine (CLARITIN) 10 mg tablet    Past Month at Unknown time    mometasone 0.1% (ELOCON) 0.1 % cream Use daily 50 g 3 Past Month at Unknown time    mupirocin (BACTROBAN) 2 % ointment Use qd 30 g 3 Past Month at Unknown time    naproxen (NAPROSYN) 500 MG tablet TK 1 T PO  BID WITH FOOD  2 Past Month at Unknown time    omeprazole (PRILOSEC) 20 MG capsule take 1 capsule by mouth once daily BEFORE BREAKFAST, PRN 90 capsule 3 Past Month at Unknown time    traMADol (ULTRAM) 50 mg tablet TAKE 1 T BY MOUTH EVERY 6 HOURS AS NEEDED  2 Past Month at Unknown time    valACYclovir (VALTREX) 500 MG tablet Take 1 tablet (500 mg total) by mouth daily as needed. 30 tablet 3 Past Month at Unknown time    cetirizine (ZYRTEC) 10 MG tablet Take 1 tablet (10 mg total) by mouth once daily. 30 tablet 2          Physical Exam:    Vital Signs:   Vitals:    12/02/20 0925   BP: 136/60   Pulse: 78   Resp: 15   Temp: 97.9 °F (36.6 °C)       General Appearance: Well appearing in no acute distress  Eyes:    No scleral icterus  ENT: Neck supple, Lips, mucosa, and tongue normal; teeth and gums normal  Lungs: CTA bilaterally  Heart:  S1, S2 normal, no murmurs heard  Abdomen: Soft, non tender, non distended with positive bowel sounds. No hepatosplenomegaly, ascites, or mass.  Extremities: 2+ pulses, no clubbing, cyanosis or edema  Skin: No rash      Labs:  Lab Results   Component  Value Date    WBC 5.7 05/23/2020    HGB 12.8 05/23/2020    HCT 38.9 05/23/2020     05/23/2020    CHOL 178 05/23/2020    TRIG 69 05/23/2020    HDL 71 05/23/2020    ALT 19 05/23/2020    AST 26 05/23/2020     05/23/2020    K 4.2 05/23/2020     05/23/2020    CREATININE 0.77 05/23/2020    BUN 16 05/23/2020    CO2 27 05/23/2020    TSH 2.17 05/23/2020    HGBA1C 5.6 05/23/2020       I have explained the risks and benefits of endoscopy procedures to the patient including but not limited to bleeding, perforation, infection, and death.  The patient was asked if they understand and allowed to ask any further questions to their satisfaction.    Davi Houser MD

## 2020-12-02 NOTE — ANESTHESIA PREPROCEDURE EVALUATION
[facility]  Anesthesia Pre-Operative Evaluation       Patient Name: Val Horvath  YOB: 1960  MRN: [id number]  [facility]: [id number]      Code Status: No Order   Date of Procedure: 12/2/2020  Anesthesia: General Procedure: Procedure(s) (LRB):  COLONOSCOPY (N/A)  Pre-Operative Diagnosis: Colon cancer screening [Z12.11]  Proceduralist: Surgeon(s) and Role:     * Pieter Davenport MD - Primary Nurse: (Unknown)      SUBJECTIVE:   Val Horvath is a 60 y.o. female who  has a past medical history of Anxiety, AR (allergic rhinitis) (3/10/2014), Diverticulosis (2010), Family history of breast cancer in sister, Genital herpes, Hiatal hernia (2010), Meningioma (3/10/2014), and Post herpetic neuralgia (1/21/2014). No notes on file  Revised cardiac risk index (RCRI) score is 0.    she has a current medication list which includes the following long-term medication(s): alprazolam, cetirizine, gabapentin, mometasone 0.1%, omeprazole, valacyclovir, and gabapentin.   ALLERGIES:     Review of patient's allergies indicates:   Allergen Reactions    Codeine Itching    Sulfa (sulfonamide antibiotics) Itching     LDA:      Lines/Drains/Airways     None                Anesthesia Evaluation      Airway   Mallampati: III  TM distance: Normal, at least 6 cm  Neck ROM: Normal ROM  Dental    (+) In tact    Pulmonary    Cardiovascular   Exercise tolerance: good    Neuro/Psych    (+) headaches,     GI/Hepatic/Renal    (+) hiatal hernia, PUD,     Endo/Other    Abdominal                   MEDICATIONS:     Antibiotics (From admission, onward)    None        VTE Risk Mitigation (From admission, onward)    None        No current facility-administered medications for this encounter.      Current Outpatient Medications   Medication Sig Dispense Refill    ALPRAZolam (XANAX) 0.25 MG tablet Take 1 tablet (0.25 mg total) by mouth 2 (two) times daily as needed for Anxiety. 60 tablet 2     cetirizine (ZYRTEC) 10 MG tablet Take 1 tablet (10 mg total) by mouth once daily. 30 tablet 2    cyclobenzaprine (FLEXERIL) 10 MG tablet TK 1/2 TO 1 T PO QHS PRN  1    dicyclomine (BENTYL) 10 MG capsule Take 1 capsule (10 mg total) by mouth 4 (four) times daily before meals and nightly. 90 capsule 1    fluticasone propionate (FLONASE) 50 mcg/actuation nasal spray SHAKE LIQUID AND USE 2 SPRAYS(100 MCG) IN EACH NOSTRIL EVERY DAY 16 g 2    gabapentin (NEURONTIN) 300 MG capsule TAKE UP TO 6 CAPSULES BY MOUTH EVERY  capsule 3    loratadine (CLARITIN) 10 mg tablet       mometasone 0.1% (ELOCON) 0.1 % cream Use daily (Patient not taking: Reported on 11/18/2020) 50 g 3    mupirocin (BACTROBAN) 2 % ointment Use qd (Patient not taking: Reported on 11/18/2020) 30 g 3    naproxen (NAPROSYN) 500 MG tablet TK 1 T PO  BID WITH FOOD  2    omeprazole (PRILOSEC) 20 MG capsule take 1 capsule by mouth once daily BEFORE BREAKFAST, PRN 90 capsule 3    traMADol (ULTRAM) 50 mg tablet TAKE 1 T BY MOUTH EVERY 6 HOURS AS NEEDED  2    valACYclovir (VALTREX) 500 MG tablet Take 1 tablet (500 mg total) by mouth daily as needed. 30 tablet 3          History:   There are no hospital problems to display for this patient.    Surgical History:    has a past surgical history that includes Oophorectomy (Right, 1970s) and Carpal tunnel release (Right, 1990s).   Social History:    has no history on file for sexual activity.  reports that she has never smoked. She has never used smokeless tobacco. She reports current alcohol use. She reports that she does not use drugs.     OBJECTIVE:     Vital Signs (Most Recent):    Vital Signs Range (Last 24H):          There is no height or weight on file to calculate BMI.   Wt Readings from Last 4 Encounters:   10/23/20 68.5 kg (151 lb)   09/09/20 68.9 kg (151 lb 14.4 oz)   06/22/20 67.6 kg (149 lb)   05/20/20 67.7 kg (149 lb 4 oz)     Significant Labs:  Lab Results   Component Value Date    WBC 5.7  05/23/2020    HGB 12.8 05/23/2020    HCT 38.9 05/23/2020     05/23/2020     05/23/2020    K 4.2 05/23/2020     05/23/2020    CREATININE 0.77 05/23/2020    BUN 16 05/23/2020    CO2 27 05/23/2020    GLU 79 05/23/2020    CALCIUM 9.4 05/23/2020    ALKPHOS 74 05/23/2020    ALT 19 05/23/2020    AST 26 05/23/2020    ALBUMIN 4.2 05/23/2020    HGBA1C 5.6 05/23/2020     Patient's last menstrual period was 01/01/2000 (within months).  Recent Results (from the past 72 hour(s))   COVID-19 Routine Screening    Collection Time: 11/29/20 10:41 AM   Result Value Ref Range    SARS-CoV2 (COVID-19) Qualitative PCR Not Detected Not Detected     EKG:   No results found for this or any previous visit.    ASSESSMENT/PLAN:       Anesthesia Evaluation    I have reviewed the Patient Summary Reports.    I have reviewed the Nursing Notes.    I have reviewed the Medications.     Review of Systems  Anesthesia Hx:  No problems with previous Anesthesia    Hematology/Oncology:  Hematology Normal   Oncology Normal     EENT/Dental:EENT/Dental Normal   Cardiovascular:  Cardiovascular Normal Exercise tolerance: good     Pulmonary:  Pulmonary Normal    Renal/:  Renal/ Normal     Hepatic/GI:   PUD, Hiatal Hernia,    Musculoskeletal:  Musculoskeletal Normal    Neurological:   Headaches    Endocrine:  Endocrine Normal    Dermatological:  Skin Normal    Psych:   anxiety          Physical Exam  General:  Well nourished    Airway/Jaw/Neck:  Airway Findings: Mouth Opening: Normal Tongue: Normal  General Airway Assessment: Adult  Mallampati: III  Improves to II with phonation.  TM Distance: Normal, at least 6 cm  Jaw/Neck Findings:  Neck ROM: Normal ROM     Eyes/Ears/Nose:  EYES/EARS/NOSE FINDINGS: Normal   Dental:  Dental Findings: In tact   Chest/Lungs:  Chest/Lungs Findings: Clear to auscultation     Heart/Vascular:  Heart Findings: Rhythm: Regular Rhythm  Sounds: Normal  Heart murmur: negative Vascular Findings: Normal     Abdomen:  Abdomen Findings: Normal    Musculoskeletal:  Musculoskeletal Findings: Normal   Skin:  Skin Findings: Normal    Mental Status:  Mental Status Findings:  Cooperative, Alert and Oriented         Anesthesia Plan  Type of Anesthesia, risks & benefits discussed:  Anesthesia Type:  MAC, general  Patient's Preference:   Intra-op Monitoring Plan: standard ASA monitors  Intra-op Monitoring Plan Comments:   Post Op Pain Control Plan: per primary service following discharge from PACU and multimodal analgesia  Post Op Pain Control Plan Comments:   Induction:   IV  Beta Blocker:  Patient is not currently on a Beta-Blocker (No further documentation required).       Informed Consent: Patient understands risks and agrees with Anesthesia plan.  Questions answered. Anesthesia consent signed with patient.  ASA Score: 2     Day of Surgery Review of History & Physical:     H&P completed by Anesthesiologist.   Anesthesia Plan Notes: Chart reviewed, patient interviewed and examined.  The anesthetic plan was explained.  Risks, benefits, and alternatives were discussed. Questions were answered and the consent was signed.        NOLVIA Buaer M.D.         Ready For Surgery From Anesthesia Perspective.

## 2020-12-02 NOTE — [employer] PATIENT INSTRUCTIONS
Discharge Summary/Instructions after an Endoscopic Procedure  Patient Name: Val Horvath  Patient MRN: [id number]  Patient YOB: 1960 Wednesday, December 2, 2020  Pieter Davenport MD  RESTRICTIONS:  During your procedure today, you received medications for sedation.  These   medications may affect your judgment, balance and coordination.  Therefore,   for 24 hours, you have the following restrictions:   - DO NOT drive a car, operate machinery, make legal/financial decisions,   sign important papers or drink alcohol.    ACTIVITY:  Today: no heavy lifting, straining or running due to procedural   sedation/anesthesia.  The following day: return to full activity including work.  DIET:  Eat and drink normally unless instructed otherwise.     TREATMENT FOR COMMON SIDE EFFECTS:  - Mild abdominal pain, nausea, belching, bloating or excessive gas:  rest,   eat lightly and use a heating pad.  - Sore Throat: treat with throat lozenges and/or gargle with warm salt   water.  - Because air was used during the procedure, expelling large amounts of air   from your rectum or belching is normal.  - If a bowel prep was taken, you may not have a bowel movement for 1-3 days.    This is normal.  SYMPTOMS TO WATCH FOR AND REPORT TO YOUR PHYSICIAN:  1. Abdominal pain or bloating, other than gas cramps.  2. Chest pain.  3. Back pain.  4. Signs of infection such as: chills or fever occurring within 24 hours   after the procedure.  5. Rectal bleeding, which would show as bright red, maroon, or black stools.   (A tablespoon of blood from the rectum is not serious, especially if   hemorrhoids are present.)  6. Vomiting.  7. Weakness or dizziness.  GO DIRECTLY TO THE NEAREST EMERGENCY ROOM IF YOU HAVE ANY OF THE FOLLOWING:      Difficulty breathing              Chills and/or fever over 101 F   Persistent vomiting and/or vomiting blood   Severe abdominal pain   Severe chest pain   Black, tarry stools   Bleeding- more than one  tablespoon   Any other symptom or condition that you feel may need urgent attention  Your doctor recommends these additional instructions:  If any biopsies were taken, your doctors clinic will contact you in 1 to 2   weeks with any results.  - Discharge patient to home.   - Patient has a contact number available for emergencies.  The signs and   symptoms of potential delayed complications were discussed with the   patient.  Return to normal activities tomorrow.  Written discharge   instructions were provided to the patient.   - Resume previous diet.   - Continue present medications.   - Repeat colonoscopy in 10 years for screening purposes.  For questions, problems or results please call your physician - Pieter Davenport MD at Work:  [phone].  [facility], EMERGENCY ROOM PHONE NUMBER: [phone]  IF A COMPLICATION OR EMERGENCY SITUATION ARISES AND YOU ARE UNABLE TO REACH   YOUR PHYSICIAN - GO DIRECTLY TO THE EMERGENCY ROOM.  Pieter Davenport MD  12/2/2020 11:18:52 AM  This report has been verified and signed electronically.  [employer]

## 2021-01-21 RX ORDER — VALACYCLOVIR HYDROCHLORIDE 500 MG/1
500 TABLET, FILM COATED ORAL DAILY PRN
Qty: 30 TABLET | Refills: 3 | Status: SHIPPED | OUTPATIENT
Start: 2021-01-21 | End: 2021-08-05

## 2021-01-22 ENCOUNTER — CLINICAL SUPPORT (OUTPATIENT)
Dept: URGENT CARE | Facility: CLINIC | Age: 61
End: 2021-01-22
Payer: COMMERCIAL

## 2021-01-22 DIAGNOSIS — Z11.59 SCREENING FOR VIRAL DISEASE: Primary | ICD-10-CM

## 2021-01-22 LAB
CTP QC/QA: YES
SARS-COV-2 RDRP RESP QL NAA+PROBE: NEGATIVE

## 2021-01-22 PROCEDURE — U0002 COVID-19 LAB TEST NON-CDC: HCPCS | Mod: QW,S$GLB,, | Performed by: INTERNAL MEDICINE

## 2021-01-22 PROCEDURE — U0002: ICD-10-PCS | Mod: QW,S$GLB,, | Performed by: INTERNAL MEDICINE

## 2021-02-04 ENCOUNTER — TELEPHONE (OUTPATIENT)
Dept: INTERNAL MEDICINE | Facility: CLINIC | Age: 61
End: 2021-02-04

## 2021-02-19 NOTE — TELEPHONE ENCOUNTER
Returned patient's call.    Patient is calling with complaints of belching and gas. Patient is currently taking OTC gas X, nuria, and drinking Dariel Dry, but nothing is helping.     She would like to know, if Dr. Davenport can send a prescription to her pharmacy or recommend something to help relieve the gas?    Message sent to Dr. Davenport.   details… Soft, non-tender, no hepatosplenomegaly, normal bowel sounds

## 2021-03-15 RX ORDER — ALPRAZOLAM 0.25 MG/1
TABLET ORAL
Qty: 60 TABLET | Refills: 0 | Status: SHIPPED | OUTPATIENT
Start: 2021-03-15 | End: 2021-04-09 | Stop reason: SDUPTHER

## 2021-03-31 ENCOUNTER — TELEPHONE (OUTPATIENT)
Dept: INTERNAL MEDICINE | Facility: CLINIC | Age: 61
End: 2021-03-31

## 2021-03-31 ENCOUNTER — NURSE TRIAGE (OUTPATIENT)
Dept: ADMINISTRATIVE | Facility: CLINIC | Age: 61
End: 2021-03-31

## 2021-04-05 RX ORDER — NAPROXEN 500 MG/1
TABLET ORAL
Qty: 60 TABLET | Refills: 0 | Status: SHIPPED | OUTPATIENT
Start: 2021-04-05 | End: 2021-04-27 | Stop reason: SDUPTHER

## 2021-04-09 ENCOUNTER — OFFICE VISIT (OUTPATIENT)
Dept: INTERNAL MEDICINE | Facility: CLINIC | Age: 61
End: 2021-04-09
Payer: COMMERCIAL

## 2021-04-09 VITALS
WEIGHT: 155.19 LBS | SYSTOLIC BLOOD PRESSURE: 122 MMHG | HEART RATE: 87 BPM | BODY MASS INDEX: 30.47 KG/M2 | HEIGHT: 60 IN | OXYGEN SATURATION: 97 % | DIASTOLIC BLOOD PRESSURE: 70 MMHG | TEMPERATURE: 97 F

## 2021-04-09 DIAGNOSIS — E16.2 HYPOGLYCEMIA: ICD-10-CM

## 2021-04-09 DIAGNOSIS — I83.813 VARICOSE VEINS OF BILATERAL LOWER EXTREMITIES WITH PAIN: Primary | ICD-10-CM

## 2021-04-09 DIAGNOSIS — F41.9 ANXIETY: ICD-10-CM

## 2021-04-09 PROCEDURE — 99214 PR OFFICE/OUTPT VISIT, EST, LEVL IV, 30-39 MIN: ICD-10-PCS | Mod: S$GLB,,, | Performed by: HOSPITALIST

## 2021-04-09 PROCEDURE — 1125F AMNT PAIN NOTED PAIN PRSNT: CPT | Mod: S$GLB,,, | Performed by: HOSPITALIST

## 2021-04-09 PROCEDURE — 99999 PR PBB SHADOW E&M-EST. PATIENT-LVL IV: ICD-10-PCS | Mod: PBBFAC,,, | Performed by: HOSPITALIST

## 2021-04-09 PROCEDURE — 3008F PR BODY MASS INDEX (BMI) DOCUMENTED: ICD-10-PCS | Mod: CPTII,S$GLB,, | Performed by: HOSPITALIST

## 2021-04-09 PROCEDURE — 99999 PR PBB SHADOW E&M-EST. PATIENT-LVL IV: CPT | Mod: PBBFAC,,, | Performed by: HOSPITALIST

## 2021-04-09 PROCEDURE — 99214 OFFICE O/P EST MOD 30 MIN: CPT | Mod: S$GLB,,, | Performed by: HOSPITALIST

## 2021-04-09 PROCEDURE — 3008F BODY MASS INDEX DOCD: CPT | Mod: CPTII,S$GLB,, | Performed by: HOSPITALIST

## 2021-04-09 PROCEDURE — 1125F PR PAIN SEVERITY QUANTIFIED, PAIN PRESENT: ICD-10-PCS | Mod: S$GLB,,, | Performed by: HOSPITALIST

## 2021-04-09 RX ORDER — ALPRAZOLAM 0.25 MG/1
TABLET ORAL
Qty: 60 TABLET | Refills: 2 | Status: SHIPPED | OUTPATIENT
Start: 2021-04-09 | End: 2021-07-20 | Stop reason: SDUPTHER

## 2021-04-09 RX ORDER — LANCETS
EACH MISCELLANEOUS
Qty: 30 EACH | Refills: 1 | Status: SHIPPED | OUTPATIENT
Start: 2021-04-09 | End: 2021-05-28

## 2021-04-09 RX ORDER — DEXTROSE 4 G
1 TABLET,CHEWABLE ORAL DAILY PRN
Qty: 1 EACH | Refills: 0 | Status: SHIPPED | OUTPATIENT
Start: 2021-04-09 | End: 2022-03-29

## 2021-04-09 RX ORDER — FLUCONAZOLE 150 MG/1
TABLET ORAL
COMMUNITY
Start: 2021-03-23 | End: 2022-05-31

## 2021-04-21 ENCOUNTER — TELEPHONE (OUTPATIENT)
Dept: INTERNAL MEDICINE | Facility: CLINIC | Age: 61
End: 2021-04-21

## 2021-04-21 DIAGNOSIS — I83.813 VARICOSE VEINS OF BILATERAL LOWER EXTREMITIES WITH PAIN: Primary | ICD-10-CM

## 2021-04-22 ENCOUNTER — TELEPHONE (OUTPATIENT)
Dept: VASCULAR SURGERY | Facility: CLINIC | Age: 61
End: 2021-04-22

## 2021-04-23 DIAGNOSIS — M79.606 PAIN AND SWELLING OF LOWER EXTREMITY, UNSPECIFIED LATERALITY: Primary | ICD-10-CM

## 2021-04-23 DIAGNOSIS — M79.89 PAIN AND SWELLING OF LOWER EXTREMITY, UNSPECIFIED LATERALITY: Primary | ICD-10-CM

## 2021-04-27 ENCOUNTER — TELEPHONE (OUTPATIENT)
Dept: INTERNAL MEDICINE | Facility: CLINIC | Age: 61
End: 2021-04-27

## 2021-04-27 RX ORDER — NAPROXEN 500 MG/1
TABLET ORAL
Qty: 60 TABLET | Refills: 2 | Status: SHIPPED | OUTPATIENT
Start: 2021-04-27 | End: 2022-03-29

## 2021-04-27 RX ORDER — METHYLPREDNISOLONE 4 MG/1
TABLET ORAL
Qty: 1 PACKAGE | Refills: 0 | Status: SHIPPED | OUTPATIENT
Start: 2021-04-27 | End: 2021-05-18

## 2021-05-28 ENCOUNTER — TELEPHONE (OUTPATIENT)
Dept: GENETICS | Facility: CLINIC | Age: 61
End: 2021-05-28

## 2021-05-28 ENCOUNTER — OFFICE VISIT (OUTPATIENT)
Dept: INTERNAL MEDICINE | Facility: CLINIC | Age: 61
End: 2021-05-28
Payer: COMMERCIAL

## 2021-05-28 VITALS
HEART RATE: 84 BPM | SYSTOLIC BLOOD PRESSURE: 110 MMHG | RESPIRATION RATE: 16 BRPM | TEMPERATURE: 97 F | HEIGHT: 60 IN | WEIGHT: 157.63 LBS | DIASTOLIC BLOOD PRESSURE: 60 MMHG | OXYGEN SATURATION: 98 % | BODY MASS INDEX: 30.95 KG/M2

## 2021-05-28 DIAGNOSIS — Z00.00 ANNUAL PHYSICAL EXAM: Primary | ICD-10-CM

## 2021-05-28 DIAGNOSIS — J30.9 ALLERGIC RHINITIS, UNSPECIFIED SEASONALITY, UNSPECIFIED TRIGGER: ICD-10-CM

## 2021-05-28 DIAGNOSIS — Z13.71 TESTING FOR GENETIC DISEASE CARRIER STATUS: ICD-10-CM

## 2021-05-28 DIAGNOSIS — F41.0 GENERALIZED ANXIETY DISORDER WITH PANIC ATTACKS: ICD-10-CM

## 2021-05-28 DIAGNOSIS — Z12.31 ENCOUNTER FOR SCREENING MAMMOGRAM FOR BREAST CANCER: ICD-10-CM

## 2021-05-28 DIAGNOSIS — F41.1 GENERALIZED ANXIETY DISORDER WITH PANIC ATTACKS: ICD-10-CM

## 2021-05-28 DIAGNOSIS — K21.9 GASTROESOPHAGEAL REFLUX DISEASE WITHOUT ESOPHAGITIS: ICD-10-CM

## 2021-05-28 PROCEDURE — 1126F AMNT PAIN NOTED NONE PRSNT: CPT | Mod: S$GLB,,, | Performed by: HOSPITALIST

## 2021-05-28 PROCEDURE — 1126F PR PAIN SEVERITY QUANTIFIED, NO PAIN PRESENT: ICD-10-PCS | Mod: S$GLB,,, | Performed by: HOSPITALIST

## 2021-05-28 PROCEDURE — 99999 PR PBB SHADOW E&M-EST. PATIENT-LVL V: ICD-10-PCS | Mod: PBBFAC,,, | Performed by: HOSPITALIST

## 2021-05-28 PROCEDURE — 99396 PR PREVENTIVE VISIT,EST,40-64: ICD-10-PCS | Mod: S$GLB,,, | Performed by: HOSPITALIST

## 2021-05-28 PROCEDURE — 3008F BODY MASS INDEX DOCD: CPT | Mod: CPTII,S$GLB,, | Performed by: HOSPITALIST

## 2021-05-28 PROCEDURE — 99999 PR PBB SHADOW E&M-EST. PATIENT-LVL V: CPT | Mod: PBBFAC,,, | Performed by: HOSPITALIST

## 2021-05-28 PROCEDURE — 99396 PREV VISIT EST AGE 40-64: CPT | Mod: S$GLB,,, | Performed by: HOSPITALIST

## 2021-05-28 PROCEDURE — 3008F PR BODY MASS INDEX (BMI) DOCUMENTED: ICD-10-PCS | Mod: CPTII,S$GLB,, | Performed by: HOSPITALIST

## 2021-05-28 RX ORDER — FLUTICASONE PROPIONATE 50 MCG
SPRAY, SUSPENSION (ML) NASAL
Qty: 16 G | Refills: 11 | Status: SHIPPED | OUTPATIENT
Start: 2021-05-28 | End: 2022-06-21

## 2021-05-28 RX ORDER — LANCETS 33 GAUGE
EACH MISCELLANEOUS
COMMUNITY
Start: 2021-04-09 | End: 2022-07-08 | Stop reason: SDUPTHER

## 2021-05-28 RX ORDER — CETIRIZINE HYDROCHLORIDE 10 MG/1
10 TABLET ORAL DAILY
Qty: 90 TABLET | Refills: 3 | Status: SHIPPED | OUTPATIENT
Start: 2021-05-28 | End: 2022-03-30

## 2021-05-28 RX ORDER — ZOSTER VACCINE RECOMBINANT, ADJUVANTED 50 MCG/0.5
0.5 KIT INTRAMUSCULAR ONCE
Qty: 1 EACH | Refills: 1 | Status: SHIPPED | OUTPATIENT
Start: 2021-05-28 | End: 2021-05-28

## 2021-05-30 LAB
ALBUMIN SERPL-MCNC: 4.3 G/DL (ref 3.6–5.1)
ALBUMIN/GLOB SERPL: 1.7 (CALC) (ref 1–2.5)
ALP SERPL-CCNC: 76 U/L (ref 37–153)
ALT SERPL-CCNC: 11 U/L (ref 6–29)
APPEARANCE UR: CLEAR
AST SERPL-CCNC: 19 U/L (ref 10–35)
BASOPHILS # BLD AUTO: 49 CELLS/UL (ref 0–200)
BASOPHILS NFR BLD AUTO: 0.7 %
BILIRUB SERPL-MCNC: 0.4 MG/DL (ref 0.2–1.2)
BILIRUB UR QL STRIP: NEGATIVE
BUN SERPL-MCNC: 12 MG/DL (ref 7–25)
BUN/CREAT SERPL: NORMAL (CALC) (ref 6–22)
CALCIUM SERPL-MCNC: 9.2 MG/DL (ref 8.6–10.4)
CHLORIDE SERPL-SCNC: 110 MMOL/L (ref 98–110)
CHOLEST SERPL-MCNC: 190 MG/DL
CHOLEST/HDLC SERPL: 3.1 (CALC)
CO2 SERPL-SCNC: 27 MMOL/L (ref 20–32)
COLOR UR: YELLOW
CREAT SERPL-MCNC: 0.74 MG/DL (ref 0.5–0.99)
EOSINOPHIL # BLD AUTO: 49 CELLS/UL (ref 15–500)
EOSINOPHIL NFR BLD AUTO: 0.7 %
ERYTHROCYTE [DISTWIDTH] IN BLOOD BY AUTOMATED COUNT: 13.1 % (ref 11–15)
GLOBULIN SER CALC-MCNC: 2.6 G/DL (CALC) (ref 1.9–3.7)
GLUCOSE SERPL-MCNC: 76 MG/DL (ref 65–99)
GLUCOSE UR QL STRIP: NEGATIVE
HBA1C MFR BLD: 5.6 % OF TOTAL HGB
HCT VFR BLD AUTO: 40.3 % (ref 35–45)
HDLC SERPL-MCNC: 61 MG/DL
HGB BLD-MCNC: 13.1 G/DL (ref 11.7–15.5)
HGB UR QL STRIP: NEGATIVE
KETONES UR QL STRIP: NEGATIVE
LDLC SERPL CALC-MCNC: 106 MG/DL (CALC)
LEUKOCYTE ESTERASE UR QL STRIP: NEGATIVE
LYMPHOCYTES # BLD AUTO: 1351 CELLS/UL (ref 850–3900)
LYMPHOCYTES NFR BLD AUTO: 19.3 %
MCH RBC QN AUTO: 28.6 PG (ref 27–33)
MCHC RBC AUTO-ENTMCNC: 32.5 G/DL (ref 32–36)
MCV RBC AUTO: 88 FL (ref 80–100)
MONOCYTES # BLD AUTO: 329 CELLS/UL (ref 200–950)
MONOCYTES NFR BLD AUTO: 4.7 %
NEUTROPHILS # BLD AUTO: 5222 CELLS/UL (ref 1500–7800)
NEUTROPHILS NFR BLD AUTO: 74.6 %
NITRITE UR QL STRIP: NEGATIVE
NONHDLC SERPL-MCNC: 129 MG/DL (CALC)
PH UR STRIP: 5.5 [PH] (ref 5–8)
PLATELET # BLD AUTO: 268 THOUSAND/UL (ref 140–400)
PMV BLD REES-ECKER: 10.3 FL (ref 7.5–12.5)
POTASSIUM SERPL-SCNC: 4.3 MMOL/L (ref 3.5–5.3)
PROT SERPL-MCNC: 6.9 G/DL (ref 6.1–8.1)
PROT UR QL STRIP: NEGATIVE
RBC # BLD AUTO: 4.58 MILLION/UL (ref 3.8–5.1)
SODIUM SERPL-SCNC: 144 MMOL/L (ref 135–146)
SP GR UR STRIP: 1.02 (ref 1–1.03)
TRIGL SERPL-MCNC: 126 MG/DL
TSH SERPL-ACNC: 1.16 MIU/L (ref 0.4–4.5)
WBC # BLD AUTO: 7 THOUSAND/UL (ref 3.8–10.8)

## 2021-06-03 ENCOUNTER — TELEPHONE (OUTPATIENT)
Dept: INTERNAL MEDICINE | Facility: CLINIC | Age: 61
End: 2021-06-03

## 2021-06-03 ENCOUNTER — TELEPHONE (OUTPATIENT)
Dept: NEUROLOGY | Facility: CLINIC | Age: 61
End: 2021-06-03

## 2021-07-06 ENCOUNTER — TELEPHONE (OUTPATIENT)
Dept: VASCULAR SURGERY | Facility: CLINIC | Age: 61
End: 2021-07-06

## 2021-07-07 ENCOUNTER — TELEPHONE (OUTPATIENT)
Dept: VASCULAR SURGERY | Facility: CLINIC | Age: 61
End: 2021-07-07

## 2021-07-14 ENCOUNTER — TELEPHONE (OUTPATIENT)
Dept: NEUROLOGY | Facility: CLINIC | Age: 61
End: 2021-07-14

## 2021-07-14 ENCOUNTER — TELEPHONE (OUTPATIENT)
Dept: ENDOSCOPY | Facility: HOSPITAL | Age: 61
End: 2021-07-14

## 2021-07-14 DIAGNOSIS — K21.9 GASTROESOPHAGEAL REFLUX DISEASE: ICD-10-CM

## 2021-07-14 RX ORDER — OMEPRAZOLE 20 MG/1
CAPSULE, DELAYED RELEASE ORAL
Qty: 90 CAPSULE | Refills: 0 | Status: SHIPPED | OUTPATIENT
Start: 2021-07-14 | End: 2023-01-24

## 2021-07-19 DIAGNOSIS — B02.29 HZV (HERPES ZOSTER VIRUS) POST HERPETIC NEURALGIA: Primary | ICD-10-CM

## 2021-07-19 DIAGNOSIS — B02.29 POST HERPETIC NEURALGIA: ICD-10-CM

## 2021-07-20 DIAGNOSIS — B02.29 HZV (HERPES ZOSTER VIRUS) POST HERPETIC NEURALGIA: Primary | ICD-10-CM

## 2021-07-20 DIAGNOSIS — B02.29 POST HERPETIC NEURALGIA: ICD-10-CM

## 2021-07-20 RX ORDER — ALPRAZOLAM 0.25 MG/1
TABLET ORAL
Qty: 60 TABLET | Refills: 5 | Status: SHIPPED | OUTPATIENT
Start: 2021-07-20 | End: 2022-01-31

## 2021-07-21 RX ORDER — GABAPENTIN 300 MG/1
CAPSULE ORAL
Qty: 180 CAPSULE | Refills: 3 | Status: SHIPPED | OUTPATIENT
Start: 2021-07-21 | End: 2022-03-29

## 2021-07-21 RX ORDER — GABAPENTIN 300 MG/1
CAPSULE ORAL
Qty: 180 CAPSULE | Refills: 3 | Status: SHIPPED | OUTPATIENT
Start: 2021-07-21 | End: 2022-08-02 | Stop reason: SDUPTHER

## 2021-07-22 ENCOUNTER — TELEPHONE (OUTPATIENT)
Dept: INTERNAL MEDICINE | Facility: CLINIC | Age: 61
End: 2021-07-22

## 2021-08-02 ENCOUNTER — TELEPHONE (OUTPATIENT)
Dept: NEUROLOGY | Facility: CLINIC | Age: 61
End: 2021-08-02

## 2021-08-05 ENCOUNTER — TELEPHONE (OUTPATIENT)
Dept: INTERNAL MEDICINE | Facility: CLINIC | Age: 61
End: 2021-08-05

## 2021-08-05 RX ORDER — VALACYCLOVIR HYDROCHLORIDE 500 MG/1
TABLET, FILM COATED ORAL
Qty: 30 TABLET | Refills: 0 | Status: SHIPPED | OUTPATIENT
Start: 2021-08-05 | End: 2023-07-19

## 2021-08-13 ENCOUNTER — TELEPHONE (OUTPATIENT)
Dept: INTERNAL MEDICINE | Facility: CLINIC | Age: 61
End: 2021-08-13

## 2021-08-20 ENCOUNTER — HOSPITAL ENCOUNTER (OUTPATIENT)
Dept: RADIOLOGY | Facility: HOSPITAL | Age: 61
Discharge: HOME | End: 2021-08-20
Attending: HOSPITALIST
Payer: COMMERCIAL

## 2021-08-20 VITALS — WEIGHT: 148 LBS | BODY MASS INDEX: 29.06 KG/M2 | HEIGHT: 60 IN

## 2021-08-20 DIAGNOSIS — Z12.31 ENCOUNTER FOR SCREENING MAMMOGRAM FOR BREAST CANCER: ICD-10-CM

## 2021-08-20 PROCEDURE — 77067 SCR MAMMO BI INCL CAD: CPT | Mod: 26,,, | Performed by: RADIOLOGY

## 2021-08-20 PROCEDURE — 77063 MAMMO DIGITAL SCREENING BILAT WITH TOMO: ICD-10-PCS | Mod: 26,,, | Performed by: RADIOLOGY

## 2021-08-20 PROCEDURE — 77067 MAMMO DIGITAL SCREENING BILAT WITH TOMO: ICD-10-PCS | Mod: 26,,, | Performed by: RADIOLOGY

## 2021-08-20 PROCEDURE — 77063 BREAST TOMOSYNTHESIS BI: CPT | Mod: 26,,, | Performed by: RADIOLOGY

## 2021-08-20 PROCEDURE — 77067 SCR MAMMO BI INCL CAD: CPT | Mod: TC

## 2021-08-24 ENCOUNTER — TELEPHONE (OUTPATIENT)
Dept: INTERNAL MEDICINE | Facility: CLINIC | Age: 61
End: 2021-08-24

## 2021-09-27 ENCOUNTER — TELEPHONE (OUTPATIENT)
Dept: ENDOSCOPY | Facility: HOSPITAL | Age: 61
End: 2021-09-27

## 2021-09-28 ENCOUNTER — PATIENT OUTREACH (OUTPATIENT)
Dept: ADMINISTRATIVE | Facility: OTHER | Age: 61
End: 2021-09-28

## 2021-09-29 ENCOUNTER — OFFICE VISIT (OUTPATIENT)
Dept: NEUROLOGY | Facility: CLINIC | Age: 61
End: 2021-09-29
Payer: COMMERCIAL

## 2021-09-29 ENCOUNTER — PATIENT OUTREACH (OUTPATIENT)
Dept: ADMINISTRATIVE | Facility: HOSPITAL | Age: 61
End: 2021-09-29

## 2021-09-29 VITALS
SYSTOLIC BLOOD PRESSURE: 112 MMHG | WEIGHT: 152.56 LBS | BODY MASS INDEX: 29.79 KG/M2 | DIASTOLIC BLOOD PRESSURE: 72 MMHG | HEART RATE: 74 BPM

## 2021-09-29 DIAGNOSIS — B02.29 POST HERPETIC NEURALGIA: Primary | ICD-10-CM

## 2021-09-29 PROCEDURE — 1159F PR MEDICATION LIST DOCUMENTED IN MEDICAL RECORD: ICD-10-PCS | Mod: CPTII,S$GLB,, | Performed by: NEUROMUSCULOSKELETAL MEDICINE & OMM

## 2021-09-29 PROCEDURE — 99999 PR PBB SHADOW E&M-EST. PATIENT-LVL III: CPT | Mod: PBBFAC,,, | Performed by: NEUROMUSCULOSKELETAL MEDICINE & OMM

## 2021-09-29 PROCEDURE — 3008F PR BODY MASS INDEX (BMI) DOCUMENTED: ICD-10-PCS | Mod: CPTII,S$GLB,, | Performed by: NEUROMUSCULOSKELETAL MEDICINE & OMM

## 2021-09-29 PROCEDURE — 99999 PR PBB SHADOW E&M-EST. PATIENT-LVL III: ICD-10-PCS | Mod: PBBFAC,,, | Performed by: NEUROMUSCULOSKELETAL MEDICINE & OMM

## 2021-09-29 PROCEDURE — 99214 OFFICE O/P EST MOD 30 MIN: CPT | Mod: S$GLB,,, | Performed by: NEUROMUSCULOSKELETAL MEDICINE & OMM

## 2021-09-29 PROCEDURE — 3078F DIAST BP <80 MM HG: CPT | Mod: CPTII,S$GLB,, | Performed by: NEUROMUSCULOSKELETAL MEDICINE & OMM

## 2021-09-29 PROCEDURE — 3044F PR MOST RECENT HEMOGLOBIN A1C LEVEL <7.0%: ICD-10-PCS | Mod: CPTII,S$GLB,, | Performed by: NEUROMUSCULOSKELETAL MEDICINE & OMM

## 2021-09-29 PROCEDURE — 1159F MED LIST DOCD IN RCRD: CPT | Mod: CPTII,S$GLB,, | Performed by: NEUROMUSCULOSKELETAL MEDICINE & OMM

## 2021-09-29 PROCEDURE — 3074F PR MOST RECENT SYSTOLIC BLOOD PRESSURE < 130 MM HG: ICD-10-PCS | Mod: CPTII,S$GLB,, | Performed by: NEUROMUSCULOSKELETAL MEDICINE & OMM

## 2021-09-29 PROCEDURE — 3044F HG A1C LEVEL LT 7.0%: CPT | Mod: CPTII,S$GLB,, | Performed by: NEUROMUSCULOSKELETAL MEDICINE & OMM

## 2021-09-29 PROCEDURE — 3078F PR MOST RECENT DIASTOLIC BLOOD PRESSURE < 80 MM HG: ICD-10-PCS | Mod: CPTII,S$GLB,, | Performed by: NEUROMUSCULOSKELETAL MEDICINE & OMM

## 2021-09-29 PROCEDURE — 3008F BODY MASS INDEX DOCD: CPT | Mod: CPTII,S$GLB,, | Performed by: NEUROMUSCULOSKELETAL MEDICINE & OMM

## 2021-09-29 PROCEDURE — 3074F SYST BP LT 130 MM HG: CPT | Mod: CPTII,S$GLB,, | Performed by: NEUROMUSCULOSKELETAL MEDICINE & OMM

## 2021-09-29 PROCEDURE — 99214 PR OFFICE/OUTPT VISIT, EST, LEVL IV, 30-39 MIN: ICD-10-PCS | Mod: S$GLB,,, | Performed by: NEUROMUSCULOSKELETAL MEDICINE & OMM

## 2021-09-30 ENCOUNTER — TELEPHONE (OUTPATIENT)
Dept: GENETICS | Facility: CLINIC | Age: 61
End: 2021-09-30

## 2021-10-27 ENCOUNTER — TELEPHONE (OUTPATIENT)
Dept: INTERNAL MEDICINE | Facility: CLINIC | Age: 61
End: 2021-10-27
Payer: COMMERCIAL

## 2021-11-02 ENCOUNTER — PATIENT OUTREACH (OUTPATIENT)
Dept: ADMINISTRATIVE | Facility: OTHER | Age: 61
End: 2021-11-02
Payer: COMMERCIAL

## 2021-11-04 ENCOUNTER — OFFICE VISIT (OUTPATIENT)
Dept: GASTROENTEROLOGY | Facility: CLINIC | Age: 61
End: 2021-11-04
Payer: COMMERCIAL

## 2021-11-04 VITALS — WEIGHT: 150.81 LBS | BODY MASS INDEX: 29.61 KG/M2 | HEIGHT: 60 IN

## 2021-11-04 DIAGNOSIS — K58.2 IRRITABLE BOWEL SYNDROME WITH BOTH CONSTIPATION AND DIARRHEA: Primary | ICD-10-CM

## 2021-11-04 DIAGNOSIS — K44.9 HIATAL HERNIA: ICD-10-CM

## 2021-11-04 DIAGNOSIS — K21.9 GASTROESOPHAGEAL REFLUX DISEASE WITHOUT ESOPHAGITIS: ICD-10-CM

## 2021-11-04 PROBLEM — Z12.11 COLON CANCER SCREENING: Status: RESOLVED | Noted: 2020-12-02 | Resolved: 2021-11-04

## 2021-11-04 PROCEDURE — 99999 PR PBB SHADOW E&M-EST. PATIENT-LVL II: ICD-10-PCS | Mod: PBBFAC,,, | Performed by: INTERNAL MEDICINE

## 2021-11-04 PROCEDURE — 1159F MED LIST DOCD IN RCRD: CPT | Mod: CPTII,S$GLB,, | Performed by: INTERNAL MEDICINE

## 2021-11-04 PROCEDURE — 3008F PR BODY MASS INDEX (BMI) DOCUMENTED: ICD-10-PCS | Mod: CPTII,S$GLB,, | Performed by: INTERNAL MEDICINE

## 2021-11-04 PROCEDURE — 1160F RVW MEDS BY RX/DR IN RCRD: CPT | Mod: CPTII,S$GLB,, | Performed by: INTERNAL MEDICINE

## 2021-11-04 PROCEDURE — 3008F BODY MASS INDEX DOCD: CPT | Mod: CPTII,S$GLB,, | Performed by: INTERNAL MEDICINE

## 2021-11-04 PROCEDURE — 1159F PR MEDICATION LIST DOCUMENTED IN MEDICAL RECORD: ICD-10-PCS | Mod: CPTII,S$GLB,, | Performed by: INTERNAL MEDICINE

## 2021-11-04 PROCEDURE — 99999 PR PBB SHADOW E&M-EST. PATIENT-LVL II: CPT | Mod: PBBFAC,,, | Performed by: INTERNAL MEDICINE

## 2021-11-04 PROCEDURE — 3044F HG A1C LEVEL LT 7.0%: CPT | Mod: CPTII,S$GLB,, | Performed by: INTERNAL MEDICINE

## 2021-11-04 PROCEDURE — 99212 OFFICE O/P EST SF 10 MIN: CPT | Mod: S$GLB,,, | Performed by: INTERNAL MEDICINE

## 2021-11-04 PROCEDURE — 3044F PR MOST RECENT HEMOGLOBIN A1C LEVEL <7.0%: ICD-10-PCS | Mod: CPTII,S$GLB,, | Performed by: INTERNAL MEDICINE

## 2021-11-04 PROCEDURE — 1160F PR REVIEW ALL MEDS BY PRESCRIBER/CLIN PHARMACIST DOCUMENTED: ICD-10-PCS | Mod: CPTII,S$GLB,, | Performed by: INTERNAL MEDICINE

## 2021-11-04 PROCEDURE — 99212 PR OFFICE/OUTPT VISIT, EST, LEVL II, 10-19 MIN: ICD-10-PCS | Mod: S$GLB,,, | Performed by: INTERNAL MEDICINE

## 2021-11-04 RX ORDER — DICYCLOMINE HYDROCHLORIDE 10 MG/1
10 CAPSULE ORAL
Qty: 90 CAPSULE | Refills: 1 | Status: SHIPPED | OUTPATIENT
Start: 2021-11-04 | End: 2022-09-15 | Stop reason: SDUPTHER

## 2022-01-31 RX ORDER — ALPRAZOLAM 0.25 MG/1
TABLET ORAL
Qty: 60 TABLET | Refills: 2 | Status: SHIPPED | OUTPATIENT
Start: 2022-01-31 | End: 2022-05-04 | Stop reason: SDUPTHER

## 2022-01-31 NOTE — TELEPHONE ENCOUNTER
----- Message from Jason Landeros sent at 1/31/2022  4:14 PM CST -----  Contact: pt  Requesting an RX refill or new RX.  Is this a refill or new RX: refill  RX name and strength (copy/paste from chart):ALPRAZolam (XANAX) 0.25 MG tablet    Is this a 30 day or 90 day RX:   Pharmacy name and phone # (copy/paste from chart):   [email or web address][id number] [address] [address]  Phone: [phone] Fax: [phone]       The doctors have asked that we provide their patients with the following 2 reminders -- prescription refills can take up to 72 hours, and a friendly reminder that in the future you can use your [facility] account to request refills:yes

## 2022-01-31 NOTE — TELEPHONE ENCOUNTER
No new care gaps identified.  Powered by [employer]. Reference number: [id number].   1/31/2022 5:54:04 AM CST

## 2022-02-14 ENCOUNTER — TELEPHONE (OUTPATIENT)
Dept: INTERNAL MEDICINE | Facility: CLINIC | Age: 62
End: 2022-02-14
Payer: COMMERCIAL

## 2022-02-14 DIAGNOSIS — B96.89 ACUTE BACTERIAL BRONCHITIS: Primary | ICD-10-CM

## 2022-02-14 DIAGNOSIS — J20.8 ACUTE BACTERIAL BRONCHITIS: Primary | ICD-10-CM

## 2022-02-14 NOTE — TELEPHONE ENCOUNTER
----- Message from Carol Holcomb sent at 2/14/2022  3:51 PM CST -----  Contact: [phone]  Patient would like to get medical advice.  Symptoms (please be specific):  cough   How long have you had these symptoms: x2 weeks  Would you like a call back, or a response through your [facility] portal?:   call  Pharmacy name and phone # (copy from chart):    [facility] #[id number] - [address] [address]  Phone: [phone] Fax: [phone]      Comments:

## 2022-02-14 NOTE — TELEPHONE ENCOUNTER
The patient is requesting a Rx refill on the Tessalon capsules.  Also an antibiotic. She states that she is producing yellowish phlegm.

## 2022-02-15 RX ORDER — AZITHROMYCIN 250 MG/1
TABLET, FILM COATED ORAL
Qty: 6 TABLET | Refills: 0 | Status: SHIPPED | OUTPATIENT
Start: 2022-02-15 | End: 2022-02-20

## 2022-02-15 RX ORDER — BENZONATATE 100 MG/1
100 CAPSULE ORAL 3 TIMES DAILY PRN
Qty: 30 CAPSULE | Refills: 0 | Status: SHIPPED | OUTPATIENT
Start: 2022-02-15 | End: 2022-02-25

## 2022-02-15 NOTE — TELEPHONE ENCOUNTER
----- Message from Delma Buckley sent at 2/15/2022  9:13 AM CST -----  Contact: Pt [phone]  Patient is returning a phone call.  Who left a message for the patient: Mariposa   Does patient know what this is regarding:  Medication   Would you like a call back, or a response through your [facility] portal?:   #call back   Comments:

## 2022-03-02 ENCOUNTER — TELEPHONE (OUTPATIENT)
Dept: ENDOSCOPY | Facility: HOSPITAL | Age: 62
End: 2022-03-02
Payer: COMMERCIAL

## 2022-03-02 NOTE — TELEPHONE ENCOUNTER
----- Message from Leia Landeros sent at 3/2/2022 11:32 AM CST -----  Regarding: Follow up appt  Contact: pt  Pt requesting call back to get follow up on her stomach    Pt @ [phone]

## 2022-03-10 ENCOUNTER — TELEPHONE (OUTPATIENT)
Dept: ENDOSCOPY | Facility: HOSPITAL | Age: 62
End: 2022-03-10
Payer: COMMERCIAL

## 2022-03-10 NOTE — TELEPHONE ENCOUNTER
----- Message from Laurie Lucas sent at 3/10/2022 11:19 AM CST -----  Type:  Patient Call Back    Who Called:  brian     What is the reqeust in detail: Pt is requesting a call back regarding a sooner appt , she has an appt on 03/29 and is requesting a call back today in regarding a stomach pain and vomiting if possible. Please Advise     Can the clinic reply by [facility]?    Best Call Back Number:[phone]

## 2022-03-10 NOTE — TELEPHONE ENCOUNTER
----- Message from Eusebio Hernandez sent at 3/10/2022  3:36 PM CST -----  Contact: @[phone]  Pt requesting a call from Dr. Davenport regarding stomach problems.  Please call to discuss further pt also states she need to see an appt sooner.  Pt states she called this morning also.

## 2022-03-11 ENCOUNTER — TELEPHONE (OUTPATIENT)
Dept: ENDOSCOPY | Facility: HOSPITAL | Age: 62
End: 2022-03-11
Payer: COMMERCIAL

## 2022-03-11 NOTE — TELEPHONE ENCOUNTER
----- Message from Eusebio Hernandez sent at 3/11/2022  8:12 AM CST -----  Contact: @[phone]  Pt returning a missed call please call to discuss further.  Pt states she keeps missing your call and she is trying to reach you before clinic starts.

## 2022-03-11 NOTE — TELEPHONE ENCOUNTER
She has had stomach pain and diarrhea for 3 weeks.   She is on Bentyl, gas-X and imodium.   The imodium had helped a little.  This all occurred after she started an ABX.

## 2022-03-17 NOTE — TELEPHONE ENCOUNTER
MD Denise Mcgarry MA  Caller: Unspecified (6 days ago, 11:02 AM)  Has she tried a probiotic?  I recommend Florastor, or an alternative could be to eat one container of Activia yogurt twice daily for 2 weeks.   Patient informed. She has not tried either of these. She will try and get back to us.

## 2022-03-29 ENCOUNTER — OFFICE VISIT (OUTPATIENT)
Dept: GASTROENTEROLOGY | Facility: CLINIC | Age: 62
End: 2022-03-29
Payer: COMMERCIAL

## 2022-03-29 VITALS
WEIGHT: 147.69 LBS | BODY MASS INDEX: 28.99 KG/M2 | HEIGHT: 60 IN | SYSTOLIC BLOOD PRESSURE: 139 MMHG | RESPIRATION RATE: 16 BRPM | DIASTOLIC BLOOD PRESSURE: 82 MMHG | HEART RATE: 80 BPM | OXYGEN SATURATION: 96 %

## 2022-03-29 DIAGNOSIS — T36.95XA ANTIBIOTIC-ASSOCIATED DIARRHEA: ICD-10-CM

## 2022-03-29 DIAGNOSIS — K52.1 ANTIBIOTIC-ASSOCIATED DIARRHEA: ICD-10-CM

## 2022-03-29 DIAGNOSIS — K58.0 IRRITABLE BOWEL SYNDROME WITH DIARRHEA: Primary | ICD-10-CM

## 2022-03-29 PROCEDURE — 3079F PR MOST RECENT DIASTOLIC BLOOD PRESSURE 80-89 MM HG: ICD-10-PCS | Mod: CPTII,S$GLB,, | Performed by: INTERNAL MEDICINE

## 2022-03-29 PROCEDURE — 3075F PR MOST RECENT SYSTOLIC BLOOD PRESS GE 130-139MM HG: ICD-10-PCS | Mod: CPTII,S$GLB,, | Performed by: INTERNAL MEDICINE

## 2022-03-29 PROCEDURE — 99999 PR PBB SHADOW E&M-EST. PATIENT-LVL V: ICD-10-PCS | Mod: PBBFAC,,, | Performed by: INTERNAL MEDICINE

## 2022-03-29 PROCEDURE — 99999 PR PBB SHADOW E&M-EST. PATIENT-LVL V: CPT | Mod: PBBFAC,,, | Performed by: INTERNAL MEDICINE

## 2022-03-29 PROCEDURE — 3075F SYST BP GE 130 - 139MM HG: CPT | Mod: CPTII,S$GLB,, | Performed by: INTERNAL MEDICINE

## 2022-03-29 PROCEDURE — 3008F BODY MASS INDEX DOCD: CPT | Mod: CPTII,S$GLB,, | Performed by: INTERNAL MEDICINE

## 2022-03-29 PROCEDURE — 99213 PR OFFICE/OUTPT VISIT, EST, LEVL III, 20-29 MIN: ICD-10-PCS | Mod: S$GLB,,, | Performed by: INTERNAL MEDICINE

## 2022-03-29 PROCEDURE — 99213 OFFICE O/P EST LOW 20 MIN: CPT | Mod: S$GLB,,, | Performed by: INTERNAL MEDICINE

## 2022-03-29 PROCEDURE — 3008F PR BODY MASS INDEX (BMI) DOCUMENTED: ICD-10-PCS | Mod: CPTII,S$GLB,, | Performed by: INTERNAL MEDICINE

## 2022-03-29 PROCEDURE — 3079F DIAST BP 80-89 MM HG: CPT | Mod: CPTII,S$GLB,, | Performed by: INTERNAL MEDICINE

## 2022-03-29 PROCEDURE — 1159F MED LIST DOCD IN RCRD: CPT | Mod: CPTII,S$GLB,, | Performed by: INTERNAL MEDICINE

## 2022-03-29 PROCEDURE — 1159F PR MEDICATION LIST DOCUMENTED IN MEDICAL RECORD: ICD-10-PCS | Mod: CPTII,S$GLB,, | Performed by: INTERNAL MEDICINE

## 2022-03-29 NOTE — PATIENT INSTRUCTIONS
Try the following to help with your stomach:    Deshawn's colon health - this is an over-the-counter probiotic.    Healther's Tummy Tamers - this is an over-the-counter supplement that might help.  Most likely you will need to order this off of the Internet.    If these do not help in the next 2-3 weeks, let me know.

## 2022-03-29 NOTE — PROGRESS NOTES
[facility] Established Patient Visit    Reason for Visit:    Chief Complaint   Patient presents with    Diarrhea    Emesis    Follow-up    Irritable Bowel Syndrome       PCP: India Camacho      HPI:  Val Horvath is a 61 y.o. female here for evaluation of diarrhea.  I saw her last in November for history of IBS and GERD.  Her symptoms were well controlled at the time with a combination of Bentyl, omeprazole, and dietary modification.  She reports the onset of diarrhea after using azithromycin in the middle of February to treat an upper respiratory illness.  Her diarrhea started after this.  She has bowel movement soon after eating, pretty much with every meal.  She cannot specifically pinpoint any triggers, but eggs seem to cause her troubles.  She has 3-5 bowel movements per day.  Bowel movements are loose to watery, but are occasionally soft.  She denies bloating or abdominal pain.  She denies fevers or chills.  She has used Imodium which works for couple of hours.  She initially tried cultural followed by Florastor.  This actually worsen the diarrhea.           ROS:  Constitutional: No fevers, chills, No weight loss, normal appetite  GI: see HPI          PMHX:  has a past medical history of Anxiety, AR (allergic rhinitis) (3/10/2014), Diverticulosis (2010), Family history of breast cancer in sister, Genital herpes, Hiatal hernia (2010), Irritable bowel syndrome, Meningioma (3/10/2014), and Post herpetic neuralgia (1/21/2014).    PSHX:  has a past surgical history that includes Oophorectomy (Right, 1970s); Carpal tunnel release (Right, 1990s); and Colonoscopy (N/A, 12/2/2020).    The patient's social and family histories were reviewed by me and updated in the appropriate section of the electronic medical record.    Review of patient's allergies indicates:   Allergen Reactions    Codeine Itching    Sulfa (sulfonamide antibiotics) Itching       Prior to Admission medications     Medication Sig Start Date End Date Taking? Authorizing Provider   ALPRAZolam (XANAX) 0.25 MG tablet TAKE 1 TABLET(0.25 MG) BY MOUTH TWICE DAILY AS NEEDED FOR ANXIETY 22  Yes India Camacho MD   dicyclomine (BENTYL) 10 MG capsule Take 1 capsule (10 mg total) by mouth 4 (four) times daily before meals and nightly. 21  Yes Pieter Davenport MD   fluconazole (DIFLUCAN) 150 MG Tab TAKE 1 TABLET BY MOUTH NOW THEN REPEAT IN 3 DAYS AND 6 DAYS 3/23/21  Yes Historical Provider   fluticasone propionate (FLONASE) 50 mcg/actuation nasal spray SHAKE LIQUID AND USE 2 SPRAYS(100 MCG) IN EACH NOSTRIL EVERY DAY 21  Yes India Camacho MD   gabapentin (NEURONTIN) 300 MG capsule TAKE UP TO 6 CAPSULES BY MOUTH EVERY DAY 21  Yes Mike Portillo MD   gabapentin (NEURONTIN) 300 MG capsule TAKE UP TO 6 CAPSULES BY MOUTH EVERY DAY 21  Yes Mike Portillo MD   omeprazole (PRILOSEC) 20 MG capsule take 1 capsule by mouth once daily BEFORE BREAKFAST, PRN 21  Yes Pieter Davenport MD   TRUEPLUS LANCETS 33 gauge Misc SMARTSI Lancet(s) Topical Daily PRN 21  Yes Historical Provider   valACYclovir (VALTREX) 500 MG tablet TAKE 1 TABLET(500 MG) BY MOUTH DAILY AS NEEDED 21  Yes Tacos Medina MD   blood sugar diagnostic Strp 1 strip to check blood sugar once daily as needed.  Patient not taking: No sig reported 21   India Camacho MD   blood-glucose meter Misc 1 Device by Misc.(Non-Drug; Combo Route) route daily as needed (to check blood sugar).  Patient not taking: No sig reported 21  India Camacho MD   cetirizine (ZYRTEC) 10 MG tablet Take 1 tablet (10 mg total) by mouth once daily. 21  India Camacho MD   cyclobenzaprine (FLEXERIL) 10 MG tablet TK 1/2 TO 1 T PO QHS PRN 10/16/18   Historical Provider   gabapentin (NEURONTIN) 300 MG capsule TAKE UP TO 6 CAPSULES BY MOUTH EVERY DAY  Patient not taking: No sig reported 21   Mike Portillo MD   mometasone  0.1% (ELOCON) 0.1 % cream Use daily  Patient not taking: No sig reported 6/22/20   Kimberly Noriega MD   mupirocin (BACTROBAN) 2 % ointment Use qd  Patient not taking: No sig reported 7/14/20   Kimberly Noriega MD   naproxen (NAPROSYN) 500 MG tablet TK 1 T PO  BID PRN WITH FOOD  Patient not taking: No sig reported 4/27/21   India Camacho MD   traMADol (ULTRAM) 50 mg tablet TAKE 1 T BY MOUTH EVERY 6 HOURS AS NEEDED 8/1/19   Historical Provider         Objective Findings:  Vital Signs:  /82   Pulse 80   Resp 16   Ht 5' (1.524 m)   Wt 67 kg (147 lb 11.3 oz)   LMP 01/01/2000 (Within Months)   SpO2 96%   BMI 28.85 kg/m²  Body mass index is 28.85 kg/m².      Physical Exam:  General Appearance:  Well appearing in no acute distress, appears stated age          Labs:  Lab Results   Component Value Date    WBC 7.0 05/29/2021    HGB 13.1 05/29/2021    HCT 40.3 05/29/2021    MCV 88.0 05/29/2021    RDW 13.1 05/29/2021     05/29/2021    LYMPH 1,351 05/29/2021    LYMPH 19.3 05/29/2021    MONO 329 05/29/2021    MONO 4.7 05/29/2021    EOS 49 05/29/2021    BASO 49 05/29/2021     Lab Results   Component Value Date     05/29/2021    K 4.3 05/29/2021     05/29/2021    CO2 27 05/29/2021    GLU 76 05/29/2021    BUN 12 05/29/2021    CREATININE 0.74 05/29/2021    CALCIUM 9.2 05/29/2021    PROT 6.9 05/29/2021    ALBUMIN 4.3 05/29/2021    BILITOT 0.4 05/29/2021    ALKPHOS 74 05/23/2020    AST 19 05/29/2021    ALT 11 05/29/2021                     Assessment:  Val Horvath is a 61 y.o. female here with:  1. Irritable bowel syndrome with diarrhea    2. Antibiotic-associated diarrhea      Believe that the recent antibiotic use likely altered her intestinal microbiome and resulted in changes to her IBS. Culturelle no help and Florastor worsened symptoms.  Imodium provides limited relief.        Recommendations:  1. Try Miaoyushang and zee's tummy tamers OTC supplement.  2. If no improvement in  the next 2-3 weeks, will consider use of rifaximin.      Follow-up 6 months          Pieter Davenport MD

## 2022-05-04 ENCOUNTER — TELEPHONE (OUTPATIENT)
Dept: INTERNAL MEDICINE | Facility: CLINIC | Age: 62
End: 2022-05-04
Payer: COMMERCIAL

## 2022-05-04 RX ORDER — ALPRAZOLAM 0.25 MG/1
TABLET ORAL
Qty: 60 TABLET | Refills: 0 | Status: SHIPPED | OUTPATIENT
Start: 2022-05-10 | End: 2022-05-31 | Stop reason: SDUPTHER

## 2022-05-04 NOTE — TELEPHONE ENCOUNTER
----- Message from Rukhsana Wu sent at 5/4/2022 10:37 AM CDT -----  Regarding: refill request  Contact: patient [phone]  Requesting an RX refill or new RX.  Is this a refill or new RX: refill  RX name and strength (ALPRAZolam (XANAX) 0.25 MG tablet    Is this a 30 day or 90 day RX: 30  Pharmacy name and phone # (   [facility] #[id number] - [address] [address]  Phone: [phone] Fax: [phone]  The doctors have asked that we provide their patients with the following 2 reminders -- prescription refills can take up to 72 hours, and a friendly reminder that in the future you can use your [facility] account to request refills yes

## 2022-05-31 ENCOUNTER — OFFICE VISIT (OUTPATIENT)
Dept: INTERNAL MEDICINE | Facility: CLINIC | Age: 62
End: 2022-05-31
Payer: COMMERCIAL

## 2022-05-31 VITALS
DIASTOLIC BLOOD PRESSURE: 72 MMHG | TEMPERATURE: 98 F | RESPIRATION RATE: 18 BRPM | OXYGEN SATURATION: 96 % | SYSTOLIC BLOOD PRESSURE: 108 MMHG | HEART RATE: 74 BPM | BODY MASS INDEX: 28.13 KG/M2 | HEIGHT: 60 IN | WEIGHT: 143.31 LBS

## 2022-05-31 DIAGNOSIS — K21.9 GASTROESOPHAGEAL REFLUX DISEASE WITHOUT ESOPHAGITIS: ICD-10-CM

## 2022-05-31 DIAGNOSIS — F41.0 GENERALIZED ANXIETY DISORDER WITH PANIC ATTACKS: ICD-10-CM

## 2022-05-31 DIAGNOSIS — Z12.31 ENCOUNTER FOR SCREENING MAMMOGRAM FOR BREAST CANCER: ICD-10-CM

## 2022-05-31 DIAGNOSIS — J30.9 ALLERGIC RHINITIS, UNSPECIFIED SEASONALITY, UNSPECIFIED TRIGGER: ICD-10-CM

## 2022-05-31 DIAGNOSIS — F41.1 GENERALIZED ANXIETY DISORDER WITH PANIC ATTACKS: ICD-10-CM

## 2022-05-31 DIAGNOSIS — Z00.00 ANNUAL PHYSICAL EXAM: Primary | ICD-10-CM

## 2022-05-31 DIAGNOSIS — E16.2 HYPOGLYCEMIA: ICD-10-CM

## 2022-05-31 DIAGNOSIS — J30.2 CHRONIC SEASONAL ALLERGIC RHINITIS: ICD-10-CM

## 2022-05-31 DIAGNOSIS — K58.0 IRRITABLE BOWEL SYNDROME WITH DIARRHEA: ICD-10-CM

## 2022-05-31 PROCEDURE — 3074F PR MOST RECENT SYSTOLIC BLOOD PRESSURE < 130 MM HG: ICD-10-PCS | Mod: CPTII,S$GLB,, | Performed by: HOSPITALIST

## 2022-05-31 PROCEDURE — 99999 PR PBB SHADOW E&M-EST. PATIENT-LVL V: ICD-10-PCS | Mod: PBBFAC,,, | Performed by: HOSPITALIST

## 2022-05-31 PROCEDURE — 1160F PR REVIEW ALL MEDS BY PRESCRIBER/CLIN PHARMACIST DOCUMENTED: ICD-10-PCS | Mod: CPTII,S$GLB,, | Performed by: HOSPITALIST

## 2022-05-31 PROCEDURE — 3008F PR BODY MASS INDEX (BMI) DOCUMENTED: ICD-10-PCS | Mod: CPTII,S$GLB,, | Performed by: HOSPITALIST

## 2022-05-31 PROCEDURE — 3078F DIAST BP <80 MM HG: CPT | Mod: CPTII,S$GLB,, | Performed by: HOSPITALIST

## 2022-05-31 PROCEDURE — 99999 PR PBB SHADOW E&M-EST. PATIENT-LVL V: CPT | Mod: PBBFAC,,, | Performed by: HOSPITALIST

## 2022-05-31 PROCEDURE — 3074F SYST BP LT 130 MM HG: CPT | Mod: CPTII,S$GLB,, | Performed by: HOSPITALIST

## 2022-05-31 PROCEDURE — 99396 PR PREVENTIVE VISIT,EST,40-64: ICD-10-PCS | Mod: S$GLB,,, | Performed by: HOSPITALIST

## 2022-05-31 PROCEDURE — 99396 PREV VISIT EST AGE 40-64: CPT | Mod: S$GLB,,, | Performed by: HOSPITALIST

## 2022-05-31 PROCEDURE — 3078F PR MOST RECENT DIASTOLIC BLOOD PRESSURE < 80 MM HG: ICD-10-PCS | Mod: CPTII,S$GLB,, | Performed by: HOSPITALIST

## 2022-05-31 PROCEDURE — 1159F PR MEDICATION LIST DOCUMENTED IN MEDICAL RECORD: ICD-10-PCS | Mod: CPTII,S$GLB,, | Performed by: HOSPITALIST

## 2022-05-31 PROCEDURE — 1159F MED LIST DOCD IN RCRD: CPT | Mod: CPTII,S$GLB,, | Performed by: HOSPITALIST

## 2022-05-31 PROCEDURE — 1160F RVW MEDS BY RX/DR IN RCRD: CPT | Mod: CPTII,S$GLB,, | Performed by: HOSPITALIST

## 2022-05-31 PROCEDURE — 3008F BODY MASS INDEX DOCD: CPT | Mod: CPTII,S$GLB,, | Performed by: HOSPITALIST

## 2022-05-31 RX ORDER — CETIRIZINE HYDROCHLORIDE 10 MG/1
10 TABLET ORAL DAILY
Qty: 90 TABLET | Refills: 3 | Status: SHIPPED | OUTPATIENT
Start: 2022-05-31 | End: 2022-12-16

## 2022-05-31 RX ORDER — ALPRAZOLAM 0.25 MG/1
TABLET ORAL
Qty: 60 TABLET | Refills: 3 | Status: SHIPPED | OUTPATIENT
Start: 2022-05-31 | End: 2022-10-17

## 2022-05-31 NOTE — PROGRESS NOTES
"  Subjective:     @Patient ID: Valbob Horvath is a 61 y.o. female.    Chief Complaint: Annual Exam and Medication Refill (Zyrtec; Xanax; )    HPI  60 yo F with anxiety, seasonal alleries, IBS, GERD, family hx of breast ca, hiatal hernia, diverticulosis, meningioma presents for annual. Pt reports she keeps having "hypoglycemic" spells. Reports especially in the AM or when at work. Starts to feel, sweaty, lightheaded like her blood sugar is low. Reports she does eat/drink regularly. Did get a glucose meter but it does not appear to work. Workup in 2019 was normal. Has not seen a endocrine specialist regarding her symptoms. Endorses chronic left leg pain.       Lipid disorders/ASCVD risk (ages >/= 45 or >/= 20 if increased risk ): ordered  DM (>45y yearly or if obese, HTN): A1c ordered  Eye exam:  last eye   Breast Cancer (40-50y discretion of pt, 50-74y every 1-2 years): due in August   Cervical Cancer (Pap Smear ages 21-65 every 3 years or Pap + HPV q5 years after 30 years of age): reports done with  Dr. Darrell Balderrama    Colorectal Cancer (normal risk 50-75yr): Colonoscopy Last done [id number]. Follows with GI Dr Davenport. Done 12/2020         Vaccines:   Influenza (yearly): n/a  Tetanus (every 10 yrs - 1st tdap) done 1/2018  PPSV23(>66yo or <65 w/ lung dz, smoking, DM): n/a   Zoster (>61yo) utd   Covid19: due for booster         Exercise: biking, walking   Diet: regular      Review of Systems   Constitutional: Negative for chills and fever.   HENT: Negative for congestion and sore throat.    Eyes: Negative for pain and visual disturbance.   Respiratory: Negative for cough and shortness of breath.    Cardiovascular: Negative for chest pain and leg swelling.   Gastrointestinal: Negative for abdominal pain, nausea and vomiting.   Endocrine: Negative for polydipsia and polyuria.   Genitourinary: Negative for difficulty urinating and dysuria.   Musculoskeletal: Negative for arthralgias and back pain.        + leg pain "   Skin: Negative for rash and wound.   Neurological: Negative for dizziness, weakness and headaches.   Psychiatric/Behavioral: Negative for agitation and confusion.     Past medical history, surgical history, and family medical history reviewed and updated as appropriate.    Medications and allergies reviewed.     Objective:     There were no vitals filed for this visit.  There is no height or weight on file to calculate BMI.  Physical Exam  Vitals reviewed.   Constitutional:       General: She is not in acute distress.     Appearance: She is well-developed.   HENT:      Head: Normocephalic and atraumatic.      Right Ear: Tympanic membrane normal.      Left Ear: Tympanic membrane normal.      Mouth/Throat:      Mouth: Mucous membranes are moist.      Pharynx: No oropharyngeal exudate.   Eyes:      General:         Right eye: No discharge.         Left eye: No discharge.      Conjunctiva/sclera: Conjunctivae normal.   Cardiovascular:      Rate and Rhythm: Normal rate and regular rhythm.      Heart sounds: No murmur heard.    No friction rub.   Pulmonary:      Effort: Pulmonary effort is normal.      Breath sounds: Normal breath sounds.   Abdominal:      General: Bowel sounds are normal. There is no distension.      Palpations: Abdomen is soft.      Tenderness: There is no abdominal tenderness. There is no guarding.   Musculoskeletal:         General: Normal range of motion.      Cervical back: Normal range of motion and neck supple.      Right lower leg: No edema.      Comments: + LLE varicose veins and mild leg swelling   Lymphadenopathy:      Cervical: No cervical adenopathy.   Skin:     General: Skin is warm and dry.   Neurological:      Mental Status: She is alert and oriented to person, place, and time.   Psychiatric:         Mood and Affect: Mood normal.         Behavior: Behavior normal.         Lab Results   Component Value Date    WBC 7.0 05/29/2021    HGB 13.1 05/29/2021    HCT 40.3 05/29/2021      05/29/2021    CHOL 190 05/29/2021    TRIG 126 05/29/2021    HDL 61 05/29/2021    ALT 11 05/29/2021    AST 19 05/29/2021     05/29/2021    K 4.3 05/29/2021     05/29/2021    CREATININE 0.74 05/29/2021    BUN 12 05/29/2021    CO2 27 05/29/2021    TSH 1.16 05/29/2021    HGBA1C 5.6 05/29/2021       Assessment:     1. Annual physical exam    2. Generalized anxiety disorder with panic attacks    3. bmi 27    4. Chronic seasonal allergic rhinitis    5. Gastroesophageal reflux disease without esophagitis    6. Irritable bowel syndrome with diarrhea    7. Encounter for screening mammogram for breast cancer    8. Hypoglycemia    9. Allergic rhinitis, unspecified seasonality, unspecified trigger      Plan:   Val was seen today for annual exam and medication refill.    Diagnoses and all orders for this visit:    Annual physical exam  -     Comprehensive Metabolic Panel; Future  -     CBC Auto Differential; Future  -     TSH; Future  -     Lipid Panel; Future  -     Urinalysis; Future  -     Hemoglobin A1C; Future  -     C-peptide; Future  -     Insulin, random; Future  -     Beta - Hydroxybutyrate, Serum; Future  -     Hypoglycemic Agent Screen; Future  -     Proinsulin; Future  -     Cortisol, 8AM; Future    Generalized anxiety disorder with panic attacks  - - Stable. Continue home meds     BMI 27  - improved from last year. Continue to monitor     Chronic seasonal allergic rhinitis  - Stable. Continue home meds     Gastroesophageal reflux disease without esophagitis  - Stable. Continue home meds and f/u with GI    Irritable bowel syndrome with diarrhea  - Stable. Continue home meds and f/u with GI    Encounter for screening mammogram for breast cancer  -     Mammo Digital Screening Bilat w/ Jimmy; Future    Hypoglycemia  - Unclear if true hypoglycemia. Will repeat w/u. Pt counseled to get new meter and monitor readings   -     Ambulatory referral/consult to Endocrinology; Future  -     C-peptide; Future  -      Insulin, random; Future  -     Beta - Hydroxybutyrate, Serum; Future  -     Hypoglycemic Agent Screen; Future  -     Proinsulin; Future  -     Cortisol, 8AM; Future    Allergic rhinitis, unspecified seasonality, unspecified trigger  -     cetirizine (ZYRTEC) 10 MG tablet; Take 1 tablet (10 mg total) by mouth once daily.    Other orders  -     ALPRAZolam (XANAX) 0.25 MG tablet; TAKE 1 TABLET(0.25 MG) BY MOUTH TWICE DAILY AS NEEDED FOR ANXIETY        rtc 1 year and prn     India Camacho MD  Internal Medicine    5/31/2022

## 2022-06-01 ENCOUNTER — PATIENT OUTREACH (OUTPATIENT)
Dept: ADMINISTRATIVE | Facility: HOSPITAL | Age: 62
End: 2022-06-01
Payer: COMMERCIAL

## 2022-06-01 NOTE — LETTER
AUTHORIZATION FOR RELEASE OF   CONFIDENTIAL INFORMATION      We are seeing Val Horvath, date of birth 1960, in the clinic at [facility]. India Camacho MD is the patient's PCP. Val Horvath has an outstanding lab/procedure at the time we reviewed her chart. In order to help keep her health information updated, she has authorized us to request the following medical record(s):        (  )  MAMMOGRAM                                      (  )  COLONOSCOPY      (  )  PAP SMEAR                                          (  )  OUTSIDE LAB RESULTS     (  )  DEXA SCAN                                          ( x )  EYE EXAM            (  )  FOOT EXAM                                          (  )  ENTIRE RECORD     (  )  OUTSIDE IMMUNIZATIONS                 (  )  _______________         Please fax records to [facility], Miriam C Azuoru, MD, [phone]     If you have any questions, please contact Abby at [phone].           Patient Name: Val Horvath  : 1960  Patient Phone #: [phone]

## 2022-06-06 ENCOUNTER — TELEPHONE (OUTPATIENT)
Dept: INTERNAL MEDICINE | Facility: CLINIC | Age: 62
End: 2022-06-06
Payer: COMMERCIAL

## 2022-06-06 DIAGNOSIS — Z00.00 ANNUAL PHYSICAL EXAM: Primary | ICD-10-CM

## 2022-06-06 DIAGNOSIS — E16.2 HYPOGLYCEMIA: ICD-10-CM

## 2022-06-06 NOTE — TELEPHONE ENCOUNTER
----- Message from Allegra Obregon sent at 6/6/2022  9:11 AM CDT -----  Contact: [phone] Patient  Pt is requesting her lab orders be sent to [facility] on [address]. Pt states she went on 06/03/22 and [facility] did not have the order. Pt states she wants to come to the office to  the paper copy of the orders today.  Pt states she is p/off and is going to call pt relations if she does not get any satisfaction.   Please call and advise.

## 2022-06-06 NOTE — TELEPHONE ENCOUNTER
----- Message from Clotilde Nicholas sent at 6/6/2022  7:47 AM CDT -----  Contact: [phone]  What orders is pt asking for?: annual lab order    Is there a future appointment with the provider?: yes    When?: 05/31/22    Comments?: [facility]. Please call and advise. Thank you. Patient is asking for a call.

## 2022-06-06 NOTE — TELEPHONE ENCOUNTER
----- Message from Sharee Arambula sent at 6/6/2022 12:51 PM CDT -----  Contact: pt   Patient is returning a phone call.  Who left a message for the patient: Mariposa Rao LPN  Does patient know what this is regarding:  missed call  Would you like a call back, or a response through your [facility] portal?:   phone  Comments:

## 2022-06-06 NOTE — TELEPHONE ENCOUNTER
----- Message from Sharee Arambula sent at 6/6/2022  4:01 PM CDT -----  Contact: [phone]  Patient is returning a phone call.  Who left a message for the patient: Mariposa Rao LPN   Does patient know what this is regarding:  missed call  Would you like a call back, or a response through your [facility] portal?:   phone  Comments:

## 2022-06-06 NOTE — TELEPHONE ENCOUNTER
Please send labs to [email or web address]. There are some labs that may need to be reordered.  Ex. insulin

## 2022-06-12 LAB
ALBUMIN SERPL-MCNC: 4.5 G/DL (ref 3.6–5.1)
ALBUMIN/GLOB SERPL: 1.8 (CALC) (ref 1–2.5)
ALP SERPL-CCNC: 74 U/L (ref 37–153)
ALT SERPL-CCNC: 11 U/L (ref 6–29)
APPEARANCE UR: CLEAR
AST SERPL-CCNC: 16 U/L (ref 10–35)
BASOPHILS # BLD AUTO: 52 CELLS/UL (ref 0–200)
BASOPHILS NFR BLD AUTO: 1 %
BILIRUB SERPL-MCNC: 0.5 MG/DL (ref 0.2–1.2)
BILIRUB UR QL STRIP: NEGATIVE
BUN SERPL-MCNC: 14 MG/DL (ref 7–25)
BUN/CREAT SERPL: NORMAL (CALC) (ref 6–22)
C PEPTIDE SERPL-MCNC: 0.8 NG/ML (ref 0.8–3.85)
CALCIUM SERPL-MCNC: 9.5 MG/DL (ref 8.6–10.4)
CHLORIDE SERPL-SCNC: 106 MMOL/L (ref 98–110)
CHOLEST SERPL-MCNC: 196 MG/DL
CHOLEST/HDLC SERPL: 2.7 (CALC)
CO2 SERPL-SCNC: 27 MMOL/L (ref 20–32)
COLOR UR: YELLOW
CORTIS AM PEAK SERPL-MCNC: 12.2 MCG/DL
CREAT SERPL-MCNC: 0.61 MG/DL (ref 0.5–0.99)
EOSINOPHIL # BLD AUTO: 62 CELLS/UL (ref 15–500)
EOSINOPHIL NFR BLD AUTO: 1.2 %
ERYTHROCYTE [DISTWIDTH] IN BLOOD BY AUTOMATED COUNT: 13.3 % (ref 11–15)
GLOBULIN SER CALC-MCNC: 2.5 G/DL (CALC) (ref 1.9–3.7)
GLUCOSE SERPL-MCNC: 83 MG/DL (ref 65–99)
GLUCOSE UR QL STRIP: NEGATIVE
HBA1C MFR BLD: 5.4 % OF TOTAL HGB
HCT VFR BLD AUTO: 41.9 % (ref 35–45)
HDLC SERPL-MCNC: 72 MG/DL
HGB BLD-MCNC: 13.5 G/DL (ref 11.7–15.5)
HGB UR QL STRIP: NEGATIVE
KETONES UR QL STRIP: NEGATIVE
LDLC SERPL CALC-MCNC: 105 MG/DL (CALC)
LEUKOCYTE ESTERASE UR QL STRIP: NEGATIVE
LYMPHOCYTES # BLD AUTO: 1056 CELLS/UL (ref 850–3900)
LYMPHOCYTES NFR BLD AUTO: 20.3 %
MCH RBC QN AUTO: 28.2 PG (ref 27–33)
MCHC RBC AUTO-ENTMCNC: 32.2 G/DL (ref 32–36)
MCV RBC AUTO: 87.5 FL (ref 80–100)
MONOCYTES # BLD AUTO: 291 CELLS/UL (ref 200–950)
MONOCYTES NFR BLD AUTO: 5.6 %
NEUTROPHILS # BLD AUTO: 3739 CELLS/UL (ref 1500–7800)
NEUTROPHILS NFR BLD AUTO: 71.9 %
NITRITE UR QL STRIP: NEGATIVE
NONHDLC SERPL-MCNC: 124 MG/DL (CALC)
PH UR STRIP: 6.5 [PH] (ref 5–8)
PLATELET # BLD AUTO: 206 THOUSAND/UL (ref 140–400)
PMV BLD REES-ECKER: 11.2 FL (ref 7.5–12.5)
POTASSIUM SERPL-SCNC: 4.2 MMOL/L (ref 3.5–5.3)
PROT SERPL-MCNC: 7 G/DL (ref 6.1–8.1)
PROT UR QL STRIP: NEGATIVE
RBC # BLD AUTO: 4.79 MILLION/UL (ref 3.8–5.1)
SODIUM SERPL-SCNC: 140 MMOL/L (ref 135–146)
SP GR UR STRIP: 1.02 (ref 1–1.03)
TRIGL SERPL-MCNC: 95 MG/DL
TSH SERPL-ACNC: 0.92 MIU/L (ref 0.4–4.5)
WBC # BLD AUTO: 5.2 THOUSAND/UL (ref 3.8–10.8)

## 2022-06-16 LAB — B-OH-BUTYR SERPL-SCNC: 0.04 MMOL/L

## 2022-06-21 DIAGNOSIS — J30.9 ALLERGIC RHINITIS, UNSPECIFIED SEASONALITY, UNSPECIFIED TRIGGER: ICD-10-CM

## 2022-06-21 RX ORDER — FLUTICASONE PROPIONATE 50 MCG
SPRAY, SUSPENSION (ML) NASAL
Qty: 48 G | Refills: 3 | Status: SHIPPED | OUTPATIENT
Start: 2022-06-21 | End: 2022-10-13 | Stop reason: SDUPTHER

## 2022-06-21 NOTE — TELEPHONE ENCOUNTER
Refill Authorization Note   Val Horvath  is requesting a refill authorization.  Brief Assessment and Rationale for Refill:  Approve     Medication Therapy Plan:       Medication Reconciliation Completed: No   Comments:     No Care Gaps recommended.     Note composed:4:20 PM 06/21/2022

## 2022-06-21 NOTE — TELEPHONE ENCOUNTER
No new care gaps identified.  [facility] Embedded Care Gaps. Reference number: [id number]. 6/21/2022   6:29:52 AM ANA CRISTINAT

## 2022-06-22 LAB
INSULIN SERPL-ACNC: 4.8 UIU/ML
PROINSULIN SERPL-SCNC: 4.3 PMOL/L

## 2022-07-08 ENCOUNTER — OFFICE VISIT (OUTPATIENT)
Dept: ENDOCRINOLOGY | Facility: CLINIC | Age: 62
End: 2022-07-08
Payer: COMMERCIAL

## 2022-07-08 VITALS
SYSTOLIC BLOOD PRESSURE: 112 MMHG | WEIGHT: 140 LBS | BODY MASS INDEX: 27.48 KG/M2 | OXYGEN SATURATION: 97 % | DIASTOLIC BLOOD PRESSURE: 74 MMHG | HEART RATE: 71 BPM | RESPIRATION RATE: 18 BRPM | HEIGHT: 60 IN

## 2022-07-08 DIAGNOSIS — E16.2 HYPOGLYCEMIA: ICD-10-CM

## 2022-07-08 DIAGNOSIS — E16.2 HYPOGLYCEMIA: Primary | ICD-10-CM

## 2022-07-08 DIAGNOSIS — E16.2 HYPOGLYCEMIA: Chronic | ICD-10-CM

## 2022-07-08 PROCEDURE — 3008F PR BODY MASS INDEX (BMI) DOCUMENTED: ICD-10-PCS | Mod: CPTII,S$GLB,, | Performed by: INTERNAL MEDICINE

## 2022-07-08 PROCEDURE — 1159F PR MEDICATION LIST DOCUMENTED IN MEDICAL RECORD: ICD-10-PCS | Mod: CPTII,S$GLB,, | Performed by: INTERNAL MEDICINE

## 2022-07-08 PROCEDURE — 3008F BODY MASS INDEX DOCD: CPT | Mod: CPTII,S$GLB,, | Performed by: INTERNAL MEDICINE

## 2022-07-08 PROCEDURE — 3074F SYST BP LT 130 MM HG: CPT | Mod: CPTII,S$GLB,, | Performed by: INTERNAL MEDICINE

## 2022-07-08 PROCEDURE — 99999 PR PBB SHADOW E&M-EST. PATIENT-LVL IV: CPT | Mod: PBBFAC,,, | Performed by: INTERNAL MEDICINE

## 2022-07-08 PROCEDURE — 3044F HG A1C LEVEL LT 7.0%: CPT | Mod: CPTII,S$GLB,, | Performed by: INTERNAL MEDICINE

## 2022-07-08 PROCEDURE — 3078F PR MOST RECENT DIASTOLIC BLOOD PRESSURE < 80 MM HG: ICD-10-PCS | Mod: CPTII,S$GLB,, | Performed by: INTERNAL MEDICINE

## 2022-07-08 PROCEDURE — 3074F PR MOST RECENT SYSTOLIC BLOOD PRESSURE < 130 MM HG: ICD-10-PCS | Mod: CPTII,S$GLB,, | Performed by: INTERNAL MEDICINE

## 2022-07-08 PROCEDURE — 99999 PR PBB SHADOW E&M-EST. PATIENT-LVL IV: ICD-10-PCS | Mod: PBBFAC,,, | Performed by: INTERNAL MEDICINE

## 2022-07-08 PROCEDURE — 3078F DIAST BP <80 MM HG: CPT | Mod: CPTII,S$GLB,, | Performed by: INTERNAL MEDICINE

## 2022-07-08 PROCEDURE — 1159F MED LIST DOCD IN RCRD: CPT | Mod: CPTII,S$GLB,, | Performed by: INTERNAL MEDICINE

## 2022-07-08 PROCEDURE — 3044F PR MOST RECENT HEMOGLOBIN A1C LEVEL <7.0%: ICD-10-PCS | Mod: CPTII,S$GLB,, | Performed by: INTERNAL MEDICINE

## 2022-07-08 PROCEDURE — 99203 PR OFFICE/OUTPT VISIT, NEW, LEVL III, 30-44 MIN: ICD-10-PCS | Mod: S$GLB,,, | Performed by: INTERNAL MEDICINE

## 2022-07-08 PROCEDURE — 99203 OFFICE O/P NEW LOW 30 MIN: CPT | Mod: S$GLB,,, | Performed by: INTERNAL MEDICINE

## 2022-07-08 RX ORDER — LANCETS 33 GAUGE
EACH MISCELLANEOUS
Qty: 100 EACH | Refills: 0 | Status: SHIPPED | OUTPATIENT
Start: 2022-07-08 | End: 2022-07-11

## 2022-07-08 NOTE — PROGRESS NOTES
NEW PATIENT VISIT    Subjective:      Chief Complaint: Hypoglycemic symptoms      HPI: Val Horvath is a 61 y.o. female who is here for an initial evaluation for hypoglycemic symptoms      Past Medical History:   Diagnosis Date    Anxiety     AR (allergic rhinitis) 3/10/2014    Diverticulosis 2010    Family history of breast cancer in sister     Genital herpes     Hiatal hernia 2010    Irritable bowel syndrome     Meningioma 3/10/2014    MRI 2/14    Post herpetic neuralgia 1/21/2014 1/14          With regards to hypoglycemia:    Symptoms first started several years ago.  Symptoms include feeling faint, light headed, tingling in her feet, shaky sometimes, confusion.  Symptoms occur on a daily basis.  She has not checked her blood sugar during the episodes.  She was previously prescribed a blood glucose meter and strips.  However, she said that it isn't working.  After further discussion, we discovered that she actually did not know how to use the glucometer and she was educated today.  Blood glucose in clinic today was 124.     Treatment includes eating or drinking - many times the symptoms don't respond to eating and will continue all day.    Context of events:  No relation to meals, but does tend to occur in the morning more often than evening.    Has IBS. No stomach surgeries.    No diagnosis of diabetes    Weight fluctuates, but more or less has been stable over the last few years.        Reviewed past medical, family, social history and updated as appropriate.    Objective:     Vitals:    07/08/22 0938   BP: 112/74   Pulse: 71   Resp: 18         BP Readings from Last 5 Encounters:   07/08/22 112/74   05/31/22 108/72   03/29/22 139/82   09/29/21 112/72   05/28/21 110/60         Physical Exam  Vitals and nursing note reviewed.   Constitutional:       General: She is not in acute distress.     Appearance: She is well-developed.   HENT:      Head: Normocephalic and atraumatic.   Eyes:      General:  Patient left message requesting a call back with dates of next appointment with Dr. Osorio.      Chart reviewed, patient is s/p 12/3/2019 L4-5 posterior spinal fusion revision, right hemilaminectomy and TLIF. She was last seen on 1/17/2020 by Dr. Osorio for her 6 week postoperative visit and XR. Patient was instructed to follow up at 12 weeks with new XR.            Right eye: No discharge.         Left eye: No discharge.      Conjunctiva/sclera: Conjunctivae normal.   Neck:      Thyroid: No thyromegaly.      Trachea: No tracheal deviation.   Cardiovascular:      Rate and Rhythm: Normal rate.   Pulmonary:      Effort: Pulmonary effort is normal. No respiratory distress.   Musculoskeletal:      Comments: No digital clubbing or extremity cyanosis   Neurological:      Mental Status: She is alert and oriented to person, place, and time.      Coordination: Coordination normal.   Psychiatric:         Behavior: Behavior normal.      Comments: Mildly anxious appearing           Wt Readings from Last 30 Encounters:   07/08/22 0938 63.5 kg (139 lb 15.9 oz)   05/31/22 1543 65 kg (143 lb 4.8 oz)   03/29/22 1340 67 kg (147 lb 11.3 oz)   11/04/21 1534 68.4 kg (150 lb 12.7 oz)   09/29/21 0825 69.2 kg (152 lb 8.9 oz)   08/20/21 0951 67.1 kg (148 lb)   05/28/21 0831 71.5 kg (157 lb 10.1 oz)   04/09/21 1103 70.4 kg (155 lb 3.3 oz)   12/02/20 0925 63.5 kg (140 lb)   10/23/20 1539 68.5 kg (151 lb)   09/09/20 1531 68.9 kg (151 lb 14.4 oz)   06/22/20 1527 67.6 kg (149 lb)   05/20/20 1546 67.7 kg (149 lb 4 oz)   09/03/19 1543 67.5 kg (148 lb 13 oz)   08/15/19 1558 66.5 kg (146 lb 9.7 oz)   04/29/19 1548 64.5 kg (142 lb 3.2 oz)   12/18/18 1725 59.9 kg (132 lb)   12/18/18 0934 59 kg (130 lb)   10/29/18 1624 59 kg (130 lb 1.1 oz)   05/12/18 1351 59.9 kg (132 lb)   04/20/18 1548 59.9 kg (132 lb)   01/25/18 1514 59.6 kg (131 lb 6.4 oz)   12/14/17 1542 59.9 kg (132 lb 0.9 oz)   10/13/17 1611 59 kg (130 lb 1.1 oz)   09/18/17 1602 60.3 kg (133 lb)   06/27/17 1624 64 kg (141 lb)   09/06/16 1601 64 kg (141 lb 1.5 oz)   06/20/16 1550 61.1 kg (134 lb 12.8 oz)   04/15/16 1625 62.2 kg (137 lb 2 oz)   01/25/16 1529 62.6 kg (138 lb)       Lab Results   Component Value Date    HGBA1C 5.4 06/11/2022     Lab Results   Component Value Date    CHOL 196 06/11/2022    HDL 72 06/11/2022    LDLCALC 105 (H) 06/11/2022     TRIG 95 06/11/2022    CHOLHDL 2.7 06/11/2022     Lab Results   Component Value Date     06/11/2022    K 4.2 06/11/2022     06/11/2022    CO2 27 06/11/2022    GLU 83 06/11/2022    BUN 14 06/11/2022    CREATININE 0.61 06/11/2022    CALCIUM 9.5 06/11/2022    PROT 7.0 06/11/2022    ALBUMIN 4.5 06/11/2022    BILITOT 0.5 06/11/2022    ALKPHOS 74 05/23/2020    AST 16 06/11/2022    ALT 11 06/11/2022    ESTGFRAFRICA 113 06/11/2022    EGFRNONAA 98 06/11/2022    TSH 0.92 06/11/2022      No results found for: MICALBCREAT    Assessment/Plan:     Hypoglycemic symptoms  Low suspicion for true hypoglycemia based on the reported history. She does not fulfil Whipple's triad at this time.    Advised to check BG the next time she experiences symptoms. If below 70, call back and we will pursue further workup. If BG is normal at the time of symptoms, we have ruled out hypoglycemia as the cause for her symptoms and alternative causes should be entertained.    Thyroid and adrenal function is normal.

## 2022-07-08 NOTE — ASSESSMENT & PLAN NOTE
Low suspicion for true hypoglycemia based on the reported history. She does not fulfil Whipple's triad at this time.    Advised to check BG the next time she experiences symptoms. If below 70, call back and we will pursue further workup. If BG is normal at the time of symptoms, we have ruled out hypoglycemia as the cause for her symptoms and alternative causes should be entertained.    Thyroid and adrenal function is normal.

## 2022-08-02 DIAGNOSIS — B02.29 POST HERPETIC NEURALGIA: ICD-10-CM

## 2022-08-02 DIAGNOSIS — B02.29 HZV (HERPES ZOSTER VIRUS) POST HERPETIC NEURALGIA: ICD-10-CM

## 2022-08-02 NOTE — TELEPHONE ENCOUNTER
----- Message from Pan Montoya sent at 8/2/2022  9:17 AM CDT -----  Contact: pt  Pt requesting call back RE: Schedule appt for fu  in sept nothing available in epic        Confirmed contact below:  Contact Name:Val Alexandru  Phone Number: [phone]

## 2022-08-03 RX ORDER — GABAPENTIN 300 MG/1
CAPSULE ORAL
Qty: 180 CAPSULE | Refills: 4 | Status: SHIPPED | OUTPATIENT
Start: 2022-08-03 | End: 2022-10-13 | Stop reason: SDUPTHER

## 2022-08-10 ENCOUNTER — TELEPHONE (OUTPATIENT)
Dept: NEUROLOGY | Facility: CLINIC | Age: 62
End: 2022-08-10
Payer: COMMERCIAL

## 2022-08-10 NOTE — TELEPHONE ENCOUNTER
----- Message from Jasbir Mcgee sent at 8/9/2022 10:55 AM CDT -----  Regarding: appt  Contact: @ [phone]  2nd Attempt: Patient calling to get an update on the NP appt to establish care ( former patient of Dr Portillo ) pls call

## 2022-08-10 NOTE — TELEPHONE ENCOUNTER
Pt was previously followed by Dr. Portillo at [facility] for post herpetic neuralgia. Well managed on gabapentin and follows up annually.     Appt offered/accepted for October 14th @ 3:30pm(needs late appt due to work) with Dr. Hailee Hendricks at [facility]. Appt letter placed in the mail.

## 2022-08-22 ENCOUNTER — HOSPITAL ENCOUNTER (OUTPATIENT)
Dept: RADIOLOGY | Facility: HOSPITAL | Age: 62
Discharge: HOME | End: 2022-08-22
Attending: HOSPITALIST
Payer: COMMERCIAL

## 2022-08-22 VITALS — BODY MASS INDEX: 27.29 KG/M2 | WEIGHT: 139 LBS | HEIGHT: 60 IN

## 2022-08-22 DIAGNOSIS — Z12.31 ENCOUNTER FOR SCREENING MAMMOGRAM FOR BREAST CANCER: ICD-10-CM

## 2022-08-22 PROCEDURE — 77063 BREAST TOMOSYNTHESIS BI: CPT | Mod: 26,,, | Performed by: RADIOLOGY

## 2022-08-22 PROCEDURE — 77067 SCR MAMMO BI INCL CAD: CPT | Mod: TC

## 2022-08-22 PROCEDURE — 77063 MAMMO DIGITAL SCREENING BILAT WITH TOMO: ICD-10-PCS | Mod: 26,,, | Performed by: RADIOLOGY

## 2022-08-22 PROCEDURE — 77067 MAMMO DIGITAL SCREENING BILAT WITH TOMO: ICD-10-PCS | Mod: 26,,, | Performed by: RADIOLOGY

## 2022-08-22 PROCEDURE — 77067 SCR MAMMO BI INCL CAD: CPT | Mod: 26,,, | Performed by: RADIOLOGY

## 2022-09-14 ENCOUNTER — TELEPHONE (OUTPATIENT)
Dept: ENDOSCOPY | Facility: HOSPITAL | Age: 62
End: 2022-09-14
Payer: COMMERCIAL

## 2022-09-14 NOTE — TELEPHONE ENCOUNTER
----- Message from Anita Phoenix sent at 9/14/2022  7:27 AM CDT -----  Name Of Caller: Val     Provider Name: Issac Stapleton     Does patient feel the need to be seen today? 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 She said she needs to know as soon as possible when she can see you to let her job know

## 2022-09-15 RX ORDER — DICYCLOMINE HYDROCHLORIDE 10 MG/1
10 CAPSULE ORAL
Qty: 90 CAPSULE | Refills: 1 | Status: SHIPPED | OUTPATIENT
Start: 2022-09-15 | End: 2023-09-28

## 2022-10-05 ENCOUNTER — TELEPHONE (OUTPATIENT)
Dept: ENDOSCOPY | Facility: HOSPITAL | Age: 62
End: 2022-10-05
Payer: COMMERCIAL

## 2022-10-13 ENCOUNTER — OFFICE VISIT (OUTPATIENT)
Dept: NEUROLOGY | Facility: CLINIC | Age: 62
End: 2022-10-13
Payer: COMMERCIAL

## 2022-10-13 VITALS
BODY MASS INDEX: 27.72 KG/M2 | HEIGHT: 60 IN | DIASTOLIC BLOOD PRESSURE: 72 MMHG | HEART RATE: 82 BPM | WEIGHT: 141.19 LBS | SYSTOLIC BLOOD PRESSURE: 117 MMHG

## 2022-10-13 DIAGNOSIS — B02.29 POST HERPETIC NEURALGIA: ICD-10-CM

## 2022-10-13 DIAGNOSIS — J30.9 ALLERGIC RHINITIS, UNSPECIFIED SEASONALITY, UNSPECIFIED TRIGGER: ICD-10-CM

## 2022-10-13 DIAGNOSIS — I83.93 ASYMPTOMATIC VARICOSE VEINS OF BOTH LOWER EXTREMITIES: Primary | ICD-10-CM

## 2022-10-13 DIAGNOSIS — B02.29 HZV (HERPES ZOSTER VIRUS) POST HERPETIC NEURALGIA: ICD-10-CM

## 2022-10-13 PROCEDURE — 3074F SYST BP LT 130 MM HG: CPT | Mod: CPTII,S$GLB,, | Performed by: STUDENT IN AN ORGANIZED HEALTH CARE EDUCATION/TRAINING PROGRAM

## 2022-10-13 PROCEDURE — 99214 OFFICE O/P EST MOD 30 MIN: CPT | Mod: S$GLB,,, | Performed by: STUDENT IN AN ORGANIZED HEALTH CARE EDUCATION/TRAINING PROGRAM

## 2022-10-13 PROCEDURE — 3044F HG A1C LEVEL LT 7.0%: CPT | Mod: CPTII,S$GLB,, | Performed by: STUDENT IN AN ORGANIZED HEALTH CARE EDUCATION/TRAINING PROGRAM

## 2022-10-13 PROCEDURE — 1159F PR MEDICATION LIST DOCUMENTED IN MEDICAL RECORD: ICD-10-PCS | Mod: CPTII,S$GLB,, | Performed by: STUDENT IN AN ORGANIZED HEALTH CARE EDUCATION/TRAINING PROGRAM

## 2022-10-13 PROCEDURE — 3078F DIAST BP <80 MM HG: CPT | Mod: CPTII,S$GLB,, | Performed by: STUDENT IN AN ORGANIZED HEALTH CARE EDUCATION/TRAINING PROGRAM

## 2022-10-13 PROCEDURE — 1160F RVW MEDS BY RX/DR IN RCRD: CPT | Mod: CPTII,S$GLB,, | Performed by: STUDENT IN AN ORGANIZED HEALTH CARE EDUCATION/TRAINING PROGRAM

## 2022-10-13 PROCEDURE — 99999 PR PBB SHADOW E&M-EST. PATIENT-LVL IV: ICD-10-PCS | Mod: PBBFAC,,, | Performed by: STUDENT IN AN ORGANIZED HEALTH CARE EDUCATION/TRAINING PROGRAM

## 2022-10-13 PROCEDURE — 1160F PR REVIEW ALL MEDS BY PRESCRIBER/CLIN PHARMACIST DOCUMENTED: ICD-10-PCS | Mod: CPTII,S$GLB,, | Performed by: STUDENT IN AN ORGANIZED HEALTH CARE EDUCATION/TRAINING PROGRAM

## 2022-10-13 PROCEDURE — 99999 PR PBB SHADOW E&M-EST. PATIENT-LVL IV: CPT | Mod: PBBFAC,,, | Performed by: STUDENT IN AN ORGANIZED HEALTH CARE EDUCATION/TRAINING PROGRAM

## 2022-10-13 PROCEDURE — 3044F PR MOST RECENT HEMOGLOBIN A1C LEVEL <7.0%: ICD-10-PCS | Mod: CPTII,S$GLB,, | Performed by: STUDENT IN AN ORGANIZED HEALTH CARE EDUCATION/TRAINING PROGRAM

## 2022-10-13 PROCEDURE — 99214 PR OFFICE/OUTPT VISIT, EST, LEVL IV, 30-39 MIN: ICD-10-PCS | Mod: S$GLB,,, | Performed by: STUDENT IN AN ORGANIZED HEALTH CARE EDUCATION/TRAINING PROGRAM

## 2022-10-13 PROCEDURE — 1159F MED LIST DOCD IN RCRD: CPT | Mod: CPTII,S$GLB,, | Performed by: STUDENT IN AN ORGANIZED HEALTH CARE EDUCATION/TRAINING PROGRAM

## 2022-10-13 PROCEDURE — 3074F PR MOST RECENT SYSTOLIC BLOOD PRESSURE < 130 MM HG: ICD-10-PCS | Mod: CPTII,S$GLB,, | Performed by: STUDENT IN AN ORGANIZED HEALTH CARE EDUCATION/TRAINING PROGRAM

## 2022-10-13 PROCEDURE — 3078F PR MOST RECENT DIASTOLIC BLOOD PRESSURE < 80 MM HG: ICD-10-PCS | Mod: CPTII,S$GLB,, | Performed by: STUDENT IN AN ORGANIZED HEALTH CARE EDUCATION/TRAINING PROGRAM

## 2022-10-13 RX ORDER — GABAPENTIN 300 MG/1
CAPSULE ORAL
Qty: 180 CAPSULE | Refills: 11 | Status: SHIPPED | OUTPATIENT
Start: 2022-10-13 | End: 2023-11-17

## 2022-10-13 RX ORDER — ACYCLOVIR 400 MG/1
400 TABLET ORAL DAILY
COMMUNITY
Start: 2022-09-25 | End: 2023-08-10 | Stop reason: SDUPTHER

## 2022-10-13 NOTE — PROGRESS NOTES
[facility] - NEUROLOGY 7TH [place] [facility], [place]    Date: 10/13/22  Patient Name: Val Horvath   MRN: [id number]   PCP: India Camacho  Referring Provider: No ref. provider found    Assessment:   Val Horvath is a 62 y.o. female presenting as initial evaluation for me for post herpetic neuralgia near R pectoralis. Previous patient of Claire, currently maintained on gabapentin 300mg BID PRN. Patient has not had a flare up, currently not having any symptoms. Denies any side effects from medications, usually only takes one in the morning.       Plan:     -- No new recommendations at this time, patient should continue her gabapentin 300mg QD with additional dose PRN if needed. 1 year refill was sent, but this can be refilled by her primary care doctor.   -- No need to follow up given symptoms are stable and have been stable for ~5 years now.     -- she has some varicose veins, referral to Vascular Surgery       Problem List Items Addressed This Visit          Neuro    Post herpetic neuralgia    Overview        Relevant Medications    gabapentin (NEURONTIN) 300 MG capsule     Other Visit Diagnoses       Asymptomatic varicose veins of both lower extremities    -  Primary    Relevant Orders    Ambulatory referral/consult to Vascular Surgery    HZV (herpes zoster virus) post herpetic neuralgia        Relevant Medications    gabapentin (NEURONTIN) 300 MG capsule            Hailee Hendricks MD    Patient note was created using MModal Dictation.  Any errors in syntax or even information may not have been identified and edited on initial review prior to signing this note.  Subjective:   Patient seen in consultation at the request of No ref. provider found for the evaluation of post herpetic neuralgia. A copy of this note will be sent to the referring physician.        HPI:   Ms. Val Horvath is a 62 y.o. female presenting as initial evaluation for post herpetic  neuralgia. Previous patient of Dr. Rangel, last seen on 9/29/21, currently maintained on gabapentin 300mg QD with additional night dose PRN. She has been stable for 5 years and has been seeing him only of medication refills.   Her symptoms started 5 years ago where she developed a rash and pain at R pectoralis near T2 dermatome. She has history of chicken pox and HSV vulvovaginalis currently on acyclovir. She had her shingles vaccine and no issues or worsening pain. Triggers include weather changes         Previous notes from Dr. Rangel   Present illness:  Patient presents for follow-up for post herpetic neuralgia right T1-T3.  Pain is presently 2-3/10 1 gabapentin 300 mg 2-3 tablets per day      Previous note:  9-9-20   Patient presents for follow-up for post herpetic neuralgia.  She is presently taking gabapentin 300 mg 3 times a day as well as Xanax 0.25 mg once or twice per day.  Previous note:  9-3-19  patient presents for follow-up for her post herpetic neuralgia.  She is doing well on gabapentin 300 mg 3-4 times per day.  She typically very is a dose.  She does notice that the weather change still aggravates the condition      Previous note:  4-20-18:  Patient presents for follow-up for postherpetic neuralgia.  She is taking gabapentin 300 mg to-3 tablets per day with good relief.  Occasionally she has very little pain but sometimes the pain will be 8/10 when it does come.  We discussed altering the dose with a baseline dose of twice a day with an option to increase by 1 or 2 pills extra particularly when weather fronts come.        otor examination: Upper and Lower extremities - Normal and symmetrical;muscle tone was normal ; right-handed    Sensory examination:Upper and lower extremities - Pinprick and soft touch were normal and symmetrical. Vibration sense was normal at the toes for age 15-20 seconds    Deep tendon reflexes were 1-2+ symmetrical. Both plantar responses were flexor    Cerebellar  examination upper: Normal finger to nose and rapid alternating movements    Gait: Steady with no ataxia; heel and toe walk normal    Romberg test: negative Tandem gait: Normal    Involuntary movements: Negative    Impression: Right temporal pain-muscle tenderness; Shingles with postherpetic neuralgia right T1-T3 thoracic area; headaches; Meningioma    Recommendations/plan: Continue gabapentin 300 mg 2-3  pills per day if needed.  Again  suggested avoiding taking the medicine during the day with an anxiety medicine.   Follow-up 1 year           PAST MEDICAL HISTORY:  Past Medical History:   Diagnosis Date    Anxiety     AR (allergic rhinitis) 3/10/2014    Diverticulosis 2010    Family history of breast cancer in sister     Genital herpes     Hiatal hernia 2010    Irritable bowel syndrome     Meningioma 3/10/2014    MRI 2/14    Post herpetic neuralgia 1/21/2014 1/14        PAST SURGICAL HISTORY:  Past Surgical History:   Procedure Laterality Date    CARPAL TUNNEL RELEASE Right 1990s    COLONOSCOPY N/A 12/2/2020    Procedure: COLONOSCOPY;  Surgeon: Pieter Davenport MD;  Location: [address]);  Service: Endoscopy;  Laterality: N/A;  patient due for colonoscopy in November 2020  prep ins. emailed  and mailed - COVID screening on 11/29/20 [facility]  patient confirmed new arrival time of 0900-BB    OOPHORECTOMY Right 1970s       CURRENT MEDS:  Current Outpatient Medications   Medication Sig Dispense Refill    acyclovir (ZOVIRAX) 400 MG tablet Take 400 mg by mouth once daily.      ALPRAZolam (XANAX) 0.25 MG tablet TAKE 1 TABLET(0.25 MG) BY MOUTH TWICE DAILY AS NEEDED FOR ANXIETY 60 tablet 3    cetirizine (ZYRTEC) 10 MG tablet Take 1 tablet (10 mg total) by mouth once daily. 90 tablet 3    dicyclomine (BENTYL) 10 MG capsule Take 1 capsule (10 mg total) by mouth 4 (four) times daily before meals and nightly. 90 capsule 1    fluticasone propionate (FLONASE) 50 mcg/actuation nasal spray SHAKE LIQUID AND USE 2  SPRAYS(100 MCG) IN EACH NOSTRIL EVERY DAY 48 g 3    omeprazole (PRILOSEC) 20 MG capsule take 1 capsule by mouth once daily BEFORE BREAKFAST, PRN 90 capsule 0    TRUE METRIX GLUCOSE TEST STRIP Strp CHECK BLOOD SUGAR AT THE TIME OF SYMPTOMS. CALL BACK IF BLOOD SUGAR IS LOW( BELOW 70) FOR FURTHER INSTRUCTION 100 strip 0    TRUEPLUS LANCETS 33 gauge Misc USE TO CHECK BLOOD SUGAR AT TIME OF SYMPTOMS. CALL BACK IF BLOOD SUGAR IS LOW( BELOW 70) 100 each 0    valACYclovir (VALTREX) 500 MG tablet TAKE 1 TABLET(500 MG) BY MOUTH DAILY AS NEEDED 30 tablet 0    cyclobenzaprine (FLEXERIL) 10 MG tablet TK 1/2 TO 1 T PO QHS PRN  1    gabapentin (NEURONTIN) 300 MG capsule TAKE UP TO 6 CAPSULES BY MOUTH EVERY  capsule 11     No current facility-administered medications for this visit.       ALLERGIES:  Review of patient's allergies indicates:   Allergen Reactions    Codeine Itching    Sulfa (sulfonamide antibiotics) Itching       FAMILY HISTORY:  Family History   Problem Relation Age of Onset    Heart disease Mother 65        MI    Hypertension Mother     Kidney disease Mother     Bladder Cancer Father         bladder    Breast cancer Sister 45    No Known Problems Brother     Cancer Paternal Uncle         colon cancer        SOCIAL HISTORY:  Social History     Tobacco Use    Smoking status: Never    Smokeless tobacco: Never   Substance Use Topics    Alcohol use: Yes     Comment: rare    Drug use: Never       Review of Systems:  12 system review of systems is negative except for the symptoms mentioned in HPI.      Objective:     Vitals:    10/13/22 1518   BP: 117/72   BP Location: Left arm   Patient Position: Sitting   BP Method: Large (Automatic)   Pulse: 82   Weight: 64 kg (141 lb 3.3 oz)   Height: 5' (1.524 m)     General: NAD, well nourished   Eyes: no tearing, discharge, no erythema   ENT: moist mucous membranes of the oral cavity, nares patent    Neck: Supple, full range of motion  Cardiovascular: Warm and well perfused,  pulses equal and symmetrical  Lungs: Normal work of breathing, normal chest wall excursions  Skin: No rash, lesions, or breakdown on exposed skin, some discoloration at R chest non tender   Psychiatry: Mood and affect are appropriate   Abdomen: soft, non tender, non distended  Extremeties: No cyanosis, clubbing or edema, prominent veins bl L >r    Neurological   MENTAL STATUS: Alert and oriented to person, place, and time. Attention and concentration within normal limits. Speech without dysarthria, able to name and repeat without difficulty. Recent and remote memory within normal limits   CRANIAL NERVES: Visual fields intact. PERRL. EOMI. Facial sensation intact. Face symmetrical. Hearing grossly intact. Full shoulder shrug bilaterally. Tongue protrudes midline   SENSORY: Sensation is intact to light touch throughout.  Joint position perception intact. Negative Romberg.   MOTOR: Normal bulk and tone. No pronator drift.  5/5 deltoid, biceps, triceps, interosseous, hand  bilaterally. 5/5 iliopsoas, knee extension/flexion, foot dorsi/plantarflexion bilaterally.    REFLEXES: Symmetric and 2+ throughout. Toes down going bilaterally.   CEREBELLAR/COORDINATION/GAIT: Gait steady with normal arm swing and stride length.  Heel to shin intact. Finger to nose intact. Normal rapid alternating movements.

## 2022-10-13 NOTE — TELEPHONE ENCOUNTER
No new care gaps identified.  [facility] Embedded Care Gaps. Reference number: [id number]. 10/13/2022   6:05:42 PM CDT

## 2022-10-13 NOTE — LETTER
"Val"Gretel Horvath was seen at [facility] today 10/13/2022.  She may return to work today      If you have any questions or concerns, please don't hesitate to call.      Hailee Hendricks MD   Neurology Resident, PGY3  [facility] [address]     "

## 2022-10-14 RX ORDER — FLUTICASONE PROPIONATE 50 MCG
1 SPRAY, SUSPENSION (ML) NASAL DAILY PRN
Qty: 48 G | Refills: 6 | Status: SHIPPED | OUTPATIENT
Start: 2022-10-14 | End: 2023-03-23

## 2022-10-17 ENCOUNTER — TELEPHONE (OUTPATIENT)
Dept: INTERNAL MEDICINE | Facility: CLINIC | Age: 62
End: 2022-10-17
Payer: COMMERCIAL

## 2022-10-17 DIAGNOSIS — I83.813 VARICOSE VEINS OF BILATERAL LOWER EXTREMITIES WITH PAIN: Primary | ICD-10-CM

## 2022-10-17 RX ORDER — ALPRAZOLAM 0.25 MG/1
TABLET ORAL
Qty: 60 TABLET | Refills: 3 | Status: SHIPPED | OUTPATIENT
Start: 2022-10-17 | End: 2022-12-16

## 2022-10-17 NOTE — TELEPHONE ENCOUNTER
No new care gaps identified.  [facility] Embedded Care Gaps. Reference number: [id number]. 10/17/2022   5:32:15 AM CDT

## 2022-10-25 ENCOUNTER — TELEPHONE (OUTPATIENT)
Dept: ENDOSCOPY | Facility: HOSPITAL | Age: 62
End: 2022-10-25
Payer: COMMERCIAL

## 2022-11-04 ENCOUNTER — TELEPHONE (OUTPATIENT)
Dept: ENDOSCOPY | Facility: HOSPITAL | Age: 62
End: 2022-11-04
Payer: COMMERCIAL

## 2022-11-25 ENCOUNTER — TELEPHONE (OUTPATIENT)
Dept: ENDOSCOPY | Facility: HOSPITAL | Age: 62
End: 2022-11-25
Payer: COMMERCIAL

## 2023-01-24 ENCOUNTER — OFFICE VISIT (OUTPATIENT)
Dept: GASTROENTEROLOGY | Facility: CLINIC | Age: 63
End: 2023-01-24
Payer: COMMERCIAL

## 2023-01-24 ENCOUNTER — TELEPHONE (OUTPATIENT)
Dept: GASTROENTEROLOGY | Facility: CLINIC | Age: 63
End: 2023-01-24

## 2023-01-24 VITALS
BODY MASS INDEX: 29.04 KG/M2 | HEART RATE: 84 BPM | WEIGHT: 147.94 LBS | DIASTOLIC BLOOD PRESSURE: 74 MMHG | SYSTOLIC BLOOD PRESSURE: 113 MMHG | HEIGHT: 60 IN

## 2023-01-24 DIAGNOSIS — K58.0 IRRITABLE BOWEL SYNDROME WITH DIARRHEA: Primary | ICD-10-CM

## 2023-01-24 DIAGNOSIS — K21.9 GASTROESOPHAGEAL REFLUX DISEASE WITHOUT ESOPHAGITIS: ICD-10-CM

## 2023-01-24 PROCEDURE — 99999 PR PBB SHADOW E&M-EST. PATIENT-LVL IV: ICD-10-PCS | Mod: PBBFAC,,, | Performed by: INTERNAL MEDICINE

## 2023-01-24 PROCEDURE — 99214 PR OFFICE/OUTPT VISIT, EST, LEVL IV, 30-39 MIN: ICD-10-PCS | Mod: S$GLB,,, | Performed by: INTERNAL MEDICINE

## 2023-01-24 PROCEDURE — 3008F BODY MASS INDEX DOCD: CPT | Mod: CPTII,S$GLB,, | Performed by: INTERNAL MEDICINE

## 2023-01-24 PROCEDURE — 1160F PR REVIEW ALL MEDS BY PRESCRIBER/CLIN PHARMACIST DOCUMENTED: ICD-10-PCS | Mod: CPTII,S$GLB,, | Performed by: INTERNAL MEDICINE

## 2023-01-24 PROCEDURE — 3008F PR BODY MASS INDEX (BMI) DOCUMENTED: ICD-10-PCS | Mod: CPTII,S$GLB,, | Performed by: INTERNAL MEDICINE

## 2023-01-24 PROCEDURE — 99999 PR PBB SHADOW E&M-EST. PATIENT-LVL IV: CPT | Mod: PBBFAC,,, | Performed by: INTERNAL MEDICINE

## 2023-01-24 PROCEDURE — 3078F DIAST BP <80 MM HG: CPT | Mod: CPTII,S$GLB,, | Performed by: INTERNAL MEDICINE

## 2023-01-24 PROCEDURE — 1160F RVW MEDS BY RX/DR IN RCRD: CPT | Mod: CPTII,S$GLB,, | Performed by: INTERNAL MEDICINE

## 2023-01-24 PROCEDURE — 1159F MED LIST DOCD IN RCRD: CPT | Mod: CPTII,S$GLB,, | Performed by: INTERNAL MEDICINE

## 2023-01-24 PROCEDURE — 99214 OFFICE O/P EST MOD 30 MIN: CPT | Mod: S$GLB,,, | Performed by: INTERNAL MEDICINE

## 2023-01-24 PROCEDURE — 3074F PR MOST RECENT SYSTOLIC BLOOD PRESSURE < 130 MM HG: ICD-10-PCS | Mod: CPTII,S$GLB,, | Performed by: INTERNAL MEDICINE

## 2023-01-24 PROCEDURE — 1159F PR MEDICATION LIST DOCUMENTED IN MEDICAL RECORD: ICD-10-PCS | Mod: CPTII,S$GLB,, | Performed by: INTERNAL MEDICINE

## 2023-01-24 PROCEDURE — 3078F PR MOST RECENT DIASTOLIC BLOOD PRESSURE < 80 MM HG: ICD-10-PCS | Mod: CPTII,S$GLB,, | Performed by: INTERNAL MEDICINE

## 2023-01-24 PROCEDURE — 3074F SYST BP LT 130 MM HG: CPT | Mod: CPTII,S$GLB,, | Performed by: INTERNAL MEDICINE

## 2023-01-24 RX ORDER — PANTOPRAZOLE SODIUM 40 MG/1
40 TABLET, DELAYED RELEASE ORAL DAILY
Qty: 30 TABLET | Refills: 0 | Status: SHIPPED | OUTPATIENT
Start: 2023-01-24 | End: 2023-01-30

## 2023-01-24 NOTE — LETTER
January 24, 2023      [facility] -  [facility] [address]  [address]  [facility] [address]  Phone: [phone]  Fax: [phone]       Patient: Val Horvath   YOB: 1960  Date of Visit: 01/24/2023    To Whom It May Concern:    Haydee Horvath  was at [facility] on 01/24/2023. The patient may return to workl on 01/25/2023 with no restrictions. If you have any questions or concerns, or if I can be of further assistance, please do not hesitate to contact me.    Sincerely,    Pieter Davenport MD

## 2023-01-24 NOTE — PROGRESS NOTES
[facility] Established Patient Visit    Reason for Visit:    Chief Complaint   Patient presents with    Follow-up       PCP: India Camacho      HPI:  Val Horvath is a 62 y.o. female here for follow-up of interval bowel syndrome and GERD.  I last saw her in March of 2022 for the same.  She was also experiencing antibiotic associated diarrhea at that time.  Since our last visit, her symptoms have been worse over the last several months.  She is been experiencing more generalized abdominal discomfort as well as bloating, gas, and reflux.  Taking omeprazole has worsened her diarrhea.  She is only using his as needed.  She is having heartburn symptoms about 2 days out of the week.  She uses Tums, but this only has modest improvement.  She denies nausea or vomiting.  She is having multiple soft bowel movements per day.  She uses Imodium as needed.  Her abdominal pain is better after bowel movements.          ROS:  Constitutional: No fevers, chills, No weight loss, normal appetite  GI: see HPI        PMHX:  has a past medical history of Anxiety, AR (allergic rhinitis) (3/10/2014), Diverticulosis (2010), Family history of breast cancer in sister, Genital herpes, Hiatal hernia (2010), Irritable bowel syndrome, Meningioma (3/10/2014), and Post herpetic neuralgia (1/21/2014).    PSHX:  has a past surgical history that includes Oophorectomy (Right, 1970s); Carpal tunnel release (Right, 1990s); and Colonoscopy (N/A, 12/2/2020).    The patient's social and family histories were reviewed by me and updated in the appropriate section of the electronic medical record.    Review of patient's allergies indicates:   Allergen Reactions    Codeine Itching    Sulfa (sulfonamide antibiotics) Itching       Prior to Admission medications    Medication Sig Start Date End Date Taking? Authorizing Provider   acyclovir (ZOVIRAX) 400 MG tablet Take 400 mg by mouth once daily. 9/25/22  Yes Historical Provider    ALPRAZolam (XANAX) 0.25 MG tablet TAKE 1 TABLET(0.25 MG) BY MOUTH TWICE DAILY AS NEEDED FOR ANXIETY 12/16/22  Yes India Camacho MD   cetirizine (ZYRTEC) 10 MG tablet TAKE 1 TABLET BY MOUTH EVERY DAY 12/16/22  Yes India Camacho MD   cyclobenzaprine (FLEXERIL) 10 MG tablet TK 1/2 TO 1 T PO QHS PRN 10/16/18  Yes Historical Provider   dicyclomine (BENTYL) 10 MG capsule Take 1 capsule (10 mg total) by mouth 4 (four) times daily before meals and nightly. 9/15/22  Yes Pieter Davenport MD   fluticasone propionate (FLONASE) 50 mcg/actuation nasal spray 1 spray (50 mcg total) by Each Nostril route daily as needed for Rhinitis. 10/14/22  Yes Idnia Camacho MD   gabapentin (NEURONTIN) 300 MG capsule TAKE UP TO 6 CAPSULES BY MOUTH EVERY DAY 10/13/22  Yes Hailee Hendricks MD   TRUE METRIX GLUCOSE TEST STRIP Strp CHECK BLOOD SUGAR AT THE TIME OF SYMPTOMS. CALL BACK IF BLOOD SUGAR IS LOW( BELOW 70) FOR FURTHER INSTRUCTION 7/11/22  Yes Davi Leal MD   TRUEPLUS LANCETS 33 gauge Misc USE TO CHECK BLOOD SUGAR AT TIME OF SYMPTOMS. CALL BACK IF BLOOD SUGAR IS LOW( BELOW 70) 7/11/22  Yes Davi Leal MD   valACYclovir (VALTREX) 500 MG tablet TAKE 1 TABLET(500 MG) BY MOUTH DAILY AS NEEDED 8/5/21  Yes Tacos Medina MD   omeprazole (PRILOSEC) 20 MG capsule take 1 capsule by mouth once daily BEFORE BREAKFAST, PRN 7/14/21 1/24/23 Yes Pieter Davenport MD   pantoprazole (PROTONIX) 40 MG tablet Take 1 tablet (40 mg total) by mouth once daily. 1/24/23 2/23/23  Pieter Davenport MD   rifAXIMin (XIFAXAN) 550 mg Tab Take 1 tablet (550 mg total) by mouth 3 (three) times daily. for 14 days 1/24/23 2/7/23  Pieter Davenport MD         Objective Findings:  Vital Signs:  /74   Pulse 84   Ht 5' (1.524 m)   Wt 67.1 kg (147 lb 14.9 oz)   LMP 01/01/2000 (Within Months)   BMI 28.89 kg/m²  Body mass index is 28.89 kg/m².      Physical Exam:  General Appearance:  Well appearing in no acute distress, appears stated  age  Head:  Normocephalic, atraumatic  Eyes:  No scleral icterus or pallor, EOMI        Labs:  Lab Results   Component Value Date    WBC 5.2 06/11/2022    HGB 13.5 06/11/2022    HCT 41.9 06/11/2022    MCV 87.5 06/11/2022    RDW 13.3 06/11/2022     06/11/2022    LYMPH 1,056 06/11/2022    LYMPH 20.3 06/11/2022    MONO 291 06/11/2022    MONO 5.6 06/11/2022    EOS 62 06/11/2022    BASO 52 06/11/2022     Lab Results   Component Value Date     06/11/2022    K 4.2 06/11/2022     06/11/2022    CO2 27 06/11/2022    GLU 83 06/11/2022    BUN 14 06/11/2022    CREATININE 0.61 06/11/2022    CALCIUM 9.5 06/11/2022    PROT 7.0 06/11/2022    ALBUMIN 4.5 06/11/2022    BILITOT 0.5 06/11/2022    ALKPHOS 74 05/23/2020    AST 16 06/11/2022    ALT 11 06/11/2022                     Assessment:  Val Horvath is a 62 y.o. female here with:  1. Irritable bowel syndrome with diarrhea    2. Gastroesophageal reflux disease without esophagitis      Irritable bowel syndrome with diarrhea and worsening of symptoms over the past several months associated with both bloating, gas, and cramping relieved by bowel movements.  Imodium helps somewhat, but not providing full relief.  Her symptoms are more pervasive in affecting her quality of life.    Also with intermittent GERD and has had side effects to omeprazole with worsened diarrhea.  Over-the-counter antacids only providing modest relief.      Recommendations:  1. I will give her a course of rifaximin 550 mg p.o. t.i.d. for 14 days.  I have asked her to let me know how her symptoms progress after taking this so that we may make any adjustments necessary.  If no improvement, Viberzi might be an option.  2. Change omeprazole to Protonix.  Prescription sent to pharmacy.  Advised her to take 1st thing in the morning, prior to eating.      Follow-up 3 months      Order summary:  Orders Placed This Encounter    pantoprazole (PROTONIX) 40 MG tablet    rifAXIMin (XIFAXAN) 550 mg Tab            Pieter Davenport MD

## 2023-02-02 ENCOUNTER — TELEPHONE (OUTPATIENT)
Dept: ENDOSCOPY | Facility: HOSPITAL | Age: 63
End: 2023-02-02
Payer: COMMERCIAL

## 2023-02-02 DIAGNOSIS — K58.0 IRRITABLE BOWEL SYNDROME WITH DIARRHEA: Primary | ICD-10-CM

## 2023-02-02 NOTE — TELEPHONE ENCOUNTER
----- Message from Luci Landeros sent at 2/2/2023  9:08 AM CST -----  Regarding: Medication  Pt is Requesting a call back regarding Medication Side Effects (XIFAXAN 550 mg Tab)  Pt states medication makes her feel dizzy and also mild headaches please call to discuss further.        Pt@[phone]

## 2023-02-03 NOTE — TELEPHONE ENCOUNTER
MD Denise Mcgarry MA  Caller: Unspecified (Yesterday, 12:30 PM)  Ok.  Try her again later.  If the side effects are not interfering with her daily activities and are manageable, then maybe she can work through it and finish the prescription.             She says that she is having stomach issues, diarrhea and headache. She says it is interfering with her ability to works as she is running to the restroom. She took about 1 week of it and had stopped it.    She is taking Imodium for the diarrhea and wants to know if this is ok.

## 2023-02-06 RX ORDER — ELUXADOLINE 75 MG/1
75 TABLET, FILM COATED ORAL 2 TIMES DAILY
Qty: 60 TABLET | Refills: 0 | Status: SHIPPED | OUTPATIENT
Start: 2023-02-06 | End: 2023-03-08

## 2023-02-06 NOTE — TELEPHONE ENCOUNTER
Ok.  I sent in Rx for Viberzi for her to try. She should not use Imodium while taking this.  I gave her a 30-day supply to try.

## 2023-03-22 ENCOUNTER — TELEPHONE (OUTPATIENT)
Dept: INTERNAL MEDICINE | Facility: CLINIC | Age: 63
End: 2023-03-22
Payer: COMMERCIAL

## 2023-03-22 NOTE — TELEPHONE ENCOUNTER
Fluticasone 50mcg nasal spray not covered  insurance prefers fluticasone propionate,flunisolide, mometasonefuroate etc.  Please advise.

## 2023-03-23 RX ORDER — MOMETASONE FUROATE 50 UG/1
1-2 SPRAY, METERED NASAL DAILY
Qty: 17 G | Refills: 11 | Status: SHIPPED | OUTPATIENT
Start: 2023-03-23 | End: 2023-07-05 | Stop reason: SDUPTHER

## 2023-04-13 ENCOUNTER — TELEPHONE (OUTPATIENT)
Dept: ENDOSCOPY | Facility: HOSPITAL | Age: 63
End: 2023-04-13
Payer: COMMERCIAL

## 2023-04-13 NOTE — TELEPHONE ENCOUNTER
----- Message from Pan Montoya sent at 4/13/2023  2:20 PM CDT -----  Contact: pt  Pt requesting call back RE: would like to schedule fu nothing available in epic    Confirmed contact below:  Contact Name:Val Horvath  Phone Number: [phone]

## 2023-06-13 ENCOUNTER — TELEPHONE (OUTPATIENT)
Dept: ENDOSCOPY | Facility: HOSPITAL | Age: 63
End: 2023-06-13
Payer: COMMERCIAL

## 2023-06-13 NOTE — TELEPHONE ENCOUNTER
----- Message from Richelle Cha sent at 6/13/2023 12:46 PM CDT -----  Regarding: Appt Access  Contact: [phone]  Pt is requesting an f/u appt for IBS on 07/19/23-07/24/23.  Next avail is 09/05/23.  Pt declined stating she wants an appt during her vacation time off in 07/2023.  Please call.

## 2023-06-15 ENCOUNTER — TELEPHONE (OUTPATIENT)
Dept: GASTROENTEROLOGY | Facility: CLINIC | Age: 63
End: 2023-06-15
Payer: COMMERCIAL

## 2023-06-15 NOTE — TELEPHONE ENCOUNTER
Spoke w/pt regarding message below. Pt will call our office back on tomorrow because she couldn't remember which med she was calling about and thank me for calling.   ----- Message from Cris Quijano sent at 6/15/2023 11:46 AM CDT -----  Contact: @[phone]  Pt is calling in to discuss the last medication that was given to her. Please call to discuss further.

## 2023-06-16 RX ORDER — ELUXADOLINE 75 MG/1
75 TABLET, FILM COATED ORAL 2 TIMES DAILY
Qty: 180 TABLET | Refills: 0 | Status: SHIPPED | OUTPATIENT
Start: 2023-06-16 | End: 2023-09-14

## 2023-06-16 RX ORDER — ALPRAZOLAM 0.25 MG/1
0.25 TABLET ORAL 2 TIMES DAILY PRN
Qty: 60 TABLET | Refills: 3 | Status: SHIPPED | OUTPATIENT
Start: 2023-06-16 | End: 2023-10-12 | Stop reason: SDUPTHER

## 2023-06-16 NOTE — TELEPHONE ENCOUNTER
----- Message from Zelalem CRESPO Route sent at 6/16/2023  8:39 AM CDT -----  Contact: pt.[phone]  Pt is calling in ref to conversation with [employer] . She says her medication is Viperzi 75 mg per tab 60 tablets. Patient Requesting Call Back @ [phone]        [facility] #[id number] [address] AT [address] [address]  Phone: [phone] Fax: [phone]

## 2023-06-16 NOTE — TELEPHONE ENCOUNTER
----- Message from Nay Mosher sent at 6/16/2023  3:18 PM CDT -----  Regarding: Rx refill  Please refill the medication(s) listed below.Please call the patient when the prescription(s) is ready for  at this phone number        Medication #1 ALPRAZolam (XANAX) 0.25 MG tablet    Medication #2  Medication #3      Preferred Pharmacy:     [facility] #[id number] [address] AT [facility]       Would the patient rather a call back or a response via [facility]? no    Best Call Back Number:.Telephone Information:  Mobile          [phone]        Additional Information: Pt stated pharmacy sent over the request 2 days ago

## 2023-06-16 NOTE — TELEPHONE ENCOUNTER
Care Due:                  Date            Visit Type   Department     Provider  --------------------------------------------------------------------------------                                EP -                              PRIMARY      [facility] INTERNAL  Last Visit: 05-      CARE ([facility])   MEDICINE       [place]  Janice                              EP -                              PRIMARY      [facility] INTERNAL  Next Visit: 07-      CARE ([facility])   [facility]                                                            Last  Test          Frequency    Reason                     Performed    Due Date  --------------------------------------------------------------------------------    CBC.........  12 months..  valACYclovir.............  Not Found    Overdue    Cr..........  12 months..  valACYclovir.............  06- 06-    [facility] Embedded Care Due Messages. Reference number: [id number].   6/16/2023 3:48:42 PM CDT

## 2023-07-05 ENCOUNTER — TELEPHONE (OUTPATIENT)
Dept: INTERNAL MEDICINE | Facility: CLINIC | Age: 63
End: 2023-07-05
Payer: COMMERCIAL

## 2023-07-05 DIAGNOSIS — J30.9 ALLERGIC RHINITIS, UNSPECIFIED SEASONALITY, UNSPECIFIED TRIGGER: ICD-10-CM

## 2023-07-05 RX ORDER — FLUTICASONE PROPIONATE 50 MCG
1 SPRAY, SUSPENSION (ML) NASAL 2 TIMES DAILY
COMMUNITY
Start: 2023-07-03 | End: 2023-07-05

## 2023-07-05 RX ORDER — MOMETASONE FUROATE 50 UG/1
1-2 SPRAY, METERED NASAL DAILY
Qty: 17 G | Refills: 11 | Status: SHIPPED | OUTPATIENT
Start: 2023-07-05

## 2023-07-05 NOTE — TELEPHONE ENCOUNTER
Drug Change Request:   Fluticasone 50mcg, Cost of Medication    Alternative: Mometasone Furoate    LRF: 3/23/2023    LOV: 5/31/2022    RTC: 7/19/2023

## 2023-07-19 ENCOUNTER — OFFICE VISIT (OUTPATIENT)
Dept: INTERNAL MEDICINE | Facility: CLINIC | Age: 63
End: 2023-07-19
Payer: COMMERCIAL

## 2023-07-19 VITALS
RESPIRATION RATE: 12 BRPM | TEMPERATURE: 98 F | BODY MASS INDEX: 27.13 KG/M2 | DIASTOLIC BLOOD PRESSURE: 64 MMHG | WEIGHT: 138.88 LBS | SYSTOLIC BLOOD PRESSURE: 98 MMHG | HEART RATE: 99 BPM | OXYGEN SATURATION: 96 %

## 2023-07-19 DIAGNOSIS — G89.29 CHRONIC LOW BACK PAIN WITHOUT SCIATICA, UNSPECIFIED BACK PAIN LATERALITY: ICD-10-CM

## 2023-07-19 DIAGNOSIS — J30.2 CHRONIC SEASONAL ALLERGIC RHINITIS: ICD-10-CM

## 2023-07-19 DIAGNOSIS — F41.1 GENERALIZED ANXIETY DISORDER WITH PANIC ATTACKS: ICD-10-CM

## 2023-07-19 DIAGNOSIS — Z00.00 ANNUAL PHYSICAL EXAM: Primary | ICD-10-CM

## 2023-07-19 DIAGNOSIS — K58.0 IRRITABLE BOWEL SYNDROME WITH DIARRHEA: ICD-10-CM

## 2023-07-19 DIAGNOSIS — D32.9 MENINGIOMA: ICD-10-CM

## 2023-07-19 DIAGNOSIS — M54.50 CHRONIC LOW BACK PAIN WITHOUT SCIATICA, UNSPECIFIED BACK PAIN LATERALITY: ICD-10-CM

## 2023-07-19 DIAGNOSIS — F41.0 GENERALIZED ANXIETY DISORDER WITH PANIC ATTACKS: ICD-10-CM

## 2023-07-19 DIAGNOSIS — Z12.31 ENCOUNTER FOR SCREENING MAMMOGRAM FOR BREAST CANCER: ICD-10-CM

## 2023-07-19 PROCEDURE — 1159F PR MEDICATION LIST DOCUMENTED IN MEDICAL RECORD: ICD-10-PCS | Mod: CPTII,S$GLB,, | Performed by: HOSPITALIST

## 2023-07-19 PROCEDURE — 1160F RVW MEDS BY RX/DR IN RCRD: CPT | Mod: CPTII,S$GLB,, | Performed by: HOSPITALIST

## 2023-07-19 PROCEDURE — 99396 PR PREVENTIVE VISIT,EST,40-64: ICD-10-PCS | Mod: S$GLB,,, | Performed by: HOSPITALIST

## 2023-07-19 PROCEDURE — 99999 PR PBB SHADOW E&M-EST. PATIENT-LVL V: CPT | Mod: PBBFAC,,, | Performed by: HOSPITALIST

## 2023-07-19 PROCEDURE — 1160F PR REVIEW ALL MEDS BY PRESCRIBER/CLIN PHARMACIST DOCUMENTED: ICD-10-PCS | Mod: CPTII,S$GLB,, | Performed by: HOSPITALIST

## 2023-07-19 PROCEDURE — 3074F PR MOST RECENT SYSTOLIC BLOOD PRESSURE < 130 MM HG: ICD-10-PCS | Mod: CPTII,S$GLB,, | Performed by: HOSPITALIST

## 2023-07-19 PROCEDURE — 1159F MED LIST DOCD IN RCRD: CPT | Mod: CPTII,S$GLB,, | Performed by: HOSPITALIST

## 2023-07-19 PROCEDURE — 3078F DIAST BP <80 MM HG: CPT | Mod: CPTII,S$GLB,, | Performed by: HOSPITALIST

## 2023-07-19 PROCEDURE — 3074F SYST BP LT 130 MM HG: CPT | Mod: CPTII,S$GLB,, | Performed by: HOSPITALIST

## 2023-07-19 PROCEDURE — 3008F PR BODY MASS INDEX (BMI) DOCUMENTED: ICD-10-PCS | Mod: CPTII,S$GLB,, | Performed by: HOSPITALIST

## 2023-07-19 PROCEDURE — 3078F PR MOST RECENT DIASTOLIC BLOOD PRESSURE < 80 MM HG: ICD-10-PCS | Mod: CPTII,S$GLB,, | Performed by: HOSPITALIST

## 2023-07-19 PROCEDURE — 99999 PR PBB SHADOW E&M-EST. PATIENT-LVL V: ICD-10-PCS | Mod: PBBFAC,,, | Performed by: HOSPITALIST

## 2023-07-19 PROCEDURE — 3008F BODY MASS INDEX DOCD: CPT | Mod: CPTII,S$GLB,, | Performed by: HOSPITALIST

## 2023-07-19 PROCEDURE — 99396 PREV VISIT EST AGE 40-64: CPT | Mod: S$GLB,,, | Performed by: HOSPITALIST

## 2023-07-19 RX ORDER — CYCLOBENZAPRINE HCL 5 MG
5 TABLET ORAL NIGHTLY PRN
Qty: 30 TABLET | Refills: 5 | Status: SHIPPED | OUTPATIENT
Start: 2023-07-19

## 2023-07-19 NOTE — PROGRESS NOTES
Subjective:     @Patient ID: Val Horvath is a 62 y.o. female.    Chief Complaint: Annual Exam    HPI    63 yo F with anxiety, seasonal alleries, IBS, GERD, family hx of breast ca, hiatal hernia, diverticulosis, meningioma presents for annual.       Reports having cough today with green sputum. Started taking zyrtec and flonase.   Endorses lower back pain due to her having to wear security belt     Lipid disorders/ASCVD risk (ages >/= 45 or >/= 20 if increased risk ): ordered  DM (>45y yearly or if obese, HTN): A1c ordered  Breast Cancer (40-50y discretion of pt, 50-74y every 1-2 years): due in August   Cervical Cancer (Pap Smear ages 21-65 every 3 years or Pap + HPV q5 years after 30 years of age): reports done with  Dr. Darrell Balderrama    Colorectal Cancer (normal risk 50-75yr): Colonoscopy  Follows with GI Dr Davenport. Done 12/2020         Vaccines:   Influenza (yearly): n/a  Tetanus (every 10 yrs - 1st tdap) done 1/2018  PPSV23(>64yo or <65 w/ lung dz, smoking, DM): n/a   Zoster (>59yo) utd   Covid19: due for booster         Exercise: biking, walking   Diet: regular    Review of Systems   Constitutional:  Negative for chills and fever.   HENT:  Negative for congestion and sore throat.    Eyes:  Negative for pain and visual disturbance.   Respiratory:  Positive for cough. Negative for shortness of breath.    Cardiovascular:  Negative for chest pain and leg swelling.   Gastrointestinal:  Negative for abdominal pain, nausea and vomiting.   Endocrine: Negative for polydipsia and polyuria.   Genitourinary:  Negative for difficulty urinating and dysuria.   Musculoskeletal:  Positive for back pain. Negative for arthralgias.   Skin:  Negative for rash and wound.   Neurological:  Negative for dizziness, weakness and headaches.   Psychiatric/Behavioral:  Negative for agitation and confusion.    Past medical history, surgical history, and family medical history reviewed and updated as appropriate.    Medications and  allergies reviewed.     Objective:     There were no vitals filed for this visit.  There is no height or weight on file to calculate BMI.  Physical Exam  Vitals reviewed.   Constitutional:       General: She is not in acute distress.     Appearance: She is well-developed.   HENT:      Head: Normocephalic and atraumatic.      Right Ear: Tympanic membrane normal.      Left Ear: Tympanic membrane normal.      Mouth/Throat:      Mouth: Mucous membranes are moist.      Pharynx: No oropharyngeal exudate.   Eyes:      General:         Right eye: No discharge.         Left eye: No discharge.      Conjunctiva/sclera: Conjunctivae normal.   Cardiovascular:      Rate and Rhythm: Normal rate and regular rhythm.      Heart sounds: No murmur heard.    No friction rub.   Pulmonary:      Effort: Pulmonary effort is normal.      Breath sounds: Normal breath sounds.   Abdominal:      General: Bowel sounds are normal. There is no distension.      Palpations: Abdomen is soft.      Tenderness: There is no abdominal tenderness. There is no guarding.   Musculoskeletal:         General: Normal range of motion.      Cervical back: Normal range of motion and neck supple.      Right lower leg: No edema.      Left lower leg: No edema.   Lymphadenopathy:      Cervical: No cervical adenopathy.   Skin:     General: Skin is warm and dry.   Neurological:      Mental Status: She is alert and oriented to person, place, and time.   Psychiatric:         Mood and Affect: Mood normal.         Behavior: Behavior normal.       Lab Results   Component Value Date    WBC 5.2 06/11/2022    HGB 13.5 06/11/2022    HCT 41.9 06/11/2022     06/11/2022    CHOL 196 06/11/2022    TRIG 95 06/11/2022    HDL 72 06/11/2022    ALT 11 06/11/2022    AST 16 06/11/2022     06/11/2022    K 4.2 06/11/2022     06/11/2022    CREATININE 0.61 06/11/2022    BUN 14 06/11/2022    CO2 27 06/11/2022    TSH 0.92 06/11/2022    HGBA1C 5.4 06/11/2022       Assessment:      1. Annual physical exam    2. Generalized anxiety disorder with panic attacks    3. Chronic seasonal allergic rhinitis    4. Irritable bowel syndrome with diarrhea    5. Encounter for screening mammogram for breast cancer    6. Meningioma    7. Chronic low back pain without sciatica, unspecified back pain laterality      Plan:   Val was seen today for annual exam.    Diagnoses and all orders for this visit:    Annual physical exam  -     Comprehensive Metabolic Panel; Future  -     CBC Auto Differential; Future  -     Lipid Panel; Future  -     TSH; Future  -     Urinalysis; Future  -     Hemoglobin A1C; Future  -     Comprehensive Metabolic Panel  -     CBC Auto Differential  -     Lipid Panel  -     TSH  -     Urinalysis  -     Hemoglobin A1C    Generalized anxiety disorder with panic attacks  - Stable. Continue home meds     Chronic seasonal allergic rhinitis  - Stable. Continue home meds     Irritable bowel syndrome with diarrhea  - chronic. Will f/u with GI     Encounter for screening mammogram for breast cancer  -     Mammo Digital Screening Bilat w/ Jimmy; Future    Meningioma  -     Ambulatory referral/consult to Neurosurgery; Future    Chronic low back pain  -     cyclobenzaprine (FLEXERIL) 5 MG tablet; Take 1 tablet (5 mg total) by mouth nightly as needed for Muscle spasms.             India Camacho MD  Internal Medicine    7/19/2023

## 2023-07-24 ENCOUNTER — TELEPHONE (OUTPATIENT)
Dept: NEUROSURGERY | Facility: CLINIC | Age: 63
End: 2023-07-24
Payer: COMMERCIAL

## 2023-07-24 NOTE — TELEPHONE ENCOUNTER
----- Message from Vale Carmen MA sent at 7/21/2023  4:14 PM CDT -----    ----- Message -----  From: Nay Mosher  Sent: 7/21/2023  11:42 AM CDT  To: Jan[facility] MARIE Staff    Name of Who is Calling:YESSICA LOZA [[id number]]        What is the request in detail: Pt called in regards to scheduling her 5yr follow up per pt she is late scheduling. Please advise       Can the clinic reply by [facility]:NO        What Number to Call Back if not in [facility]:.Telephone Information:  Mobile          [phone]

## 2023-07-24 NOTE — TELEPHONE ENCOUNTER
S/W pt. Informed her I had sent a message to her PCP to put in the order for the MRI. Once the order is in, we will schedule her appointment with Dr. Gustafson. Pt V/U and thanks.

## 2023-07-24 NOTE — TELEPHONE ENCOUNTER
----- Message from Deena Nolasco MA sent at 7/24/2023  7:07 AM CDT -----  Regarding: Referral  Hi All,    This patient has been referred to NS for h/o meningioma.  She has been seen by Janay in the past and his last clinic note recommended to follow up in five years.  Her last MRI B was in 2016.  Can we see about getting her back in for eval?    Thanks,  May

## 2023-07-24 NOTE — TELEPHONE ENCOUNTER
----- Message from Ghada Sheikh MA sent at 7/24/2023 11:57 AM CDT -----  Regarding: appt  Contact: pt [phone]  Pt is calling to speak with someone in provider office is asking for a return call for a 5 year  follow up  please call pt at  [phone]

## 2023-07-25 LAB
ALBUMIN SERPL-MCNC: 4.3 G/DL (ref 3.6–5.1)
ALBUMIN/GLOB SERPL: 1.8 (CALC) (ref 1–2.5)
ALP SERPL-CCNC: 69 U/L (ref 37–153)
ALT SERPL-CCNC: 12 U/L (ref 6–29)
APPEARANCE UR: CLEAR
AST SERPL-CCNC: 16 U/L (ref 10–35)
BASOPHILS # BLD AUTO: 52 CELLS/UL (ref 0–200)
BASOPHILS NFR BLD AUTO: 0.8 %
BILIRUB SERPL-MCNC: 0.5 MG/DL (ref 0.2–1.2)
BILIRUB UR QL STRIP: NEGATIVE
BUN SERPL-MCNC: 14 MG/DL (ref 7–25)
BUN/CREAT SERPL: NORMAL (CALC) (ref 6–22)
CALCIUM SERPL-MCNC: 9.3 MG/DL (ref 8.6–10.4)
CHLORIDE SERPL-SCNC: 109 MMOL/L (ref 98–110)
CHOLEST SERPL-MCNC: 197 MG/DL
CHOLEST/HDLC SERPL: 2.7 (CALC)
CO2 SERPL-SCNC: 28 MMOL/L (ref 20–32)
COLOR UR: YELLOW
CREAT SERPL-MCNC: 0.63 MG/DL (ref 0.5–1.05)
EGFR: 100 ML/MIN/1.73M2
EOSINOPHIL # BLD AUTO: 72 CELLS/UL (ref 15–500)
EOSINOPHIL NFR BLD AUTO: 1.1 %
ERYTHROCYTE [DISTWIDTH] IN BLOOD BY AUTOMATED COUNT: 12.7 % (ref 11–15)
GLOBULIN SER CALC-MCNC: 2.4 G/DL (CALC) (ref 1.9–3.7)
GLUCOSE SERPL-MCNC: 76 MG/DL (ref 65–99)
GLUCOSE UR QL STRIP: NEGATIVE
HBA1C MFR BLD: 5.5 % OF TOTAL HGB
HCT VFR BLD AUTO: 37.2 % (ref 35–45)
HDLC SERPL-MCNC: 72 MG/DL
HGB BLD-MCNC: 12.4 G/DL (ref 11.7–15.5)
HGB UR QL STRIP: NEGATIVE
KETONES UR QL STRIP: NEGATIVE
LDLC SERPL CALC-MCNC: 106 MG/DL (CALC)
LEUKOCYTE ESTERASE UR QL STRIP: NEGATIVE
LYMPHOCYTES # BLD AUTO: 1437 CELLS/UL (ref 850–3900)
LYMPHOCYTES NFR BLD AUTO: 22.1 %
MCH RBC QN AUTO: 29.7 PG (ref 27–33)
MCHC RBC AUTO-ENTMCNC: 33.3 G/DL (ref 32–36)
MCV RBC AUTO: 89.2 FL (ref 80–100)
MONOCYTES # BLD AUTO: 332 CELLS/UL (ref 200–950)
MONOCYTES NFR BLD AUTO: 5.1 %
NEUTROPHILS # BLD AUTO: 4609 CELLS/UL (ref 1500–7800)
NEUTROPHILS NFR BLD AUTO: 70.9 %
NITRITE UR QL STRIP: NEGATIVE
NONHDLC SERPL-MCNC: 125 MG/DL (CALC)
PH UR STRIP: 6.5 [PH] (ref 5–8)
PLATELET # BLD AUTO: 207 THOUSAND/UL (ref 140–400)
PMV BLD REES-ECKER: 10.4 FL (ref 7.5–12.5)
POTASSIUM SERPL-SCNC: 4.5 MMOL/L (ref 3.5–5.3)
PROT SERPL-MCNC: 6.7 G/DL (ref 6.1–8.1)
PROT UR QL STRIP: NEGATIVE
RBC # BLD AUTO: 4.17 MILLION/UL (ref 3.8–5.1)
SODIUM SERPL-SCNC: 144 MMOL/L (ref 135–146)
SP GR UR STRIP: 1.02 (ref 1–1.03)
TRIGL SERPL-MCNC: 94 MG/DL
TSH SERPL-ACNC: 1.83 MIU/L (ref 0.4–4.5)
WBC # BLD AUTO: 6.5 THOUSAND/UL (ref 3.8–10.8)

## 2023-07-26 ENCOUNTER — TELEPHONE (OUTPATIENT)
Dept: INTERNAL MEDICINE | Facility: CLINIC | Age: 63
End: 2023-07-26
Payer: COMMERCIAL

## 2023-07-26 DIAGNOSIS — D32.9 MENINGIOMA: Primary | ICD-10-CM

## 2023-07-26 NOTE — TELEPHONE ENCOUNTER
----- Message from Mariposa Rao LPN sent at 7/26/2023 11:03 AM CDT -----  Regarding: FW: MRI order  Please place MRI orders.  ----- Message -----  From: Carol Jama  Sent: 7/26/2023  10:43 AM CDT  To: Janice Osborne Staff  Subject: FW: MRI order                                      ----- Message -----  From: Mariposa Rao LPN  Sent: 7/24/2023   9:07 AM CDT  To: [facility] Referral Coordinators  Subject: FW: MRI order                                    Please place orders. thanks  ----- Message -----  From: Vale Carmen MA  Sent: 7/24/2023   8:50 AM CDT  To: Janice Osborne Staff  Subject: MRI order                                        Good morning! This patient is overdue for follow-up with Dr. Gustafson (neurosurgery) but because it's been so long, we cannot place the order for the MRI we require before the appointment. Would you be able to place the order for MRI brain w and w/o contrast? Thank you so much!

## 2023-07-27 ENCOUNTER — TELEPHONE (OUTPATIENT)
Dept: INTERNAL MEDICINE | Facility: CLINIC | Age: 63
End: 2023-07-27
Payer: COMMERCIAL

## 2023-07-27 DIAGNOSIS — J01.90 ACUTE BACTERIAL SINUSITIS: Primary | ICD-10-CM

## 2023-07-27 DIAGNOSIS — B96.89 ACUTE BACTERIAL SINUSITIS: Primary | ICD-10-CM

## 2023-07-27 RX ORDER — AMOXICILLIN AND CLAVULANATE POTASSIUM 875; 125 MG/1; MG/1
1 TABLET, FILM COATED ORAL EVERY 12 HOURS
Qty: 14 TABLET | Refills: 0 | Status: SHIPPED | OUTPATIENT
Start: 2023-07-27 | End: 2023-09-05

## 2023-07-27 NOTE — TELEPHONE ENCOUNTER
----- Message from Grisel De Anda sent at 7/27/2023  3:53 PM CDT -----  Contact: P [phone]  Requesting an RX refill or new RX.    Is this a refill or new RX:  New   Patient state Dr. Camacho Recommended and antibiotic but she does not get it.     RX name and strength Antibiotic         Pharmacy name and phone # (  [facility] #[id number] - [address] [address]  Phone: [phone] Fax: [phone]    Patient will like phone call from the nurse.

## 2023-07-27 NOTE — TELEPHONE ENCOUNTER
Per patient; Patient stated Dr. Camacho Recommended and antibiotic but she did not get it.     Please advise.

## 2023-07-28 ENCOUNTER — TELEPHONE (OUTPATIENT)
Dept: NEUROSURGERY | Facility: CLINIC | Age: 63
End: 2023-07-28
Payer: COMMERCIAL

## 2023-08-02 ENCOUNTER — TELEPHONE (OUTPATIENT)
Dept: INTERNAL MEDICINE | Facility: CLINIC | Age: 63
End: 2023-08-02
Payer: COMMERCIAL

## 2023-08-02 NOTE — TELEPHONE ENCOUNTER
----- Message from Nora Sanchez sent at 8/2/2023  2:54 PM CDT -----  Contact: [phone] Patient  2TESTRESULTS    Type: Test Results    What test was performed? Labs    Who ordered the test? Dr Camacho    When and where were the test performed?  [facility]  07/24/2023    Would you like response via [email or web address]: Please call patient with her test results. Thank you.     Comments:

## 2023-08-08 ENCOUNTER — HOSPITAL ENCOUNTER (OUTPATIENT)
Dept: RADIOLOGY | Facility: HOSPITAL | Age: 63
Discharge: HOME | End: 2023-08-08
Attending: HOSPITALIST
Payer: COMMERCIAL

## 2023-08-08 DIAGNOSIS — D32.9 MENINGIOMA: ICD-10-CM

## 2023-08-08 PROCEDURE — 25500020 PHARM REV CODE 255: Performed by: HOSPITALIST

## 2023-08-08 PROCEDURE — 70553 MRI BRAIN STEM W/O & W/DYE: CPT | Mod: 26,,, | Performed by: RADIOLOGY

## 2023-08-08 PROCEDURE — A9585 GADOBUTROL INJECTION: HCPCS | Performed by: HOSPITALIST

## 2023-08-08 PROCEDURE — 70553 MRI BRAIN STEM W/O & W/DYE: CPT | Mod: TC

## 2023-08-08 PROCEDURE — 70553 MRI BRAIN W WO CONTRAST: ICD-10-PCS | Mod: 26,,, | Performed by: RADIOLOGY

## 2023-08-08 RX ORDER — GADOBUTROL 604.72 MG/ML
7 INJECTION INTRAVENOUS
Status: COMPLETED | OUTPATIENT
Start: 2023-08-08 | End: 2023-08-08

## 2023-08-08 RX ADMIN — GADOBUTROL 7 ML: 604.72 INJECTION INTRAVENOUS at 05:08

## 2023-08-10 DIAGNOSIS — J30.9 ALLERGIC RHINITIS, UNSPECIFIED SEASONALITY, UNSPECIFIED TRIGGER: ICD-10-CM

## 2023-08-10 RX ORDER — CETIRIZINE HYDROCHLORIDE 10 MG/1
10 TABLET ORAL DAILY
Qty: 90 TABLET | Refills: 3 | Status: SHIPPED | OUTPATIENT
Start: 2023-08-10

## 2023-08-10 RX ORDER — ACYCLOVIR 400 MG/1
400 TABLET ORAL 2 TIMES DAILY
Qty: 180 TABLET | Refills: 3 | Status: SHIPPED | OUTPATIENT
Start: 2023-08-10 | End: 2023-09-28 | Stop reason: SDUPTHER

## 2023-08-10 NOTE — TELEPHONE ENCOUNTER
No care due was identified.  [facility] Embedded Care Due Messages. Reference number: [id number].   8/10/2023 1:09:42 PM CDT

## 2023-08-10 NOTE — TELEPHONE ENCOUNTER
----- Message from Rodney Howard sent at 8/10/2023 11:11 AM CDT -----  Contact: [phone] @ patient  Good morning patient wanted to let the doc that the pharmacy is waiting on her to approve patient med. Please give patient a call when this is done [phone]

## 2023-08-10 NOTE — TELEPHONE ENCOUNTER
Please please refills.  Zovirax--the patient having an outbreak  Zyrtec-- the patient having sinus issues

## 2023-08-25 ENCOUNTER — HOSPITAL ENCOUNTER (OUTPATIENT)
Dept: RADIOLOGY | Facility: OTHER | Age: 63
Discharge: HOME | End: 2023-08-25
Attending: HOSPITALIST
Payer: COMMERCIAL

## 2023-08-25 DIAGNOSIS — Z12.31 ENCOUNTER FOR SCREENING MAMMOGRAM FOR BREAST CANCER: ICD-10-CM

## 2023-08-25 PROCEDURE — 77067 SCR MAMMO BI INCL CAD: CPT | Mod: 26,,, | Performed by: RADIOLOGY

## 2023-08-25 PROCEDURE — 77067 MAMMO DIGITAL SCREENING BILAT WITH TOMO: ICD-10-PCS | Mod: 26,,, | Performed by: RADIOLOGY

## 2023-08-25 PROCEDURE — 77067 SCR MAMMO BI INCL CAD: CPT | Mod: TC

## 2023-08-25 PROCEDURE — 77063 MAMMO DIGITAL SCREENING BILAT WITH TOMO: ICD-10-PCS | Mod: 26,,, | Performed by: RADIOLOGY

## 2023-08-25 PROCEDURE — 77063 BREAST TOMOSYNTHESIS BI: CPT | Mod: 26,,, | Performed by: RADIOLOGY

## 2023-09-05 ENCOUNTER — OFFICE VISIT (OUTPATIENT)
Dept: GASTROENTEROLOGY | Facility: CLINIC | Age: 63
End: 2023-09-05
Payer: COMMERCIAL

## 2023-09-05 ENCOUNTER — OFFICE VISIT (OUTPATIENT)
Dept: NEUROSURGERY | Facility: CLINIC | Age: 63
End: 2023-09-05
Payer: COMMERCIAL

## 2023-09-05 ENCOUNTER — TELEPHONE (OUTPATIENT)
Dept: ENDOSCOPY | Facility: HOSPITAL | Age: 63
End: 2023-09-05
Payer: COMMERCIAL

## 2023-09-05 VITALS
SYSTOLIC BLOOD PRESSURE: 104 MMHG | DIASTOLIC BLOOD PRESSURE: 75 MMHG | HEIGHT: 60 IN | DIASTOLIC BLOOD PRESSURE: 63 MMHG | BODY MASS INDEX: 28.61 KG/M2 | WEIGHT: 145.75 LBS | SYSTOLIC BLOOD PRESSURE: 120 MMHG | HEART RATE: 67 BPM | HEART RATE: 76 BPM

## 2023-09-05 VITALS — HEIGHT: 60 IN | WEIGHT: 145 LBS | BODY MASS INDEX: 28.47 KG/M2

## 2023-09-05 DIAGNOSIS — K58.0 IRRITABLE BOWEL SYNDROME WITH DIARRHEA: ICD-10-CM

## 2023-09-05 DIAGNOSIS — R10.10 UPPER ABDOMINAL PAIN: ICD-10-CM

## 2023-09-05 DIAGNOSIS — R10.9 ABDOMINAL PAIN, UNSPECIFIED ABDOMINAL LOCATION: ICD-10-CM

## 2023-09-05 DIAGNOSIS — R10.13 DYSPEPSIA: Primary | ICD-10-CM

## 2023-09-05 DIAGNOSIS — D32.9 MENINGIOMA: ICD-10-CM

## 2023-09-05 DIAGNOSIS — K44.9 HIATAL HERNIA: ICD-10-CM

## 2023-09-05 DIAGNOSIS — K21.9 GASTROESOPHAGEAL REFLUX DISEASE WITHOUT ESOPHAGITIS: ICD-10-CM

## 2023-09-05 PROCEDURE — 3008F PR BODY MASS INDEX (BMI) DOCUMENTED: ICD-10-PCS | Mod: CPTII,S$GLB,, | Performed by: INTERNAL MEDICINE

## 2023-09-05 PROCEDURE — 3074F SYST BP LT 130 MM HG: CPT | Mod: CPTII,S$GLB,, | Performed by: NEUROLOGICAL SURGERY

## 2023-09-05 PROCEDURE — 3074F PR MOST RECENT SYSTOLIC BLOOD PRESSURE < 130 MM HG: ICD-10-PCS | Mod: CPTII,S$GLB,, | Performed by: INTERNAL MEDICINE

## 2023-09-05 PROCEDURE — 3044F PR MOST RECENT HEMOGLOBIN A1C LEVEL <7.0%: ICD-10-PCS | Mod: CPTII,S$GLB,, | Performed by: NEUROLOGICAL SURGERY

## 2023-09-05 PROCEDURE — 3078F PR MOST RECENT DIASTOLIC BLOOD PRESSURE < 80 MM HG: ICD-10-PCS | Mod: CPTII,S$GLB,, | Performed by: INTERNAL MEDICINE

## 2023-09-05 PROCEDURE — 3074F PR MOST RECENT SYSTOLIC BLOOD PRESSURE < 130 MM HG: ICD-10-PCS | Mod: CPTII,S$GLB,, | Performed by: NEUROLOGICAL SURGERY

## 2023-09-05 PROCEDURE — 3044F PR MOST RECENT HEMOGLOBIN A1C LEVEL <7.0%: ICD-10-PCS | Mod: CPTII,S$GLB,, | Performed by: INTERNAL MEDICINE

## 2023-09-05 PROCEDURE — 3078F DIAST BP <80 MM HG: CPT | Mod: CPTII,S$GLB,, | Performed by: NEUROLOGICAL SURGERY

## 2023-09-05 PROCEDURE — 1160F RVW MEDS BY RX/DR IN RCRD: CPT | Mod: CPTII,S$GLB,, | Performed by: NEUROLOGICAL SURGERY

## 2023-09-05 PROCEDURE — 99214 OFFICE O/P EST MOD 30 MIN: CPT | Mod: S$GLB,,, | Performed by: INTERNAL MEDICINE

## 2023-09-05 PROCEDURE — 99214 PR OFFICE/OUTPT VISIT, EST, LEVL IV, 30-39 MIN: ICD-10-PCS | Mod: S$GLB,,, | Performed by: INTERNAL MEDICINE

## 2023-09-05 PROCEDURE — 99999 PR PBB SHADOW E&M-EST. PATIENT-LVL IV: CPT | Mod: PBBFAC,,, | Performed by: NEUROLOGICAL SURGERY

## 2023-09-05 PROCEDURE — 99999 PR PBB SHADOW E&M-EST. PATIENT-LVL IV: ICD-10-PCS | Mod: PBBFAC,,, | Performed by: NEUROLOGICAL SURGERY

## 2023-09-05 PROCEDURE — 3008F BODY MASS INDEX DOCD: CPT | Mod: CPTII,S$GLB,, | Performed by: INTERNAL MEDICINE

## 2023-09-05 PROCEDURE — 3044F HG A1C LEVEL LT 7.0%: CPT | Mod: CPTII,S$GLB,, | Performed by: NEUROLOGICAL SURGERY

## 2023-09-05 PROCEDURE — 3078F PR MOST RECENT DIASTOLIC BLOOD PRESSURE < 80 MM HG: ICD-10-PCS | Mod: CPTII,S$GLB,, | Performed by: NEUROLOGICAL SURGERY

## 2023-09-05 PROCEDURE — 99999 PR PBB SHADOW E&M-EST. PATIENT-LVL IV: ICD-10-PCS | Mod: PBBFAC,,, | Performed by: INTERNAL MEDICINE

## 2023-09-05 PROCEDURE — 3044F HG A1C LEVEL LT 7.0%: CPT | Mod: CPTII,S$GLB,, | Performed by: INTERNAL MEDICINE

## 2023-09-05 PROCEDURE — 3078F DIAST BP <80 MM HG: CPT | Mod: CPTII,S$GLB,, | Performed by: INTERNAL MEDICINE

## 2023-09-05 PROCEDURE — 1160F PR REVIEW ALL MEDS BY PRESCRIBER/CLIN PHARMACIST DOCUMENTED: ICD-10-PCS | Mod: CPTII,S$GLB,, | Performed by: NEUROLOGICAL SURGERY

## 2023-09-05 PROCEDURE — 99999 PR PBB SHADOW E&M-EST. PATIENT-LVL IV: CPT | Mod: PBBFAC,,, | Performed by: INTERNAL MEDICINE

## 2023-09-05 PROCEDURE — 1159F MED LIST DOCD IN RCRD: CPT | Mod: CPTII,S$GLB,, | Performed by: NEUROLOGICAL SURGERY

## 2023-09-05 PROCEDURE — 1159F PR MEDICATION LIST DOCUMENTED IN MEDICAL RECORD: ICD-10-PCS | Mod: CPTII,S$GLB,, | Performed by: NEUROLOGICAL SURGERY

## 2023-09-05 PROCEDURE — 3074F SYST BP LT 130 MM HG: CPT | Mod: CPTII,S$GLB,, | Performed by: INTERNAL MEDICINE

## 2023-09-05 PROCEDURE — 99204 OFFICE O/P NEW MOD 45 MIN: CPT | Mod: S$GLB,,, | Performed by: NEUROLOGICAL SURGERY

## 2023-09-05 PROCEDURE — 99204 PR OFFICE/OUTPT VISIT, NEW, LEVL IV, 45-59 MIN: ICD-10-PCS | Mod: S$GLB,,, | Performed by: NEUROLOGICAL SURGERY

## 2023-09-05 NOTE — TELEPHONE ENCOUNTER
"----- Message from Pieter Davenport MD sent at 2023 10:20 AM CDT -----  Regarding: EGD  Procedure: EGD    Diagnosis: Dyspepsia and upper abdominal pain    Procedure Timin-12 weeks    #If within 4 weeks selected, please roger as high priority#    #If greater than 12 weeks, please select "5-12 weeks" and delay sending until 2 months prior to requested date#     Provider: Myself    Location: [facility] 2-Endo and [facility] 4-Endo    Additional Scheduling Information: No scheduling concerns    Prep Specifications:Standard prep    Is the patient taking a GLP-1 Agonist:no    Have you attached a patient to this message: yes      "

## 2023-09-05 NOTE — PROGRESS NOTES
[facility] Established Patient Visit    Reason for Visit:    Chief Complaint   Patient presents with    Follow-up       PCP: India Camacho      HPI:  Val Horvath is a 62 y.o. female here for follow-up of IBS and GERD.  I last saw her in January of this year for the same.  I changed omeprazole to Protonix; however, she never picked this up.  She denies heartburn at this time.  I prescribed her Viberzi which she has used on occasion; however, it has caused constipation.  She only uses it as needed.  I gave her a course of rifaximin; however, this caused side effects prompting her to stop taking it.  She also has been using Bentyl, which helps abdominal pain and cramping; however, this causes diarrhea.  She uses it about 1 day out of the week.  She is still having loose stools about every other day.  She uses Imodium 2 days out of the month.  Food triggers include fried foods, eggs, and dairy.  She is also reporting intermittent upper abdominal discomfort.          ROS:  Constitutional: No fevers, chills, No weight loss, normal appetite  GI: see HPI        PMHX:  has a past medical history of Anxiety, AR (allergic rhinitis) (3/10/2014), Diverticulosis (2010), Family history of breast cancer in sister, Genital herpes, Hiatal hernia (2010), Irritable bowel syndrome, Meningioma (3/10/2014), and Post herpetic neuralgia (1/21/2014).    PSHX:  has a past surgical history that includes Oophorectomy (Right, 1970s); Carpal tunnel release (Right, 1990s); and Colonoscopy (N/A, 12/2/2020).    The patient's social and family histories were reviewed by me and updated in the appropriate section of the electronic medical record.    Review of patient's allergies indicates:   Allergen Reactions    Codeine Itching    Sulfa (sulfonamide antibiotics) Itching       Prior to Admission medications    Medication Sig Start Date End Date Taking? Authorizing Provider   acyclovir (ZOVIRAX) 400 MG tablet Take 1  tablet (400 mg total) by mouth 2 (two) times daily. 8/10/23  Yes India Camahco MD   ALPRAZolam (XANAX) 0.25 MG tablet Take 1 tablet (0.25 mg total) by mouth 2 (two) times daily as needed for Anxiety. 6/16/23  Yes India Camacho MD   cetirizine (ZYRTEC) 10 MG tablet Take 1 tablet (10 mg total) by mouth once daily. 8/10/23  Yes India Camacho MD   cyclobenzaprine (FLEXERIL) 5 MG tablet Take 1 tablet (5 mg total) by mouth nightly as needed for Muscle spasms. 7/19/23  Yes India Camacho MD   dicyclomine (BENTYL) 10 MG capsule Take 1 capsule (10 mg total) by mouth 4 (four) times daily before meals and nightly. 9/15/22  Yes Pieter Davenport MD   eluxadoline (VIBERZI) 75 mg Tab Take 75 mg by mouth 2 (two) times daily. 6/16/23 9/14/23 Yes Pieter Davenport MD   gabapentin (NEURONTIN) 300 MG capsule TAKE UP TO 6 CAPSULES BY MOUTH EVERY DAY 10/13/22  Yes Hailee Maloney MD   mometasone (NASONEX) 50 mcg/actuation nasal spray 1-2 sprays by Nasal route once daily. 7/5/23  Yes India Camacho MD   pantoprazole (PROTONIX) 40 MG tablet TAKE 1 TABLET(40 MG) BY MOUTH EVERY DAY  Patient not taking: Reported on 9/5/2023 1/30/23   Pieter Davenport MD   TRUE METRIX GLUCOSE TEST STRIP Strp CHECK BLOOD SUGAR AT THE TIME OF SYMPTOMS. CALL BACK IF BLOOD SUGAR IS LOW( BELOW 70) FOR FURTHER INSTRUCTION  Patient not taking: Reported on 9/5/2023 7/11/22   Davi Leal MD   TRUEPLUS LANCETS 33 gauge Misc USE TO CHECK BLOOD SUGAR AT TIME OF SYMPTOMS. CALL BACK IF BLOOD SUGAR IS LOW( BELOW 70)  Patient not taking: Reported on 9/5/2023 7/11/22   Davi Leal MD   amoxicillin-clavulanate 875-125mg (AUGMENTIN) 875-125 mg per tablet Take 1 tablet by mouth every 12 (twelve) hours. 7/27/23 9/5/23  India Camacho MD         Objective Findings:  Vital Signs:  /75   Pulse 67   Ht 5' (1.524 m)   Wt 66.1 kg (145 lb 11.6 oz)   LMP 01/01/2000 (Within Months)   BMI 28.46 kg/m²  Body mass index is 28.46  kg/m².      Physical Exam:  General Appearance:  Well appearing in no acute distress, appears stated age          Labs:  Lab Results   Component Value Date    WBC 6.5 07/24/2023    HGB 12.4 07/24/2023    HCT 37.2 07/24/2023    MCV 89.2 07/24/2023    RDW 12.7 07/24/2023     07/24/2023    LYMPH 1,437 07/24/2023    LYMPH 22.1 07/24/2023    MONO 332 07/24/2023    MONO 5.1 07/24/2023    EOS 72 07/24/2023    BASO 52 07/24/2023     Lab Results   Component Value Date     07/24/2023    K 4.5 07/24/2023     07/24/2023    CO2 28 07/24/2023    GLU 76 07/24/2023    BUN 14 07/24/2023    CREATININE 0.63 07/24/2023    CALCIUM 9.3 07/24/2023    PROT 6.7 07/24/2023    ALBUMIN 4.3 07/24/2023    BILITOT 0.5 07/24/2023    ALKPHOS 74 05/23/2020    AST 16 07/24/2023    ALT 12 07/24/2023                     Assessment:  Val Horvath is a 62 y.o. female here with:  1. Dyspepsia    2. Upper abdominal pain    3. Irritable bowel syndrome with diarrhea    4. Gastroesophageal reflux disease without esophagitis    5. Hiatal hernia      Irritable bowel syndrome with diarrhea and worsening of symptoms last year that are ongoing associated with both bloating, gas, and cramping relieved by bowel movements.  Imodium helps somewhat, but not providing full relief.  She is tried multiple remedies in the past.  Rifaximin cause side effects preventing completion of therapy.  Probiotics did not provide lasting relief.  IBgard helps somewhat, but did not provide lasting relief.  Viberzi helps, but occasionally cause constipation.  Bentyl helps pain, but causes diarrhea.  Symptoms are highly food related with items such as fried foods, eggs, and dairy.  She is also having upper abdominal discomfort.     Also with intermittent GERD and has had side effects to omeprazole with worsened diarrhea.  Over-the-counter antacids only providing modest relief.  Prescribed Protonix, but she never picked it up.  She is not on any medications at  this time, and reports minimal heartburn symptoms.      Recommendations:  1. Ultrasound of the gallbladder  2. I will check a celiac serology  3. Arrange for upper EGD      Follow-up following results of above.  If EGD and ultrasound unremarkable, may need HIDA scan with CCK.      Order summary:  Orders Placed This Encounter    US Abdomen Limited (GALLBLADDER)    Celiac Disease Panel           Pieter Davenport MD

## 2023-09-05 NOTE — PROGRESS NOTES
Subjective:   I, Teri Granados, attest that this documentation has been prepared under the direction and in the presence of Dmitry Gustafson MD.     Patient ID: Val Horvath is a 62 y.o. female     Chief Complaint: No chief complaint on file.      HPI  Ms. Val Horvath is a 62 y.o. woman with a meningioma who presents today for follow up with MRI brain. At the time of our last clinic visit on 8/17/2023, the pt reports she is doing well. She does complain of intermittent chest pain. She is unsure if it is from postnasal drip or nerve pain. She has seen her doctor and states she has not been prescribed an antibiotic. She recently went to urgent care for left eye redness. She complains of intermittent headaches and right-sided sinus pressure. She normally suffers with pain in the evenings. The patient has continued to take Gabapentin daily.     Today the pt reports she continues to do well in the interim. No headaches or other neurological deficits reported at this time.    Review of Systems   Constitutional:  Negative for activity change, appetite change, fatigue, fever and unexpected weight change.   HENT:  Negative for facial swelling.    Eyes: Negative.    Respiratory: Negative.     Cardiovascular: Negative.    Gastrointestinal:  Negative for diarrhea, nausea and vomiting.   Endocrine: Negative.    Genitourinary: Negative.    Musculoskeletal:  Negative for back pain, joint swelling, myalgias and neck pain.   Neurological:  Negative for dizziness, seizures, weakness, numbness and headaches.   Psychiatric/Behavioral: Negative.          Past Medical History:   Diagnosis Date    Anxiety     AR (allergic rhinitis) 3/10/2014    Diverticulosis 2010    Family history of breast cancer in sister     Genital herpes     Hiatal hernia 2010    Irritable bowel syndrome     Meningioma 3/10/2014    MRI 2/14    Post herpetic neuralgia 1/21/2014 1/14        Objective:      Vitals:    09/05/23 0908   BP: 104/63   Pulse: 76       Physical Exam  Constitutional:       General: She is not in acute distress.     Appearance: Normal appearance.   HENT:      Head: Normocephalic and atraumatic.   Pulmonary:      Effort: Pulmonary effort is normal.   Musculoskeletal:      Cervical back: Neck supple.   Neurological:      Mental Status: She is alert and oriented to person, place, and time.      GCS: GCS eye subscore is 4. GCS verbal subscore is 5. GCS motor subscore is 6.      Cranial Nerves: No cranial nerve deficit.          IMAGING:  MRI Brain W WO Contrast (8/8/2023):  Stable small extra-axial mass in the left cerebellopontine angle presumably meningioma.  No detrimental change when compared to prior study.    I have personally reviewed the images with the pt.      I, Dr. Dmitry Gustafson, personally performed the services described in this documentation. All medical record entries made by the scribe, Teri Granados, were at my direction and in my presence.  I have reviewed the chart and agree that the record reflects my personal performance and is accurate and complete. Dmitry Gustafson MD. 09/05/2023    Assessment:       1. Meningioma         Plan:   I have personally reviewed the MRI brain with the pt which shows stable small extra-axial mass in the left cerebellopontine angle presumably meningioma.  No detrimental change when compared to prior study.    I will schedule the patient for 5 year follow up with MRI brain.

## 2023-09-05 NOTE — PROGRESS NOTES
GENERAL GI PATIENT INTAKE:    COVID symptoms in the last 7 days (runny nose, sore throat, congestion, cough, fever): No  PCP: India Camacho  If not PCP-  number given to establish [phone]: N/A    ALLERGIES REVIEWED:  Yes    CHIEF COMPLAINT:    Chief Complaint   Patient presents with    Follow-up       VITAL SIGNS:  /75   Pulse 67   Ht 5' (1.524 m)   Wt 66.1 kg (145 lb 11.6 oz)   LMP 01/01/2000 (Within Months)   BMI 28.46 kg/m²      Change in medical, surgical, family or social history: No      REVIEWED MEDICATION LIST RECONCILED INCLUDING ABOVE MEDS:  Yes

## 2023-09-05 NOTE — TELEPHONE ENCOUNTER
Spoke to Val to schedule procedure(s) Upper Endoscopy (EGD)       Physician to perform procedure(s) Dr. NICK Davenport  Date of Procedure (s) 12/1/23  Arrival Time 1:15 PM  Time of Procedure(s) 2:15 PM   Location of Procedure(s) [address]  Type of Rx Prep sent to patient: Other  Instructions provided to patient via [facility]    Patient was informed on the following information and verbalized understanding. Screening questionnaire reviewed with patient and complete. If procedure requires anesthesia, a responsible adult needs to be present to accompany the patient home, patient cannot drive after receiving anesthesia. Appointment details are tentative, especially check-in time. Patient will receive a prep-op call 4 days prior to confirm check-in time for procedure. If applicable the patient should contact their pharmacy to verify Rx for procedure prep is ready for pick-up. Patient was advised to call the scheduling department at [phone] if pharmacy states no Rx is available. Patient was advised to call the endoscopy scheduling department if any questions or concerns arise.       Endoscopy Scheduling Department

## 2023-09-05 NOTE — PATIENT INSTRUCTIONS
I have personally reviewed the MRI brain with the pt which shows stable small extra-axial mass in the left cerebellopontine angle presumably meningioma.  No detrimental change when compared to prior study.    I will schedule the patient for 5 year follow up with MRI brain.

## 2023-09-26 ENCOUNTER — TELEPHONE (OUTPATIENT)
Dept: ENDOSCOPY | Facility: HOSPITAL | Age: 63
End: 2023-09-26
Payer: COMMERCIAL

## 2023-09-26 NOTE — TELEPHONE ENCOUNTER
----- Message from Becca Avila sent at 9/26/2023  8:30 AM CDT -----  Regarding: Questions and concerns  Contact: [phone]  Type:  Needs Medical Advice    Who Called: Val  Would the patient rather a call back or a response via [facility]? Call  Best Call Back Number: [phone]  Additional Information: Patient has a question about her u/s scheduled for 9/28 and not being able to eat..

## 2023-09-28 ENCOUNTER — OFFICE VISIT (OUTPATIENT)
Dept: OBSTETRICS AND GYNECOLOGY | Facility: CLINIC | Age: 63
End: 2023-09-28
Payer: COMMERCIAL

## 2023-09-28 ENCOUNTER — TELEPHONE (OUTPATIENT)
Dept: OBSTETRICS AND GYNECOLOGY | Facility: CLINIC | Age: 63
End: 2023-09-28
Payer: COMMERCIAL

## 2023-09-28 ENCOUNTER — HOSPITAL ENCOUNTER (OUTPATIENT)
Dept: RADIOLOGY | Facility: HOSPITAL | Age: 63
Discharge: HOME | End: 2023-09-28
Attending: INTERNAL MEDICINE
Payer: COMMERCIAL

## 2023-09-28 VITALS
BODY MASS INDEX: 28.31 KG/M2 | SYSTOLIC BLOOD PRESSURE: 112 MMHG | HEIGHT: 60 IN | DIASTOLIC BLOOD PRESSURE: 70 MMHG | WEIGHT: 144.19 LBS

## 2023-09-28 DIAGNOSIS — R10.13 DYSPEPSIA: ICD-10-CM

## 2023-09-28 DIAGNOSIS — Z01.419 WELL WOMAN EXAM WITH ROUTINE GYNECOLOGICAL EXAM: Primary | ICD-10-CM

## 2023-09-28 DIAGNOSIS — Z12.4 SCREENING FOR CERVICAL CANCER: ICD-10-CM

## 2023-09-28 DIAGNOSIS — R10.10 UPPER ABDOMINAL PAIN: ICD-10-CM

## 2023-09-28 PROCEDURE — 3008F PR BODY MASS INDEX (BMI) DOCUMENTED: ICD-10-PCS | Mod: CPTII,S$GLB,, | Performed by: OBSTETRICS & GYNECOLOGY

## 2023-09-28 PROCEDURE — 3044F PR MOST RECENT HEMOGLOBIN A1C LEVEL <7.0%: ICD-10-PCS | Mod: CPTII,S$GLB,, | Performed by: OBSTETRICS & GYNECOLOGY

## 2023-09-28 PROCEDURE — 87624 HPV HI-RISK TYP POOLED RSLT: CPT | Performed by: OBSTETRICS & GYNECOLOGY

## 2023-09-28 PROCEDURE — 1160F PR REVIEW ALL MEDS BY PRESCRIBER/CLIN PHARMACIST DOCUMENTED: ICD-10-PCS | Mod: CPTII,S$GLB,, | Performed by: OBSTETRICS & GYNECOLOGY

## 2023-09-28 PROCEDURE — 1160F RVW MEDS BY RX/DR IN RCRD: CPT | Mod: CPTII,S$GLB,, | Performed by: OBSTETRICS & GYNECOLOGY

## 2023-09-28 PROCEDURE — 76705 ECHO EXAM OF ABDOMEN: CPT | Mod: 26,,, | Performed by: RADIOLOGY

## 2023-09-28 PROCEDURE — 3078F PR MOST RECENT DIASTOLIC BLOOD PRESSURE < 80 MM HG: ICD-10-PCS | Mod: CPTII,S$GLB,, | Performed by: OBSTETRICS & GYNECOLOGY

## 2023-09-28 PROCEDURE — 99386 PR PREVENTIVE VISIT,NEW,40-64: ICD-10-PCS | Mod: S$GLB,,, | Performed by: OBSTETRICS & GYNECOLOGY

## 2023-09-28 PROCEDURE — 99999 PR PBB SHADOW E&M-EST. PATIENT-LVL III: CPT | Mod: PBBFAC,,, | Performed by: OBSTETRICS & GYNECOLOGY

## 2023-09-28 PROCEDURE — 88175 CYTOPATH C/V AUTO FLUID REDO: CPT | Performed by: OBSTETRICS & GYNECOLOGY

## 2023-09-28 PROCEDURE — 1159F PR MEDICATION LIST DOCUMENTED IN MEDICAL RECORD: ICD-10-PCS | Mod: CPTII,S$GLB,, | Performed by: OBSTETRICS & GYNECOLOGY

## 2023-09-28 PROCEDURE — 3008F BODY MASS INDEX DOCD: CPT | Mod: CPTII,S$GLB,, | Performed by: OBSTETRICS & GYNECOLOGY

## 2023-09-28 PROCEDURE — 3074F PR MOST RECENT SYSTOLIC BLOOD PRESSURE < 130 MM HG: ICD-10-PCS | Mod: CPTII,S$GLB,, | Performed by: OBSTETRICS & GYNECOLOGY

## 2023-09-28 PROCEDURE — 3078F DIAST BP <80 MM HG: CPT | Mod: CPTII,S$GLB,, | Performed by: OBSTETRICS & GYNECOLOGY

## 2023-09-28 PROCEDURE — 76705 US ABDOMEN LIMITED: ICD-10-PCS | Mod: 26,,, | Performed by: RADIOLOGY

## 2023-09-28 PROCEDURE — 3044F HG A1C LEVEL LT 7.0%: CPT | Mod: CPTII,S$GLB,, | Performed by: OBSTETRICS & GYNECOLOGY

## 2023-09-28 PROCEDURE — 99999 PR PBB SHADOW E&M-EST. PATIENT-LVL III: ICD-10-PCS | Mod: PBBFAC,,, | Performed by: OBSTETRICS & GYNECOLOGY

## 2023-09-28 PROCEDURE — 1159F MED LIST DOCD IN RCRD: CPT | Mod: CPTII,S$GLB,, | Performed by: OBSTETRICS & GYNECOLOGY

## 2023-09-28 PROCEDURE — 76705 ECHO EXAM OF ABDOMEN: CPT | Mod: TC

## 2023-09-28 PROCEDURE — 3074F SYST BP LT 130 MM HG: CPT | Mod: CPTII,S$GLB,, | Performed by: OBSTETRICS & GYNECOLOGY

## 2023-09-28 PROCEDURE — 99386 PREV VISIT NEW AGE 40-64: CPT | Mod: S$GLB,,, | Performed by: OBSTETRICS & GYNECOLOGY

## 2023-09-28 RX ORDER — ACYCLOVIR 400 MG/1
400 TABLET ORAL 2 TIMES DAILY
Qty: 180 TABLET | Refills: 3 | Status: SHIPPED | OUTPATIENT
Start: 2023-09-28

## 2023-09-28 NOTE — PROGRESS NOTES
SUBJECTIVE:   63 y.o. female   for annual routine Pap and checkup. Patient's last menstrual period was 2000 (within months)..  She has no unusual complaints.        Past Medical History:   Diagnosis Date    Anxiety     AR (allergic rhinitis) 3/10/2014    Diverticulosis     Family history of breast cancer in sister     Genital herpes     Hiatal hernia     Irritable bowel syndrome     Meningioma 3/10/2014    MRI     Post herpetic neuralgia 2014      Past Surgical History:   Procedure Laterality Date    CARPAL TUNNEL RELEASE Right     COLONOSCOPY N/A 2020    Procedure: COLONOSCOPY;  Surgeon: Pieter Davenport MD;  Location: [facility] ([address] [place] [address]);  Service: Endoscopy;  Laterality: N/A;  patient due for colonoscopy in 2020  prep ins. emailed  and mailed - COVID screening on 20 [facility]  patient confirmed new arrival time of 0900-BB    OOPHORECTOMY Right      Social History     Socioeconomic History    Marital status: Single    Number of children: 1   Occupational History    Occupation: Works Security     Employer: [employer]   Tobacco Use    Smoking status: Never    Smokeless tobacco: Never   Substance and Sexual Activity    Alcohol use: Yes     Comment: rare    Drug use: Never   Social History Narrative    The patient does exercise regularly (bikes QW-BIW).      She is not satisfied with weight.    Rates diet as good.    She does drink at least 1/2 gallon water daily.    She drinks 0 coffee/tea/caffeine-containing soft drinks daily.    Total sleep time at night is 8 hours.    She works 40 hours per week.    She does wear seat belts.    Hobbies include none.     Family History   Problem Relation Age of Onset    Heart disease Mother 65        MI    Hypertension Mother     Kidney disease Mother     Bladder Cancer Father         bladder    Breast cancer Sister 45    No Known Problems Brother     Cancer Paternal Uncle         colon  cancer      OB History    Para Term  AB Living   1 1 1     1   SAB IAB Ectopic Multiple Live Births           1      # Outcome Date GA Lbr Abdullahi/2nd Weight Sex Delivery Anes PTL Lv   1 Term 81    M Vag-Spont   JAMIE      Obstetric Comments   Menarche age 13.  LMP age 50.   First child born age 21.   History of abnormal PAP smear: NO.   History of abnormal mammogram: NO.   History of sexually transmitted disease:  YES: Herpes.            Current Outpatient Medications   Medication Sig Dispense Refill    acyclovir (ZOVIRAX) 400 MG tablet Take 1 tablet (400 mg total) by mouth 2 (two) times daily. 180 tablet 3    ALPRAZolam (XANAX) 0.25 MG tablet Take 1 tablet (0.25 mg total) by mouth 2 (two) times daily as needed for Anxiety. 60 tablet 3    cetirizine (ZYRTEC) 10 MG tablet Take 1 tablet (10 mg total) by mouth once daily. 90 tablet 3    cyclobenzaprine (FLEXERIL) 5 MG tablet Take 1 tablet (5 mg total) by mouth nightly as needed for Muscle spasms. 30 tablet 5    gabapentin (NEURONTIN) 300 MG capsule TAKE UP TO 6 CAPSULES BY MOUTH EVERY  capsule 11    mometasone (NASONEX) 50 mcg/actuation nasal spray 1-2 sprays by Nasal route once daily. 17 g 11    pantoprazole (PROTONIX) 40 MG tablet TAKE 1 TABLET(40 MG) BY MOUTH EVERY DAY 30 tablet 0     No current facility-administered medications for this visit.     Allergies: Codeine and Sulfa (sulfonamide antibiotics)     ROS:  Constitutional: no weight loss, weight gain, fever, fatigue  Eyes:  No vision changes, glasses/contacts  ENT/Mouth: No ulcers, sinus problems, ears ringing, headache  Cardiovascular: No inability to lie flat, chest pain, exercise intolerance, swelling, heart palpitations  Respiratory: No wheezing, coughing blood, shortness of breath, or cough  Gastrointestinal: No diarrhea, bloody stool, nausea/vomiting, constipation, gas, hemorrhoids  Genitourinary: No blood in urine, painful urination, urgency of urination, frequency of urination,  incomplete emptying, incontinence, abnormal bleeding, painful periods, heavy periods, vaginal discharge, vaginal odor, painful intercourse, sexual problems, bleeding after intercourse.  Musculoskeletal: No muscle weakness  Skin/Breast: No painful breasts, nipple discharge, masses, rash, ulcers  Neurological: No passing out, seizures, numbness, headache  Endocrine: No diabetes, hypothyroid, hyperthyroid, hot flashes, hair loss, abnormal hair growth, ance  Psychiatric: No depression, crying  Hematologic: No bruises, bleeding, swollen lymph nodes, anemia.      OBJECTIVE:   The patient appears well, alert, oriented x 3, in no distress.  /70 (BP Location: Right arm, Patient Position: Sitting)   Ht 5' (1.524 m)   Wt 65.4 kg (144 lb 2.9 oz)   LMP 01/01/2000 (Within Months)   BMI 28.16 kg/m²   NECK: no thyromegaly, trachea midline  SKIN: no acne, striae, hirsutism  BREAST EXAM: breasts appear normal, no suspicious masses, no skin or nipple changes or axillary nodes  ABDOMEN: soft, non-tender; bowel sounds normal; no masses,  no organomegaly and no hernias, masses, or hepatosplenomegaly  GENITALIA: normal external genitalia, no erythema, no discharge  URETHRA: normal appearing urethra with no masses, tenderness or lesions and normal urethra, normal urethral meatus  VAGINA: Normal  CERVIX: no lesions or cervical motion tenderness  UTERUS: normal size, contour, position, consistency, mobility, non-tender  ADNEXA: no mass, fullness, tenderness    \  ASSESSMENT:   Blade was seen today for annual exam.    Diagnoses and all orders for this visit:    Well woman exam with routine gynecological exam  MMG up to date  Screening for cervical cancer  -     HPV High Risk Genotypes, PCR  -     Liquid-Based Pap Smear, Screening

## 2023-09-28 NOTE — TELEPHONE ENCOUNTER
----- Message from Olga Strong sent at 9/28/2023 10:47 AM CDT -----  Regarding: Call back requested  Contact: [phone]  Hi, pt called to ask could she come in early today for the appt. Pt says she is available now, if possible. Pls call the pt at [phone] to spk with the pt.

## 2023-10-04 LAB
HPV HR 12 DNA SPEC QL NAA+PROBE: NEGATIVE
HPV16 AG SPEC QL: NEGATIVE
HPV18 DNA SPEC QL NAA+PROBE: NEGATIVE

## 2023-10-06 LAB
FINAL PATHOLOGIC DIAGNOSIS: NORMAL
Lab: NORMAL

## 2023-10-12 ENCOUNTER — TELEPHONE (OUTPATIENT)
Dept: ENDOSCOPY | Facility: HOSPITAL | Age: 63
End: 2023-10-12
Payer: COMMERCIAL

## 2023-10-12 NOTE — TELEPHONE ENCOUNTER
----- Message from Dani Ross sent at 10/12/2023 11:48 AM CDT -----  Regarding: Procedure arrival time 12/1  Contact: @ [phone]  Pt is calling to speak to someone in the office to, discuss procedure arrival time on 12/1. Pt states that they have not heard from anyone in the office. Please call to advise. Thanks.         Call Back or Sent message thru the [facility] Portal? Both

## 2023-10-12 NOTE — TELEPHONE ENCOUNTER
No care due was identified.  [facility] Embedded Care Due Messages. Reference number: [id number].   10/12/2023 12:06:12 PM CDT

## 2023-10-12 NOTE — TELEPHONE ENCOUNTER
----- Message from Dani Ross sent at 10/12/2023 11:52 AM CDT -----  Regarding: Rx refill request  Contact: @ [phone]  Pt is calling to speak to someone in the office to request a refill to be sent to the pharmacy. Please call to advise. Thanks.       Refill Needed: ALPRAZolam (XANAX) 0.25 MG tablet      Pharmacy:   [facility] #[id number] [address] [address]  Phone: [phone] Fax: [phone]

## 2023-10-13 RX ORDER — ALPRAZOLAM 0.25 MG/1
0.25 TABLET ORAL 2 TIMES DAILY PRN
Qty: 60 TABLET | Refills: 3 | Status: SHIPPED | OUTPATIENT
Start: 2023-10-13 | End: 2024-02-07 | Stop reason: SDUPTHER

## 2023-11-07 ENCOUNTER — TELEPHONE (OUTPATIENT)
Dept: INTERNAL MEDICINE | Facility: CLINIC | Age: 63
End: 2023-11-07
Payer: COMMERCIAL

## 2023-11-07 NOTE — TELEPHONE ENCOUNTER
Spoke to patient and the only nasal spray she has on record is mometasone (nasonex) and she soul have refill on that med .  She will call her pharmacy to verified

## 2023-11-07 NOTE — TELEPHONE ENCOUNTER
----- Message from Grisel De Anda sent at 11/7/2023  3:22 PM CST -----  Contact: [phone]  Patient is requesting a phone call regarding: Medication refill. Patient does not has the name of the medication. Patient state it is a nasal spray.

## 2023-11-13 ENCOUNTER — TELEPHONE (OUTPATIENT)
Dept: INTERNAL MEDICINE | Facility: CLINIC | Age: 63
End: 2023-11-13
Payer: COMMERCIAL

## 2023-11-13 NOTE — TELEPHONE ENCOUNTER
The patient informed to schedule a visit with the first provider or go to urgent care.  The patient verbalized understanding.

## 2023-11-13 NOTE — TELEPHONE ENCOUNTER
Lvm that the pt needs to be seen to receive treatment, by urgent care or she can call the office to schedule an appointment with any available provider.

## 2023-11-13 NOTE — TELEPHONE ENCOUNTER
----- Message from Marianna Guerrero sent at 11/13/2023  8:07 AM CST -----  Contact: [phone]  Per pt, she has yellow phlegm coming up and would like to have something called in. Pt is using   [facility] #[id number] [address] AT [address] [address]  Phone: [phone] Fax: [phone]      Pt is requesting a call from the nurse as soon as possible in regards to this matter.           Thank you

## 2023-11-13 NOTE — TELEPHONE ENCOUNTER
Spoke with pt, she c/o yellow phlegm with cough for 3 days no fever, no SOB, no chest congestion and states an antibiotic usually helps her.

## 2023-11-14 ENCOUNTER — OFFICE VISIT (OUTPATIENT)
Dept: URGENT CARE | Facility: CLINIC | Age: 63
End: 2023-11-14
Payer: COMMERCIAL

## 2023-11-14 VITALS
RESPIRATION RATE: 18 BRPM | BODY MASS INDEX: 28.31 KG/M2 | DIASTOLIC BLOOD PRESSURE: 56 MMHG | OXYGEN SATURATION: 100 % | WEIGHT: 144.19 LBS | SYSTOLIC BLOOD PRESSURE: 107 MMHG | TEMPERATURE: 98 F | HEIGHT: 60 IN | HEART RATE: 77 BPM

## 2023-11-14 DIAGNOSIS — G89.29 CHRONIC LEFT HIP PAIN: Primary | ICD-10-CM

## 2023-11-14 DIAGNOSIS — B96.89 ACUTE BACTERIAL BRONCHITIS: ICD-10-CM

## 2023-11-14 DIAGNOSIS — R05.1 ACUTE COUGH: ICD-10-CM

## 2023-11-14 DIAGNOSIS — M25.552 CHRONIC LEFT HIP PAIN: Primary | ICD-10-CM

## 2023-11-14 DIAGNOSIS — J20.8 ACUTE BACTERIAL BRONCHITIS: ICD-10-CM

## 2023-11-14 LAB
CTP QC/QA: YES
SARS-COV-2 AG RESP QL IA.RAPID: NEGATIVE

## 2023-11-14 PROCEDURE — 87811 SARS-COV-2 COVID19 W/OPTIC: CPT | Mod: QW,S$GLB,,

## 2023-11-14 PROCEDURE — 99213 PR OFFICE/OUTPT VISIT, EST, LEVL III, 20-29 MIN: ICD-10-PCS | Mod: 25,S$GLB,,

## 2023-11-14 PROCEDURE — 99213 OFFICE O/P EST LOW 20 MIN: CPT | Mod: 25,S$GLB,,

## 2023-11-14 PROCEDURE — 96372 THER/PROPH/DIAG INJ SC/IM: CPT | Mod: S$GLB,,,

## 2023-11-14 PROCEDURE — 96372 PR INJECTION,THERAP/PROPH/DIAG2ST, IM OR SUBCUT: ICD-10-PCS | Mod: S$GLB,,,

## 2023-11-14 PROCEDURE — 87811 SARS CORONAVIRUS 2 ANTIGEN POCT, MANUAL READ: ICD-10-PCS | Mod: QW,S$GLB,,

## 2023-11-14 RX ORDER — KETOROLAC TROMETHAMINE 30 MG/ML
30 INJECTION, SOLUTION INTRAMUSCULAR; INTRAVENOUS
Status: COMPLETED | OUTPATIENT
Start: 2023-11-14 | End: 2023-11-14

## 2023-11-14 RX ORDER — GUAIFENESIN 600 MG/1
1200 TABLET, EXTENDED RELEASE ORAL 2 TIMES DAILY PRN
Qty: 40 TABLET | Refills: 0 | Status: SHIPPED | OUTPATIENT
Start: 2023-11-14

## 2023-11-14 RX ORDER — FLUTICASONE PROPIONATE 50 MCG
SPRAY, SUSPENSION (ML) NASAL
COMMUNITY
Start: 2023-08-11 | End: 2024-02-07 | Stop reason: SDUPTHER

## 2023-11-14 RX ORDER — IBUPROFEN 600 MG/1
600 TABLET ORAL EVERY 8 HOURS PRN
Qty: 20 TABLET | Refills: 0 | Status: SHIPPED | OUTPATIENT
Start: 2023-11-14

## 2023-11-14 RX ORDER — AZITHROMYCIN 250 MG/1
TABLET, FILM COATED ORAL
Qty: 6 EACH | Refills: 0 | Status: SHIPPED | OUTPATIENT
Start: 2023-11-14 | End: 2023-11-19

## 2023-11-14 RX ADMIN — KETOROLAC TROMETHAMINE 30 MG: 30 INJECTION, SOLUTION INTRAMUSCULAR; INTRAVENOUS at 09:11

## 2023-11-14 NOTE — PROGRESS NOTES
Subjective:      Patient ID: Val Horvath is a 63 y.o. female.    Vitals:  height is 5' (1.524 m) and weight is 65.4 kg (144 lb 2.9 oz). Her temperature is 97.9 °F (36.6 °C). Her blood pressure is 107/56 (abnormal) and her pulse is 77. Her respiration is 18 and oxygen saturation is 100%.     Chief Complaint: Cough and Hip Pain    63-year-old female patient with history of chronic left hip pain (trochanteric bursitis) due to being a  and having her gun strap around her hips constantly and standing for long time reports of left hip pain flare up for the last week.  Patient states she takes gabapentin but does not work and has been applying a heating pad.  No injury or trauma reported.  Patient has full range of motion of the hip joint.  Patient also reports of a productive cough ongoing for the last week.  Patient has been taking Tessalon Perles with no relief.  Patient states cough is yellow and green in appearance.  Patient states she has been taking cetirizine and Flonase with no relief.  Patient denies fever, GI complaints, chest pain, shortness for breath, or any other associated symptoms.    Cough  This is a new problem. Episode onset: 4 days ago. The problem has been gradually improving. The cough is Productive of sputum. Pertinent negatives include no chest pain, chills, ear congestion, ear pain, fever, headaches, heartburn, hemoptysis, myalgias, nasal congestion, postnasal drip, rash, sore throat, shortness of breath, sweats, weight loss or wheezing. Nothing aggravates the symptoms. Treatments tried: theraflu, halls cough drops, zyrtec, and flonase spray. The treatment provided mild relief. There is no history of asthma, bronchiectasis, bronchitis, COPD, emphysema, environmental allergies or pneumonia.   Hip Pain   The incident occurred more than 1 week ago. The injury mechanism is unknown. The pain is present in the left hip. The pain is at a severity of 10/10. The pain is severe. The  pain has been Constant since onset. Pertinent negatives include no loss of sensation, numbness or tingling. She reports no foreign bodies present. The symptoms are aggravated by movement.       Constitution: Negative for activity change, appetite change, chills, sweating, fatigue and fever.   HENT:  Positive for congestion. Negative for ear pain, ear discharge, foreign body in ear, tinnitus, nosebleeds, foreign body in nose, postnasal drip, sinus pain, sinus pressure, sore throat, trouble swallowing and voice change.    Neck: Negative for neck pain, neck stiffness and painful lymph nodes.   Cardiovascular:  Negative for chest trauma, chest pain and leg swelling.   Eyes:  Negative for eye trauma, foreign body in eye, eye discharge, eye itching and eye pain.   Respiratory:  Positive for cough and sputum production. Negative for sleep apnea, chest tightness, bloody sputum, COPD, shortness of breath, stridor, wheezing and asthma.    Gastrointestinal:  Negative for abdominal trauma, abdominal pain, abdominal bloating, history of abdominal surgery, nausea and heartburn.   Endocrine: hair loss, cold intolerance, heat intolerance and excessive thirst.   Genitourinary:  Negative for dysuria, frequency, urgency, urine decreased, flank pain, bladder incontinence and bed wetting.   Musculoskeletal:  Positive for joint pain. Negative for pain, trauma, joint swelling, abnormal ROM of joint, arthritis, gout, back pain and muscle ache.        Left hip pain   Skin:  Negative for color change, pale, rash, wound and abrasion.   Allergic/Immunologic: Negative for environmental allergies, seasonal allergies, food allergies, eczema and asthma.   Neurological:  Negative for dizziness, history of vertigo, light-headedness, passing out, facial drooping, headaches, disorientation, altered mental status and numbness.   Hematologic/Lymphatic: Negative for swollen lymph nodes, easy bruising/bleeding, trouble clotting and history of blood clots.  Does not bruise/bleed easily.   Psychiatric/Behavioral:  Negative for altered mental status, disorientation, confusion, agitation, nervous/anxious, sleep disturbance and hallucinations. The patient is not nervous/anxious.       Objective:     Physical Exam   Constitutional: She is oriented to person, place, and time. She appears well-developed.   HENT:   Head: Normocephalic and atraumatic.   Ears:   Right Ear: External ear normal.   Left Ear: External ear normal.   Nose: Congestion present.   Mouth/Throat: Mucous membranes are normal.   Eyes: Conjunctivae and lids are normal.   Neck: Trachea normal. Neck supple.   Cardiovascular: Normal rate, regular rhythm and normal heart sounds.   Pulmonary/Chest: Effort normal and breath sounds normal. No stridor. No respiratory distress. She has no wheezes. She has no rhonchi. She has no rales. She exhibits no tenderness.   Abdominal: Normal appearance and bowel sounds are normal. She exhibits no distension and no mass. Soft. There is no abdominal tenderness.   Musculoskeletal: Normal range of motion.         General: Tenderness present. No swelling, deformity or signs of injury. Normal range of motion.      Right lower leg: No edema.      Left lower leg: No edema.      Comments: Left hip pain without radiation/ no sciatica   Neurological: She is alert and oriented to person, place, and time. She has normal strength.   Skin: Skin is warm, dry, intact, not diaphoretic and not pale.   Psychiatric: Her speech is normal and behavior is normal. Judgment and thought content normal.   Nursing note and vitals reviewed.    Results for orders placed or performed in visit on 11/14/23   SARS Coronavirus 2 Antigen, POCT Manual Read   Result Value Ref Range    SARS Coronavirus 2 Antigen Negative Negative     Acceptable Yes       Assessment:     1. Chronic left hip pain    2. Acute cough    3. Acute bacterial bronchitis        Plan:       Chronic left hip pain  -     ketorolac  injection 30 mg  -     ibuprofen (ADVIL,MOTRIN) 600 MG tablet; Take 1 tablet (600 mg total) by mouth every 8 (eight) hours as needed for Pain.  Dispense: 20 tablet; Refill: 0    Acute cough  -     SARS Coronavirus 2 Antigen, POCT Manual Read    Acute bacterial bronchitis  -     guaiFENesin (MUCINEX) 600 mg 12 hr tablet; Take 2 tablets (1,200 mg total) by mouth 2 (two) times daily as needed (cough).  Dispense: 40 tablet; Refill: 0  -     azithromycin (Z-WM) 250 MG tablet; Take 2 tablets by mouth on day 1; Take 1 tablet by mouth on days 2-5  Dispense: 6 each; Refill: 0             Additional MDM:     Heart Failure Score:   COPD = No

## 2023-11-14 NOTE — PATIENT INSTRUCTIONS
Take Z-Ezra as prescribed. Continue flonase and cetirizine daily for sinus. Take guafenesin every 12 hours for cough. Ibuprofen 3 times a day for hip pain.     What care is needed at home?   Call your regular doctor to let them know you were in the ED. Make a follow-up appointment if you were told to.  To help you feel better:  Use a cool mist humidifier to avoid breathing dry air.  Use hard candy or cough drops to soothe sore throat and cough.  Gargle with salt water (mix 1/2 teaspoon salt with 1 cup warm water) a few times a day.  Spray saltwater mist in each nostril. Any normal saline spray works.  Sip warm liquids to keep your throat moist.  Take warm, steamy showers to help soothe the cough.  Do not smoke or be in smoke-filled places. Avoid things that may cause breathing problems like vaping, fumes, pollution, dust, and other common allergens.  You may want to use over-the-counter medicines for allergies or acid reflux if your cough is due to one of these problems.  You can also use an over-the-counter cough medicine.  When do I need to get emergency help?   Return to the ED if:   You have chest pain when you cough or trouble breathing.  You start to cough up blood or yellow or green mucus.  When do I need to call the doctor?   You have a fever of 100.4°F (38°C) or higher or chills.  You cough so hard you throw up.  You are still coughing in 10 days.  You have new or worsening symptoms.  - Rest.    - Drink plenty of fluids.    - Acetaminophen (tylenol) or Ibuprofen (advil,motrin) as directed as needed for fever/pain. Avoid tylenol if you have a history of liver disease. Do not take ibuprofen if you have a history of GI bleeding, kidney disease, or if you take blood thinners.     - Follow up with your PCP or specialty clinic as directed in the next 1-2 weeks if not improved or as needed.  You can call [phone] to schedule an appointment with the appropriate provider.    - Go to the ER or seek medical  attention immediately if you develop new or worsening symptoms.     - You must understand that you have received an Urgent Care treatment only and that you may be released before all of your medical problems are known or treated.   - You, the patient, will arrange for follow up care as instructed.   - If your condition worsens or fails to improve we recommend that you receive another evaluation at the ER immediately or contact your PCP to discuss your concerns or return here.

## 2023-11-17 ENCOUNTER — TELEPHONE (OUTPATIENT)
Dept: INTERNAL MEDICINE | Facility: CLINIC | Age: 63
End: 2023-11-17
Payer: COMMERCIAL

## 2023-11-17 ENCOUNTER — TELEPHONE (OUTPATIENT)
Dept: NEUROLOGY | Facility: CLINIC | Age: 63
End: 2023-11-17
Payer: COMMERCIAL

## 2023-11-17 DIAGNOSIS — B02.29 HZV (HERPES ZOSTER VIRUS) POST HERPETIC NEURALGIA: ICD-10-CM

## 2023-11-17 DIAGNOSIS — B02.29 POST HERPETIC NEURALGIA: ICD-10-CM

## 2023-11-17 RX ORDER — GABAPENTIN 300 MG/1
CAPSULE ORAL
Qty: 180 CAPSULE | Refills: 11 | Status: SHIPPED | OUTPATIENT
Start: 2023-11-17

## 2023-11-17 NOTE — TELEPHONE ENCOUNTER
----- Message from Krystal Rizzo sent at 11/17/2023  2:17 PM CST -----  Contact: [phone]  Requesting an RX refill or new RX.  Is this a refill or new RX: REFILL   RX name and strength (copy/paste from chart):  gabapentin (NEURONTIN) 300 MG capsule  Is this a 30 day or 90 day RX: 30  Pharmacy name and phone # (copy/paste from chart):    [facility] #[id number] [address] [address]  Phone: [phone] Fax: [phone]    The doctors have asked that we provide their patients with the following 2 reminders -- prescription refills can take up to 72 hours, and a friendly reminder that in the future you can use your [facility] account to request refills: Y

## 2023-11-18 NOTE — TELEPHONE ENCOUNTER
----- Message from Diamone Speed sent at 11/17/2023 10:20 AM CST -----  Regarding: self      Type: RX Refill Request      Who Called: self     Have you contacted your pharmacy: yes       Refill or New Rx: refill       RX Name and Strength:gabapentin (NEURONTIN) 300 MG capsule             Preferred Pharmacy with phone number:   [email or web address] #[id number] - [address] [address]  Phone: [phone] Fax: [phone]    [address] - [address] [address]  Phone: [phone] Fax: [phone]          Local or Mail Order: local           Would the patient rather a call back or a response via [facility]? Call back       Best Call Back Number: [phone]        Additional Information: pt stated the phamr told her that they have a delay on the medition pt wants to know why and needs a call back

## 2023-11-28 ENCOUNTER — TELEPHONE (OUTPATIENT)
Dept: ENDOSCOPY | Facility: HOSPITAL | Age: 63
End: 2023-11-28
Payer: COMMERCIAL

## 2023-11-28 NOTE — TELEPHONE ENCOUNTER
Incoming call  Spoke to patient  Reason for the call: patient stated someone call her patient stead she already know what to do pt went over the instruction and confirm her date an time  Information confirmed:   Date of procedure:  12/1/2023  Arrival time: 1:15 pm  Procedure (s): EGD

## 2023-12-01 ENCOUNTER — ANESTHESIA (OUTPATIENT)
Dept: ENDOSCOPY | Facility: HOSPITAL | Age: 63
End: 2023-12-01
Payer: COMMERCIAL

## 2023-12-01 ENCOUNTER — ANESTHESIA EVENT (OUTPATIENT)
Dept: ENDOSCOPY | Facility: HOSPITAL | Age: 63
End: 2023-12-01
Payer: COMMERCIAL

## 2023-12-01 ENCOUNTER — HOSPITAL ENCOUNTER (OUTPATIENT)
Facility: HOSPITAL | Age: 63
Discharge: HOME | End: 2023-12-01
Attending: INTERNAL MEDICINE | Admitting: INTERNAL MEDICINE
Payer: COMMERCIAL

## 2023-12-01 VITALS
HEIGHT: 60 IN | TEMPERATURE: 98 F | SYSTOLIC BLOOD PRESSURE: 167 MMHG | BODY MASS INDEX: 28.47 KG/M2 | WEIGHT: 145 LBS | HEART RATE: 76 BPM | OXYGEN SATURATION: 100 % | RESPIRATION RATE: 24 BRPM | DIASTOLIC BLOOD PRESSURE: 73 MMHG

## 2023-12-01 DIAGNOSIS — B02.29 POST HERPETIC NEURALGIA: ICD-10-CM

## 2023-12-01 DIAGNOSIS — D32.9 MENINGIOMA: ICD-10-CM

## 2023-12-01 DIAGNOSIS — E16.2 HYPOGLYCEMIA: Chronic | ICD-10-CM

## 2023-12-01 DIAGNOSIS — Z80.3 FAMILY HISTORY OF BREAST CANCER IN SISTER: ICD-10-CM

## 2023-12-01 DIAGNOSIS — J30.2 CHRONIC SEASONAL ALLERGIC RHINITIS: ICD-10-CM

## 2023-12-01 DIAGNOSIS — A60.04 HERPES SIMPLEX VULVOVAGINITIS: ICD-10-CM

## 2023-12-01 DIAGNOSIS — R51.9 GENERALIZED HEADACHES: ICD-10-CM

## 2023-12-01 DIAGNOSIS — K57.90 DIVERTICULOSIS: ICD-10-CM

## 2023-12-01 DIAGNOSIS — K44.9 HIATAL HERNIA: ICD-10-CM

## 2023-12-01 DIAGNOSIS — M79.2 NEURALGIA: ICD-10-CM

## 2023-12-01 DIAGNOSIS — F41.0 GENERALIZED ANXIETY DISORDER WITH PANIC ATTACKS: ICD-10-CM

## 2023-12-01 DIAGNOSIS — R10.10 UPPER ABDOMINAL PAIN: Primary | ICD-10-CM

## 2023-12-01 DIAGNOSIS — F41.1 GENERALIZED ANXIETY DISORDER WITH PANIC ATTACKS: ICD-10-CM

## 2023-12-01 PROCEDURE — 25000003 PHARM REV CODE 250: Performed by: INTERNAL MEDICINE

## 2023-12-01 PROCEDURE — 88342 CHG IMMUNOCYTOCHEMISTRY: ICD-10-PCS | Mod: 26,,, | Performed by: STUDENT IN AN ORGANIZED HEALTH CARE EDUCATION/TRAINING PROGRAM

## 2023-12-01 PROCEDURE — 88305 TISSUE EXAM BY PATHOLOGIST: ICD-10-PCS | Mod: 26,,, | Performed by: STUDENT IN AN ORGANIZED HEALTH CARE EDUCATION/TRAINING PROGRAM

## 2023-12-01 PROCEDURE — 43239 PR EGD, FLEX, W/BIOPSY, SGL/MULTI: ICD-10-PCS | Mod: ,,, | Performed by: INTERNAL MEDICINE

## 2023-12-01 PROCEDURE — 37000008 HC ANESTHESIA 1ST 15 MINUTES: Performed by: INTERNAL MEDICINE

## 2023-12-01 PROCEDURE — E9220 PRA ENDO ANESTHESIA: HCPCS | Mod: ,,, | Performed by: NURSE ANESTHETIST, CERTIFIED REGISTERED

## 2023-12-01 PROCEDURE — 88305 TISSUE EXAM BY PATHOLOGIST: CPT | Performed by: STUDENT IN AN ORGANIZED HEALTH CARE EDUCATION/TRAINING PROGRAM

## 2023-12-01 PROCEDURE — 88342 IMHCHEM/IMCYTCHM 1ST ANTB: CPT | Mod: 26,,, | Performed by: STUDENT IN AN ORGANIZED HEALTH CARE EDUCATION/TRAINING PROGRAM

## 2023-12-01 PROCEDURE — 25000003 PHARM REV CODE 250: Performed by: NURSE ANESTHETIST, CERTIFIED REGISTERED

## 2023-12-01 PROCEDURE — 27201012 HC FORCEPS, HOT/COLD, DISP: Performed by: INTERNAL MEDICINE

## 2023-12-01 PROCEDURE — 43239 EGD BIOPSY SINGLE/MULTIPLE: CPT | Mod: ,,, | Performed by: INTERNAL MEDICINE

## 2023-12-01 PROCEDURE — 88342 IMHCHEM/IMCYTCHM 1ST ANTB: CPT | Performed by: STUDENT IN AN ORGANIZED HEALTH CARE EDUCATION/TRAINING PROGRAM

## 2023-12-01 PROCEDURE — 88305 TISSUE EXAM BY PATHOLOGIST: CPT | Mod: 26,,, | Performed by: STUDENT IN AN ORGANIZED HEALTH CARE EDUCATION/TRAINING PROGRAM

## 2023-12-01 PROCEDURE — 37000009 HC ANESTHESIA EA ADD 15 MINS: Performed by: INTERNAL MEDICINE

## 2023-12-01 PROCEDURE — 63600175 PHARM REV CODE 636 W HCPCS: Performed by: NURSE ANESTHETIST, CERTIFIED REGISTERED

## 2023-12-01 PROCEDURE — E9220 PRA ENDO ANESTHESIA: ICD-10-PCS | Mod: ,,, | Performed by: NURSE ANESTHETIST, CERTIFIED REGISTERED

## 2023-12-01 PROCEDURE — 43239 EGD BIOPSY SINGLE/MULTIPLE: CPT | Performed by: INTERNAL MEDICINE

## 2023-12-01 RX ORDER — SODIUM CHLORIDE 9 MG/ML
INJECTION, SOLUTION INTRAVENOUS CONTINUOUS
Status: DISCONTINUED | OUTPATIENT
Start: 2023-12-01 | End: 2023-12-01 | Stop reason: HOSPADM

## 2023-12-01 RX ORDER — PROPOFOL 10 MG/ML
VIAL (ML) INTRAVENOUS
Status: DISCONTINUED | OUTPATIENT
Start: 2023-12-01 | End: 2023-12-01

## 2023-12-01 RX ORDER — LIDOCAINE HYDROCHLORIDE 20 MG/ML
INJECTION INTRAVENOUS
Status: DISCONTINUED | OUTPATIENT
Start: 2023-12-01 | End: 2023-12-01

## 2023-12-01 RX ORDER — PROPOFOL 10 MG/ML
INJECTION, EMULSION INTRAVENOUS CONTINUOUS PRN
Status: DISCONTINUED | OUTPATIENT
Start: 2023-12-01 | End: 2023-12-01

## 2023-12-01 RX ADMIN — LIDOCAINE HYDROCHLORIDE 100 MG: 20 INJECTION INTRAVENOUS at 01:12

## 2023-12-01 RX ADMIN — PROPOFOL 200 MCG/KG/MIN: 10 INJECTION, EMULSION INTRAVENOUS at 01:12

## 2023-12-01 RX ADMIN — PROPOFOL 130 MG: 10 INJECTION, EMULSION INTRAVENOUS at 01:12

## 2023-12-01 RX ADMIN — SODIUM CHLORIDE: 0.9 INJECTION, SOLUTION INTRAVENOUS at 12:12

## 2023-12-01 RX ADMIN — GLYCOPYRROLATE 0.2 MG: 0.2 INJECTION, SOLUTION INTRAMUSCULAR; INTRAVENOUS at 01:12

## 2023-12-01 NOTE — ANESTHESIA POSTPROCEDURE EVALUATION
Anesthesia Post Evaluation    Patient: Val Horvath    Procedure(s) Performed: Procedure(s) (LRB):  EGD (ESOPHAGOGASTRODUODENOSCOPY) (N/A)    Final Anesthesia Type: general      Patient location during evaluation: GI PACU  Patient participation: Yes- Able to Participate  Level of consciousness: awake and alert  Post-procedure vital signs: reviewed and stable  Pain management: adequate  Airway patency: patent    PONV status at discharge: No PONV  Anesthetic complications: no      Cardiovascular status: blood pressure returned to baseline  Respiratory status: unassisted  Hydration status: euvolemic  Follow-up not needed.              Vitals Value Taken Time   /77 12/01/23 1425   Temp 36.6 °C (97.9 °F) 12/01/23 1410   Pulse 77 12/01/23 1425   Resp 15 12/01/23 1425   SpO2 100 % 12/01/23 1425         No case tracking events are documented in the log.      Pain/Kenneth Score: Kenneth Score: 9 (12/1/2023  2:10 PM)

## 2023-12-01 NOTE — H&P
Short Stay Endoscopy History and Physical    PCP - India Camacho MD    Procedure - EGD  ASA - per anesthesia  Mallampati - per anesthesia  History of Anesthesia problems - no  Family history Anesthesia problems -  no   Plan of anesthesia - MAC    HPI:  This is a 63 y.o. female here for evaluation of upper abdominal pain and history of GERD.         ROS:  Constitutional: No fevers, chills  CV: No chest pain  Pulm: No cough, No shortness of breath  Ophtho: No vision changes  GI: see HPI    Medical History:  has a past medical history of Anxiety, AR (allergic rhinitis) (3/10/2014), Diverticulosis (2010), Family history of breast cancer in sister, Genital herpes, Hiatal hernia (2010), Irritable bowel syndrome, Meningioma (3/10/2014), and Post herpetic neuralgia (1/21/2014).    Surgical History:  has a past surgical history that includes Oophorectomy (Right, 1970s); Carpal tunnel release (Right, 1990s); and Colonoscopy (N/A, 12/2/2020).    Family History: family history includes Bladder Cancer in her father; Breast cancer (age of onset: 45) in her sister; Cancer in her paternal uncle; Heart disease (age of onset: 65) in her mother; Hypertension in her mother; Kidney disease in her mother; No Known Problems in her brother.. Otherwise no colon cancer, inflammatory bowel disease, or GI malignancies.    Social History:  reports that she has never smoked. She has never been exposed to tobacco smoke. She has never used smokeless tobacco. She reports current alcohol use. She reports that she does not use drugs.    Review of patient's allergies indicates:   Allergen Reactions    Codeine Itching    Sulfa (sulfonamide antibiotics) Itching       Medications:   Medications Prior to Admission   Medication Sig Dispense Refill Last Dose    ALPRAZolam (XANAX) 0.25 MG tablet Take 1 tablet (0.25 mg total) by mouth 2 (two) times daily as needed for Anxiety. 60 tablet 3 11/30/2023    cetirizine (ZYRTEC) 10 MG tablet Take 1 tablet  "(10 mg total) by mouth once daily. 90 tablet 3 2023    gabapentin (NEURONTIN) 300 MG capsule TAKE UP TO 6 CAPSULES BY MOUTH EVERY  capsule 11 2023    ibuprofen (ADVIL,MOTRIN) 600 MG tablet Take 1 tablet (600 mg total) by mouth every 8 (eight) hours as needed for Pain. 20 tablet 0 2023    acyclovir (ZOVIRAX) 400 MG tablet Take 1 tablet (400 mg total) by mouth 2 (two) times daily. 180 tablet 3     cyclobenzaprine (FLEXERIL) 5 MG tablet Take 1 tablet (5 mg total) by mouth nightly as needed for Muscle spasms. 30 tablet 5     fluticasone propionate (FLONASE) 50 mcg/actuation nasal spray SMARTSI Spray(s) Both Nares Daily PRN       guaiFENesin (MUCINEX) 600 mg 12 hr tablet Take 2 tablets (1,200 mg total) by mouth 2 (two) times daily as needed (cough). 40 tablet 0     mometasone (NASONEX) 50 mcg/actuation nasal spray 1-2 sprays by Nasal route once daily. 17 g 11     pantoprazole (PROTONIX) 40 MG tablet TAKE 1 TABLET(40 MG) BY MOUTH EVERY DAY 30 tablet 0        Physical Exam:    Vital Signs:   Vitals:    23 1208   BP: (!) 120/56   Pulse: 67   Resp: 18   Temp: 97.7 °F (36.5 °C)       General Appearance: Well appearing in no acute distress  Eyes:    No scleral icterus  Lungs: CTA anteriorly  Heart:  Regular rate and rhythm   Abdomen: Soft, non tender, non distended with normal bowel sounds.      Labs:  Lab Results   Component Value Date    WBC 6.5 2023    HGB 12.4 2023    HCT 37.2 2023    MCV 89.2 2023     2023        BMP  Lab Results   Component Value Date     2023    K 4.5 2023     2023    CO2 28 2023    BUN 14 2023    CREATININE 0.63 2023    CALCIUM 9.3 2023    ESTGFRAFRICA 113 2022    EGFRNONAA 98 2022     No results found for: "INR", "PROTIME"       Assessment:  63 y.o. female with upper abdominal pain and history of GERD.     Plan:  Proceed with EGD today.  I have explained the risks and " benefits of endoscopy procedures to the patient including but not limited to bleeding, perforation, infection, and death.  All questions answered.      Pieter Davenport MD

## 2023-12-01 NOTE — ANESTHESIA PREPROCEDURE EVALUATION
[facility]  Anesthesia Pre-Operative Evaluation       Patient Name: Val Horvath  YOB: 1960  MRN: [id number]  [facility]: [id number]      Code Status: No Order   Date of Procedure: 12/1/2023  Anesthesia: Choice Procedure: Procedure(s) (LRB):  EGD (ESOPHAGOGASTRODUODENOSCOPY) (N/A)  Pre-Operative Diagnosis: Dyspepsia [R10.13]  Abdominal pain, unspecified abdominal location [R10.9]  Proceduralist: Surgeon(s) and Role:     * Pieter Davenport MD - Primary Registered Nurse: Michelle Gilmore RN; Lucille Renteria RN      SUBJECTIVE:   Val Horvath is a 63 y.o. female who  has a past medical history of Anxiety, AR (allergic rhinitis) (3/10/2014), Diverticulosis (2010), Family history of breast cancer in sister, Genital herpes, Hiatal hernia (2010), Irritable bowel syndrome, Meningioma (3/10/2014), and Post herpetic neuralgia (1/21/2014). No notes on file      Last had gatorade at 11:45 AM. Will have another patient go ahead of her so she will be appropriately NPO. Pt w a recent URI. Has residual non-productive dry cough but symptoms have resolved. S/p abx regimen. Currently satting well on room air in no respiratory distress, no increased WOB   Anticoagulants   Medication Route Frequency       she has a current medication list which includes the following long-term medication(s): alprazolam, cetirizine, gabapentin, acyclovir, and pantoprazole.   ALLERGIES:     Review of patient's allergies indicates:   Allergen Reactions    Codeine Itching    Sulfa (sulfonamide antibiotics) Itching     LDA:          Lines/Drains/Airways       Peripheral Intravenous Line  Duration                  Peripheral IV - Single Lumen 12/01/23 1210 22 G Posterior;Right Hand <1 day                  MEDICATIONS:     Antibiotics (From admission, onward)      None          VTE Risk Mitigation  (From admission, onward)      None          Current Facility-Administered Medications   Medication Dose Route Frequency Provider Last Rate Last Admin    0.9%  NaCl infusion   Intravenous Continuous Pieter Davenport MD              History:   There are no hospital problems to display for this patient.    Surgical History:    has a past surgical history that includes Oophorectomy (Right, 1970s); Carpal tunnel release (Right, 1990s); and Colonoscopy (N/A, 12/2/2020).   Social History:    has no history on file for sexual activity.  reports that she has never smoked. She has never been exposed to tobacco smoke. She has never used smokeless tobacco. She reports current alcohol use. She reports that she does not use drugs.     OBJECTIVE:     Vital Signs (Most Recent):  Temp: 36.5 °C (97.7 °F) (12/01/23 1208)  Pulse: 67 (12/01/23 1208)  Resp: 18 (12/01/23 1208)  BP: (!) 120/56 (12/01/23 1208)  SpO2: 100 % (12/01/23 1208) Vital Signs Range (Last 24H):  Temp:  [36.5 °C (97.7 °F)]   Pulse:  [67]   Resp:  [18]   BP: (120)/(56)   SpO2:  [100 %]        Body mass index is 28.32 kg/m².   Wt Readings from Last 4 Encounters:   12/01/23 65.8 kg (145 lb)   11/14/23 65.4 kg (144 lb 2.9 oz)   09/28/23 65.4 kg (144 lb 2.9 oz)   09/05/23 65.8 kg (145 lb)     Significant Labs:  Lab Results   Component Value Date    WBC 6.5 07/24/2023    HGB 12.4 07/24/2023    HCT 37.2 07/24/2023     07/24/2023     07/24/2023    K 4.5 07/24/2023     07/24/2023    CREATININE 0.63 07/24/2023    BUN 14 07/24/2023    CO2 28 07/24/2023    GLU 76 07/24/2023    CALCIUM 9.3 07/24/2023    ALKPHOS 74 05/23/2020    ALT 12 07/24/2023    AST 16 07/24/2023    ALBUMIN 4.3 07/24/2023    HGBA1C 5.5 07/24/2023     Patient's last menstrual period was 01/01/2000 (within months).  No results found for this or any previous visit (from the past 72 hour(s)).    EKG:   No results found for this or any previous visit.    TTE:  No results found for this or any  "previous visit.  No results found for: "EF"   No results found for this or any previous visit.  OLIVA:  No results found for this or any previous visit.  Stress Test:  No results found for this or any previous visit.     LHC:  No results found for this or any previous visit.     PFT:  No results found for: "FEV1", "FVC", "YGO9BCJ", "TLC", "DLCO"   ASSESSMENT/PLAN:          Pre-op Assessment    I have reviewed the Patient Summary Reports.     I have reviewed the Nursing Notes. I have reviewed the NPO Status.   I have reviewed the Medications.     Review of Systems  Anesthesia Hx:  No problems with previous Anesthesia                Social:  Non-Smoker, No Alcohol Use       Hematology/Oncology:  Hematology Normal   Oncology Normal                                   EENT/Dental:  EENT/Dental Normal           Cardiovascular:  Cardiovascular Normal Exercise tolerance: good                                           Pulmonary:       Recent URI, resolved                 Hepatic/GI:    Hiatal Hernia, GERD                 Physical Exam  General: Well nourished, Cooperative, Alert and Oriented    Airway:  Mallampati: II   Mouth Opening: Normal  TM Distance: Normal  Tongue: Normal  Neck ROM: Normal ROM    Dental:  Intact    Chest/Lungs:  Clear to auscultation  Dry cough   Heart:  Rate: Normal  Rhythm: Regular Rhythm        Anesthesia Plan  Type of Anesthesia, risks & benefits discussed:    Anesthesia Type: Gen Natural Airway  Intra-op Monitoring Plan: Standard ASA Monitors  Induction:  IV  ASA Score: 2    Ready For Surgery From Anesthesia Perspective.     .      "

## 2023-12-01 NOTE — TRANSFER OF CARE
Anesthesia Transfer of Care Note    Patient: Val Horvath    Procedure(s) Performed: Procedure(s) (LRB):  EGD (ESOPHAGOGASTRODUODENOSCOPY) (N/A)    Patient location: GI    Anesthesia Type: general    Transport from OR: Transported from OR on room air with adequate spontaneous ventilation    Post pain: adequate analgesia    Post assessment: no apparent anesthetic complications    Post vital signs: stable    Level of consciousness: responds to stimulation and sedated    Nausea/Vomiting: no nausea/vomiting    Complications: none    Transfer of care protocol was followed      Last vitals: Visit Vitals  /71   Pulse 85   Temp 97.7   Resp 21   Ht    Wt    LMP    SpO2 97%   BMI

## 2023-12-01 NOTE — PROVATION PATIENT INSTRUCTIONS
Discharge Summary/Instructions after an Endoscopic Procedure  Patient Name: Val Horvath  Patient MRN: [id number]  Patient YOB: 1960 Friday, December 1, 2023  Pieter Davenport MD  Dear patient,  As a result of recent federal legislation (The Federal Cures Act), you may   receive lab or pathology results from your procedure in your [facility]   account before your physician is able to contact you. Your physician or   their representative will relay the results to you with their   recommendations at their soonest availability.  Thank you,  RESTRICTIONS:  During your procedure today, you received medications for sedation.  These   medications may affect your judgment, balance and coordination.  Therefore,   for 24 hours, you have the following restrictions:   - DO NOT drive a car, operate machinery, make legal/financial decisions,   sign important papers or drink alcohol.    ACTIVITY:  Today: no heavy lifting, straining or running due to procedural   sedation/anesthesia.  The following day: return to full activity including work.  DIET:  Eat and drink normally unless instructed otherwise.     TREATMENT FOR COMMON SIDE EFFECTS:  - Mild abdominal pain, nausea, belching, bloating or excessive gas:  rest,   eat lightly and use a heating pad.  - Sore Throat: treat with throat lozenges and/or gargle with warm salt   water.  - Because air was used during the procedure, expelling large amounts of air   from your rectum or belching is normal.  - If a bowel prep was taken, you may not have a bowel movement for 1-3 days.    This is normal.  SYMPTOMS TO WATCH FOR AND REPORT TO YOUR PHYSICIAN:  1. Abdominal pain or bloating, other than gas cramps.  2. Chest pain.  3. Back pain.  4. Signs of infection such as: chills or fever occurring within 24 hours   after the procedure.  5. Rectal bleeding, which would show as bright red, maroon, or black stools.   (A tablespoon of blood from the rectum is not serious, especially  if   hemorrhoids are present.)  6. Vomiting.  7. Weakness or dizziness.  GO DIRECTLY TO THE NEAREST EMERGENCY ROOM IF YOU HAVE ANY OF THE FOLLOWING:      Difficulty breathing              Chills and/or fever over 101 F   Persistent vomiting and/or vomiting blood   Severe abdominal pain   Severe chest pain   Black, tarry stools   Bleeding- more than one tablespoon   Any other symptom or condition that you feel may need urgent attention  Your doctor recommends these additional instructions:  If any biopsies were taken, your doctors clinic will contact you in 1 to 2   weeks with any results.  - Discharge patient to home.   - Patient has a contact number available for emergencies.  The signs and   symptoms of potential delayed complications were discussed with the   patient.  Return to normal activities tomorrow.  Written discharge   instructions were provided to the patient.   - Resume previous diet.   - Continue present medications.   - Await pathology results.   - Avoid NSAIDs.  - Use Protonix (pantoprazole) 40 mg PO daily.   - Repeat upper endoscopy in 8 weeks to check healing.  For questions, problems or results please call your physician - Pieter Davenport MD at Work:  [phone].  [facility], EMERGENCY ROOM PHONE NUMBER: [phone]  IF A COMPLICATION OR EMERGENCY SITUATION ARISES AND YOU ARE UNABLE TO REACH   YOUR PHYSICIAN - GO DIRECTLY TO THE EMERGENCY ROOM.  Pieter Davenport MD  12/1/2023 2:08:19 PM  This report has been verified and signed electronically.  Dear patient,  As a result of recent federal legislation (The Federal Cures Act), you may   receive lab or pathology results from your procedure in your [facility]   account before your physician is able to contact you. Your physician or   their representative will relay the results to you with their   recommendations at their soonest availability.  Thank you,  PROVATION

## 2023-12-05 ENCOUNTER — TELEPHONE (OUTPATIENT)
Dept: ENDOSCOPY | Facility: HOSPITAL | Age: 63
End: 2023-12-05
Payer: COMMERCIAL

## 2023-12-05 DIAGNOSIS — K25.9 MULTIPLE GASTRIC ULCERS: Primary | ICD-10-CM

## 2023-12-05 RX ORDER — PANTOPRAZOLE SODIUM 40 MG/1
TABLET, DELAYED RELEASE ORAL
Qty: 30 TABLET | Refills: 0 | Status: SHIPPED | OUTPATIENT
Start: 2023-12-05 | End: 2024-01-04

## 2023-12-05 NOTE — TELEPHONE ENCOUNTER
"----- Message from Paula Salazar sent at 2023  3:14 PM CST -----  Regarding: FW: EGD    ----- Message -----  From: Pieter Davenport MD  Sent: 2023   2:09 PM CST  To: [facility]  Subject: EGD                                              Procedure: EGD    Diagnosis: Follow-up gastric ulcers    Procedure Timin-12 weeks from now    #If within 4 weeks selected, please roger as high priority#    #If greater than 12 weeks, please select "5-12 weeks" and delay sending until 3 months prior to requested date#     Provider: Myself    Location[facility]    Additional Scheduling Information: No scheduling concerns    Prep Specifications:N/A    Is the patient taking a GLP-1 Agonist:no    Have you attached a patient to this message: yes        "

## 2023-12-05 NOTE — TELEPHONE ENCOUNTER
Spoke to Val to schedule procedure(s) Upper Endoscopy (EGD)       Physician to perform procedure(s) Dr. NICK Davenport  Date of Procedure (s) 02/26/24  Arrival Time 9:15 AM  Time of Procedure(s) 10:15 AM   Location of Procedure(s) [address]  Type of Rx Prep sent to patient: Other  Instructions provided to patient via [facility]    Patient was informed on the following information and verbalized understanding. Screening questionnaire reviewed with patient and complete. If procedure requires anesthesia, a responsible adult needs to be present to accompany the patient home, patient cannot drive after receiving anesthesia. Appointment details are tentative, especially check-in time. Patient will receive a prep-op call 7 days prior to confirm check-in time for procedure. If applicable the patient should contact their pharmacy to verify Rx for procedure prep is ready for pick-up. Patient was advised to call the scheduling department at [phone] if pharmacy states no Rx is available. Patient was advised to call the endoscopy scheduling department if any questions or concerns arise.       Endoscopy Scheduling Department

## 2023-12-05 NOTE — TELEPHONE ENCOUNTER
----- Message from Vandana Rao sent at 12/5/2023  8:35 AM CST -----  Regarding: refill  Contact: [phone]  Pt requesting a refill   Medication name pantoprazole (PROTONIX) 40 MG tablet  Pharmacy   [id number] [facility] [address]

## 2023-12-08 LAB
FINAL PATHOLOGIC DIAGNOSIS: NORMAL
GROSS: NORMAL
Lab: NORMAL
MICROSCOPIC EXAM: NORMAL

## 2023-12-18 ENCOUNTER — TELEPHONE (OUTPATIENT)
Dept: INTERNAL MEDICINE | Facility: CLINIC | Age: 63
End: 2023-12-18
Payer: COMMERCIAL

## 2023-12-18 NOTE — TELEPHONE ENCOUNTER
----- Message from Reilly Gallo sent at 12/18/2023  8:40 AM CST -----  Contact: [phone]  [email or web address]     Patient is calling for Medical Advice regarding: itchy eyes and sore throat    How long has patient had these symptoms: 1 week    Pharmacy name and phone#:   [id number] - [address] AT [address] [address]  Phone: [phone] Fax: [phone]      Would like response via [facility]: call    Comments:  patient went to urgent care 2 weeks ago patient states Z-ana rosa and OTC is not helping

## 2023-12-18 NOTE — TELEPHONE ENCOUNTER
Still have a problem with her eye itching and watery .  Suggested that she try zyrtec since the Claritin is not work.  She would like to know what next  .

## 2023-12-29 ENCOUNTER — OFFICE VISIT (OUTPATIENT)
Dept: URGENT CARE | Facility: CLINIC | Age: 63
End: 2023-12-29
Payer: COMMERCIAL

## 2023-12-29 VITALS
WEIGHT: 143 LBS | TEMPERATURE: 98 F | RESPIRATION RATE: 18 BRPM | HEIGHT: 60 IN | SYSTOLIC BLOOD PRESSURE: 115 MMHG | OXYGEN SATURATION: 98 % | HEART RATE: 72 BPM | BODY MASS INDEX: 28.07 KG/M2 | DIASTOLIC BLOOD PRESSURE: 68 MMHG

## 2023-12-29 DIAGNOSIS — G89.29 CHRONIC HIP PAIN, LEFT: Primary | ICD-10-CM

## 2023-12-29 DIAGNOSIS — M70.62 TROCHANTERIC BURSITIS OF LEFT HIP: ICD-10-CM

## 2023-12-29 DIAGNOSIS — M25.552 CHRONIC HIP PAIN, LEFT: Primary | ICD-10-CM

## 2023-12-29 DIAGNOSIS — H01.004 BLEPHARITIS OF UPPER EYELIDS OF BOTH EYES, UNSPECIFIED TYPE: ICD-10-CM

## 2023-12-29 DIAGNOSIS — H01.001 BLEPHARITIS OF UPPER EYELIDS OF BOTH EYES, UNSPECIFIED TYPE: ICD-10-CM

## 2023-12-29 DIAGNOSIS — H57.9 ITCHY EYES: ICD-10-CM

## 2023-12-29 PROCEDURE — 99214 OFFICE O/P EST MOD 30 MIN: CPT | Mod: S$GLB,,, | Performed by: FAMILY MEDICINE

## 2023-12-29 PROCEDURE — 99214 PR OFFICE/OUTPT VISIT, EST, LEVL IV, 30-39 MIN: ICD-10-PCS | Mod: S$GLB,,, | Performed by: FAMILY MEDICINE

## 2023-12-29 PROCEDURE — 73502 X-RAY EXAM HIP UNI 2-3 VIEWS: CPT | Mod: LT,S$GLB,, | Performed by: RADIOLOGY

## 2023-12-29 PROCEDURE — 73502 XR HIP WITH PELVIS WHEN PERFORMED, 2 OR 3 VIEWS LEFT: ICD-10-PCS | Mod: LT,S$GLB,, | Performed by: RADIOLOGY

## 2023-12-29 RX ORDER — METHYLPREDNISOLONE 4 MG/1
TABLET ORAL
Qty: 21 EACH | Refills: 0 | Status: SHIPPED | OUTPATIENT
Start: 2023-12-29

## 2023-12-29 RX ORDER — ERYTHROMYCIN 5 MG/G
OINTMENT OPHTHALMIC
Qty: 3.5 G | Refills: 0 | Status: SHIPPED | OUTPATIENT
Start: 2023-12-29

## 2023-12-29 NOTE — PROGRESS NOTES
Subjective:      Patient ID: Val Horvath is a 63 y.o. female.    Vitals:  height is 5' (1.524 m) and weight is 64.9 kg (143 lb). Her oral temperature is 98 °F (36.7 °C). Her blood pressure is 115/68 and her pulse is 72. Her respiration is 18 and oxygen saturation is 98%.     Chief Complaint: Leg Pain     63-year-old female patient with history of chronic left hip pain which she has stated in  previous visit for same concern that she has hx of trochanteric bursitis due to being a  and having her gun strap around her hips constantly and standing for long time reports of left hip pain flare up for the last week.      Patient states  has been applying  ice therapy  No injury or trauma reported.  No fever or chills.  Does not endorse any decreased range of motion of the hip.  She has been given a Toradol injection in the past for acute on chronic hip pain as well as prescribed oral nonsteroidal anti-inflammatory/ibuprofen.    Pt also co   Bilateral eyelid irritation and discomfort since 11/2023 - pt states has happened before and was rx abx. She has been using Pataday daily without alleviation    Leg Pain   The incident occurred more than 1 week ago. The pain is present in the left hip and left thigh. The quality of the pain is described as shooting, stabbing, burning, cramping and aching. The pain is at a severity of 10/10. The pain is severe. The pain has been Worsening since onset. She reports no foreign bodies present. The symptoms are aggravated by weight bearing and movement. She has tried ice for the symptoms. The treatment provided no relief.     ROS   Objective:     Physical Exam  Constitutional: Pt oriented to person, place, and time.  Non-toxic appearance.   Patient does not appear ill. No distress. normal  HENT: No icterus or facial swelling appreciated  Head: Normocephalic and atraumatic.   Nose: No congestion.   Eyes:   Left eye: conjunctiva without erythema/ injection, no lid or lash  lesions though there is mild erythema at the lower > upper lid.  no ptosis, no periorbital swelling or erythema.   Right eye: conjunctiva without erythema/ injection, no lid or lash lesions though there is mild erythema at the lower > upper lid.  no ptosis, no periorbital swelling or erythe, no ptosis, no periorbital swelling or erythema.   Bilateral EOMI and PERRLA    Pulmonary/Chest: Effort normal. No stridor. No respiratory distress.   Abdominal: Normal appearance. Abdomen exhibits no distension.   Musculoskeletal:         Left hip: there is no gross swelling. At the hip. There is mild to moderate TTP at the lateral inferior hip area, overlying trochanter. FROM hip and able to weight bear, nml gait  Neurological: no focal deficit. Patient is alert and oriented to person, place, and time.   Skin: Skin is not diaphoretic and not pale. no jaundice  Psychiatric: Patients behavior is normal. Mood, judgment and thought content normal.     Assessment:     1. Chronic hip pain, left    2. Blepharitis of upper eyelids of both eyes, unspecified type    3. Itchy eyes    4. Trochanteric bursitis of left hip        Plan:       Chronic hip pain, left  -     X-Ray Hip 2 or 3 views Left (with Pelvis when performed); Future; Expected date: 12/29/2023-- mild DJD  -     Ambulatory referral/consult to Orthopedics    Blepharitis of upper eyelids of both eyes, unspecified type  -     erythromycin (ROMYCIN) ophthalmic ointment; Place into the left eye 5 (five) times daily.  Dispense: 3.5 g; Refill: 0  -     Ambulatory referral/consult to Ophthalmology    Itchy eyes  -recurring issue,        Ambulatory referral/consult to Ophthalmology    Possible Trochanteric bursitis of left hip  -     methylPREDNISolone (MEDROL DOSEPACK) 4 mg tablet; use as directed  Dispense: 21 each; Refill: 0

## 2024-01-04 RX ORDER — PANTOPRAZOLE SODIUM 40 MG/1
TABLET, DELAYED RELEASE ORAL
Qty: 30 TABLET | Refills: 0 | Status: SHIPPED | OUTPATIENT
Start: 2024-01-04 | End: 2024-01-10 | Stop reason: SDUPTHER

## 2024-01-08 ENCOUNTER — TELEPHONE (OUTPATIENT)
Dept: GASTROENTEROLOGY | Facility: CLINIC | Age: 64
End: 2024-01-08
Payer: COMMERCIAL

## 2024-01-08 NOTE — TELEPHONE ENCOUNTER
----- Message from Heena Landeros sent at 1/8/2024  8:20 AM CST -----  Regarding: Refill  Contact: Pt [phone]  Rx Refill/Request    Is this a Refill or New Rx:  refill  Prescription: pantoprazole (PROTONIX) 40 MG tablet 30 tablet    Preferred Pharmacy with phone number:  [facility] #[id number] [address] [address]  Phone: [phone] Fax: [phone]      Additional Information:Please call [phone]

## 2024-01-10 RX ORDER — PANTOPRAZOLE SODIUM 40 MG/1
40 TABLET, DELAYED RELEASE ORAL DAILY
Qty: 90 TABLET | Refills: 3 | Status: SHIPPED | OUTPATIENT
Start: 2024-01-10 | End: 2024-01-12

## 2024-01-11 ENCOUNTER — OFFICE VISIT (OUTPATIENT)
Dept: ORTHOPEDICS | Facility: CLINIC | Age: 64
End: 2024-01-11
Payer: COMMERCIAL

## 2024-01-11 VITALS — HEIGHT: 60 IN | BODY MASS INDEX: 28.07 KG/M2 | WEIGHT: 143 LBS

## 2024-01-11 DIAGNOSIS — M70.62 TROCHANTERIC BURSITIS OF LEFT HIP: Primary | ICD-10-CM

## 2024-01-11 PROCEDURE — 1159F MED LIST DOCD IN RCRD: CPT | Mod: CPTII,S$GLB,, | Performed by: PHYSICIAN ASSISTANT

## 2024-01-11 PROCEDURE — 20610 DRAIN/INJ JOINT/BURSA W/O US: CPT | Mod: LT,S$GLB,, | Performed by: PHYSICIAN ASSISTANT

## 2024-01-11 PROCEDURE — 99999 PR PBB SHADOW E&M-EST. PATIENT-LVL III: CPT | Mod: PBBFAC,,, | Performed by: PHYSICIAN ASSISTANT

## 2024-01-11 PROCEDURE — 99203 OFFICE O/P NEW LOW 30 MIN: CPT | Mod: 25,S$GLB,, | Performed by: PHYSICIAN ASSISTANT

## 2024-01-11 PROCEDURE — 3008F BODY MASS INDEX DOCD: CPT | Mod: CPTII,S$GLB,, | Performed by: PHYSICIAN ASSISTANT

## 2024-01-11 PROCEDURE — 1160F RVW MEDS BY RX/DR IN RCRD: CPT | Mod: CPTII,S$GLB,, | Performed by: PHYSICIAN ASSISTANT

## 2024-01-11 RX ORDER — TRIAMCINOLONE ACETONIDE 40 MG/ML
40 INJECTION, SUSPENSION INTRA-ARTICULAR; INTRAMUSCULAR
Status: DISCONTINUED | OUTPATIENT
Start: 2024-01-11 | End: 2024-01-11 | Stop reason: HOSPADM

## 2024-01-11 RX ORDER — LIDOCAINE HYDROCHLORIDE 10 MG/ML
5 INJECTION INFILTRATION; PERINEURAL
Status: DISCONTINUED | OUTPATIENT
Start: 2024-01-11 | End: 2024-01-11 | Stop reason: HOSPADM

## 2024-01-11 RX ADMIN — LIDOCAINE HYDROCHLORIDE 5 ML: 10 INJECTION INFILTRATION; PERINEURAL at 06:01

## 2024-01-11 RX ADMIN — TRIAMCINOLONE ACETONIDE 40 MG: 40 INJECTION, SUSPENSION INTRA-ARTICULAR; INTRAMUSCULAR at 06:01

## 2024-01-11 NOTE — PROGRESS NOTES
SUBJECTIVE:     Chief Complaint   Patient presents with    Left Hip - Pain       History of Present Illness:  Val Horvath is a 63 y.o. year old female presents today for constant left hip pain which started a few months ago.  There is not a history of trauma.  The pain is located in the lateral aspect of the hip.  The pain is described as achy.  It is is worse with standing, walking sitting .  Previous treatments include acetaminophen and rest which have provided minimal relief.  There is not a history of previous injury or surgery to the hip.  The patient does not use an assistive device.      Past Medical History:   Diagnosis Date    Anxiety     AR (allergic rhinitis) 3/10/2014    Diverticulosis 2010    Family history of breast cancer in sister     Genital herpes     Hiatal hernia 2010    Irritable bowel syndrome     Meningioma 3/10/2014    MRI 2/14    Post herpetic neuralgia 1/21/2014 1/14        Past Surgical History:   Procedure Laterality Date    CARPAL TUNNEL RELEASE Right 1990s    COLONOSCOPY N/A 12/2/2020    Procedure: COLONOSCOPY;  Surgeon: Pieter Davenport MD;  [facility]  Service: Endoscopy;  Laterality: N/A;  patient due for colonoscopy in November 2020  prep ins. emailed  and mailed - COVID screening on 11/29/20 [facility]  patient confirmed new arrival time of 0900-BB    ESOPHAGOGASTRODUODENOSCOPY N/A 12/1/2023    Procedure: EGD (ESOPHAGOGASTRODUODENOSCOPY);  Surgeon: Pieter Davenport MD;  Location: [facility] (4TH FLR);  Service: Endoscopy;  Laterality: N/A;  time frame 5-12 weeks  dr. davenport patient  prep instructions given to pt in clininc and via portal -  pt is hypoglycemic  11/24 precall complete-ml    OOPHORECTOMY Right 1970s       Family History   Problem Relation Age of Onset    Heart disease Mother 65        MI    Hypertension Mother     Kidney disease Mother     Bladder Cancer Father         bladder    Breast cancer Sister 45    No Known Problems  Brother     Cancer Paternal Uncle         colon cancer        Review of patient's allergies indicates:   Allergen Reactions    Codeine Itching    Sulfa (sulfonamide antibiotics) Itching         Current Outpatient Medications:     acyclovir (ZOVIRAX) 400 MG tablet, Take 1 tablet (400 mg total) by mouth 2 (two) times daily., Disp: 180 tablet, Rfl: 3    ALPRAZolam (XANAX) 0.25 MG tablet, Take 1 tablet (0.25 mg total) by mouth 2 (two) times daily as needed for Anxiety., Disp: 60 tablet, Rfl: 3    cetirizine (ZYRTEC) 10 MG tablet, Take 1 tablet (10 mg total) by mouth once daily., Disp: 90 tablet, Rfl: 3    cyclobenzaprine (FLEXERIL) 5 MG tablet, Take 1 tablet (5 mg total) by mouth nightly as needed for Muscle spasms., Disp: 30 tablet, Rfl: 5    erythromycin (ROMYCIN) ophthalmic ointment, Place into the left eye 5 (five) times daily., Disp: 3.5 g, Rfl: 0    fluticasone propionate (FLONASE) 50 mcg/actuation nasal spray, SMARTSI Spray(s) Both Nares Daily PRN, Disp: , Rfl:     gabapentin (NEURONTIN) 300 MG capsule, TAKE UP TO 6 CAPSULES BY MOUTH EVERY DAY, Disp: 180 capsule, Rfl: 11    guaiFENesin (MUCINEX) 600 mg 12 hr tablet, Take 2 tablets (1,200 mg total) by mouth 2 (two) times daily as needed (cough)., Disp: 40 tablet, Rfl: 0    ibuprofen (ADVIL,MOTRIN) 600 MG tablet, Take 1 tablet (600 mg total) by mouth every 8 (eight) hours as needed for Pain., Disp: 20 tablet, Rfl: 0    methylPREDNISolone (MEDROL DOSEPACK) 4 mg tablet, use as directed, Disp: 21 each, Rfl: 0    mometasone (NASONEX) 50 mcg/actuation nasal spray, 1-2 sprays by Nasal route once daily., Disp: 17 g, Rfl: 11    pantoprazole (PROTONIX) 40 MG tablet, Take 1 tablet (40 mg total) by mouth once daily., Disp: 90 tablet, Rfl: 3    Review of Systems:  ROS:  Constitutional: no fever or chills  Eyes: no visual changes  Respiratory: no cough or shortness of breath  Musculoskeletal: no arthralgias or myalgias      PE:  Ht 5' (1.524 m)   Wt 64.9 kg (143 lb)   LMP  01/01/2000 (Within Months)   BMI 27.93 kg/m²   General: Pleasant, cooperative, NAD   HEENT: NCAT, sclera nonicteric   Lungs: Respirations are equal and unlabored.   CV: 2+ bilateral upper and lower extremity pulses.   Skin: Intact throughout LE with no rashes, erythema, or lesions  Extremities: No LE edema, NVI lower extremities    Left     Hip Exam:  Skin intact  TTP along GTB  Pain localizes to lateral hip with PROM  5/5 quadriceps, 5/5 hamstring  Negative SLR  Negative stinchfield    IMAGING:  X-rays of the left hip, personally reviewed by me, demonstrate mild degenerative changes.  No fracture or dislocation.   ASSESSMENT     1. Trochanteric bursitis of left hip        PLAN:   We discussed with the patient at length all the different treatment options available for the hip including anti-inflammatories, acetaminophen, rest, ice, lower extremity strengthening exercise, occasional steroid injections for temporary relief, and finally total hip arthroplasty.     - Left hip GTB CSI today  - Recommend rest, ice stretching  - HEP given  - Follow up if symptoms worsen or fail to improve

## 2024-01-12 RX ORDER — PANTOPRAZOLE SODIUM 40 MG/1
40 TABLET, DELAYED RELEASE ORAL DAILY
Qty: 90 TABLET | Refills: 3 | Status: ON HOLD | OUTPATIENT
Start: 2024-01-12 | End: 2024-02-26

## 2024-01-12 NOTE — PROCEDURES
Large Joint Aspiration/Injection: L greater trochanteric bursa    Date/Time: 1/11/2024 6:45 PM    Performed by: Brittany Merlos PA-C  Authorized by: Brittany Merlos PA-C    Consent Done?:  Yes (Verbal)  Indications:  Pain  Prep: patient was prepped and draped in usual sterile fashion    Local anesthetic:  Topical anesthetic    Details:  Needle Size:  22 G  Approach:  Lateral  Location:  Hip  Site:  L greater trochanteric bursa  Medications:  40 mg triamcinolone acetonide 40 mg/mL; 5 mL LIDOcaine HCL 10 mg/ml (1%) 10 mg/mL (1 %)  Patient tolerance:  Patient tolerated the procedure well with no immediate complications

## 2024-02-07 NOTE — TELEPHONE ENCOUNTER
No care due was identified.  [facility] Embedded Care Due Messages. Reference number: [id number].   2/07/2024 1:48:23 PM CST

## 2024-02-07 NOTE — TELEPHONE ENCOUNTER
----- Message from Griselda Raygoza sent at 2/7/2024  1:29 PM CST -----  Contact: Val   Requesting an RX refill or new RX.  Is this a refill or new RX: refill   RX name and strength (copy/paste from chart): Alprazolam    Is this a 30 day or 90 day RX:   Pharmacy name and phone # (copy/paste from chart):  [facility]   The doctors have asked that we provide their patients with the following 2 reminders -- prescription refills can take up to 72 hours, and a friendly reminder that in the future you can use your [facility] account to request refills:

## 2024-02-09 RX ORDER — FLUTICASONE PROPIONATE 50 MCG
SPRAY, SUSPENSION (ML) NASAL
Qty: 18.2 ML | Refills: 11 | Status: SHIPPED | OUTPATIENT
Start: 2024-02-09

## 2024-02-09 RX ORDER — ALPRAZOLAM 0.25 MG/1
0.25 TABLET ORAL 2 TIMES DAILY PRN
Qty: 60 TABLET | Refills: 0 | Status: SHIPPED | OUTPATIENT
Start: 2024-02-09 | End: 2024-03-28 | Stop reason: SDUPTHER

## 2024-02-09 NOTE — TELEPHONE ENCOUNTER
Called pt in regards to 6 months follow up controlled substance. Pt schedule for Annual Exam/6 mos follow up on 7/8/24 at 3:30 pm . Pt Vu that she cannot get prescription filled if she is not evaluated in 6 mos.

## 2024-02-12 ENCOUNTER — TELEPHONE (OUTPATIENT)
Dept: INTERNAL MEDICINE | Facility: CLINIC | Age: 64
End: 2024-02-12
Payer: COMMERCIAL

## 2024-02-12 NOTE — TELEPHONE ENCOUNTER
----- Message from Krystal Rizzo sent at 2/12/2024 10:06 AM CST -----  Contact: [phone]  [email or web address]     Patient is calling for Medical Advice regarding: pt says she has a chest cold and would like a rx called in  and also says she has a bad headache      How long has patient had these symptoms:    Pharmacy name and phone#:    Would like response via [facility]: call back     Comments:       Likely acute inflammatory marker

## 2024-02-12 NOTE — TELEPHONE ENCOUNTER
Spoke to patient and she stated that over the counter med are not working .  Explain that we will closed on tomorrow , suggested that she go to urgent care or emergency room to be evaluated .  Patient was upset and said that every time she call it something and she want to speak to the doctor .  I told her that she was not in and she requested that we send her a message  for her to calll her

## 2024-02-14 ENCOUNTER — TELEPHONE (OUTPATIENT)
Dept: INTERNAL MEDICINE | Facility: CLINIC | Age: 64
End: 2024-02-14
Payer: COMMERCIAL

## 2024-02-16 ENCOUNTER — OFFICE VISIT (OUTPATIENT)
Dept: URGENT CARE | Facility: CLINIC | Age: 64
End: 2024-02-16
Payer: COMMERCIAL

## 2024-02-16 VITALS
HEIGHT: 60 IN | OXYGEN SATURATION: 98 % | RESPIRATION RATE: 16 BRPM | HEART RATE: 105 BPM | SYSTOLIC BLOOD PRESSURE: 128 MMHG | WEIGHT: 143.06 LBS | DIASTOLIC BLOOD PRESSURE: 70 MMHG | TEMPERATURE: 100 F | BODY MASS INDEX: 28.09 KG/M2

## 2024-02-16 DIAGNOSIS — J01.00 ACUTE MAXILLARY SINUSITIS, RECURRENCE NOT SPECIFIED: ICD-10-CM

## 2024-02-16 DIAGNOSIS — R05.9 COUGH, UNSPECIFIED TYPE: ICD-10-CM

## 2024-02-16 DIAGNOSIS — J22 LOWER RESPIRATORY INFECTION: Primary | ICD-10-CM

## 2024-02-16 LAB
CTP QC/QA: YES
SARS-COV-2 AG RESP QL IA.RAPID: NEGATIVE

## 2024-02-16 PROCEDURE — 87811 SARS-COV-2 COVID19 W/OPTIC: CPT | Mod: QW,S$GLB,, | Performed by: FAMILY MEDICINE

## 2024-02-16 PROCEDURE — 99214 OFFICE O/P EST MOD 30 MIN: CPT | Mod: S$GLB,,, | Performed by: FAMILY MEDICINE

## 2024-02-16 RX ORDER — PROMETHAZINE HYDROCHLORIDE AND DEXTROMETHORPHAN HYDROBROMIDE 6.25; 15 MG/5ML; MG/5ML
5 SYRUP ORAL EVERY 4 HOURS PRN
Qty: 118 ML | Refills: 0 | Status: SHIPPED | OUTPATIENT
Start: 2024-02-16 | End: 2024-02-26

## 2024-02-16 RX ORDER — BENZONATATE 200 MG/1
200 CAPSULE ORAL 3 TIMES DAILY PRN
Qty: 30 CAPSULE | Refills: 0 | Status: SHIPPED | OUTPATIENT
Start: 2024-02-16 | End: 2024-02-26

## 2024-02-16 RX ORDER — AMOXICILLIN AND CLAVULANATE POTASSIUM 875; 125 MG/1; MG/1
1 TABLET, FILM COATED ORAL EVERY 12 HOURS
Qty: 14 TABLET | Refills: 0 | Status: SHIPPED | OUTPATIENT
Start: 2024-02-16 | End: 2024-02-23

## 2024-02-16 NOTE — LETTER
February 16, 2024      Urgent [facility] [address] [address]  Phone: [phone]  Fax: [phone]       Patient: Val Horvath   YOB: 1960  Date of Visit: 02/16/2024    To Whom It May Concern:    Haydee Horvath  was at [facility] on 02/16/2024. The patient may return to work/school on 2/20/24 as long as fever free and symptoms have improved. If you have any questions or concerns, or if I can be of further assistance, please do not hesitate to contact me.    Sincerely,    Rachana Regalado, DO

## 2024-02-17 NOTE — PROGRESS NOTES
Subjective:      Patient ID: Val Horvath is a 63 y.o. female.    Vitals:  height is 5' (1.524 m) and weight is 64.9 kg (143 lb 1.3 oz). Her oral temperature is 99.8 °F (37.7 °C). Her blood pressure is 128/70 and her pulse is 105. Her respiration is 16 and oxygen saturation is 98%.     Chief Complaint: No chief complaint on file.    Pt has been having a sore throat, cough, and headaches x 3 days. Also with blood tinged sputum and nasal secretions that are thick yellow/green.  Pt reports that she has been taking theraflu, robitussin dm, and zyrtec without alleviation.  Denies any chest pain or shortness a breath.Pt advised to seek medical attention in nearest Emergency Department if experiencing any worsening or worrisome symptoms      Sinus Problem  This is a new problem. The problem is unchanged. There has been no fever. Her pain is at a severity of 10/10. The pain is severe. Associated symptoms include coughing, headaches and a sore throat. Pertinent negatives include no chills, congestion, diaphoresis, ear pain, hoarse voice, neck pain, shortness of breath, sinus pressure, sneezing or swollen glands. Past treatments include oral decongestants (theraflu, robitussin dm, and zyrtec.). The treatment provided mild relief.       Constitution: Negative for chills and sweating.   HENT:  Positive for sore throat. Negative for ear pain, congestion and sinus pressure.    Neck: Negative for neck pain.   Respiratory:  Positive for cough. Negative for shortness of breath.    Allergic/Immunologic: Negative for sneezing.   Neurological:  Positive for headaches.      Objective:     Physical Exam  Constitutional: Pt oriented to person, place, and time.  Non-toxic appearance.   Patient does not appear ill. No distress. normal  HENT: No icterus or facial swelling appreciated  Head: Normocephalic and atraumatic.   Nose: + congestion.   Oropharynx: pharynx/tonsils without erythema or exudates. No uvular shift or soft palate  swelling. No stridor    Pulmonary/Chest: Effort normal. No stridor. No respiratory distress. + productive cough during exam with the yellow-green sputum blood-tinged   S1/s2 RRR  Abdominal: Normal appearance. Abdomen exhibits no distension.   Musculoskeletal:         General: No swelling.   Neurological: no focal deficit. Patient is alert and oriented to person, place, and time.   Skin: Skin is not diaphoretic and not pale. no jaundice  Psychiatric: Patients behavior is normal. Mood, judgment and thought content normal.     Assessment:     1. Lower respiratory infection    2. Cough, unspecified type    3. Acute maxillary sinusitis, recurrence not specified        Plan:       Lower respiratory infection  -     amoxicillin-clavulanate 875-125mg (AUGMENTIN) 875-125 mg per tablet; Take 1 tablet by mouth every 12 (twelve) hours. for 7 days  Dispense: 14 tablet; Refill: 0    Cough, unspecified type  -     SARS Coronavirus 2 Antigen, POCT Manual Read  -     promethazine-dextromethorphan (PROMETHAZINE-DM) 6.25-15 mg/5 mL Syrp; Take 5 mLs by mouth every 4 (four) hours as needed.  Dispense: 118 mL; Refill: 0  -     benzonatate (TESSALON) 200 MG capsule; Take 1 capsule (200 mg total) by mouth 3 (three) times daily as needed.  Dispense: 30 capsule; Refill: 0    Acute maxillary sinusitis, recurrence not specified  -     amoxicillin-clavulanate 875-125mg (AUGMENTIN) 875-125 mg per tablet; Take 1 tablet by mouth every 12 (twelve) hours. for 7 days  Dispense: 14 tablet; Refill: 0      Pt advised to seek medical attention in nearest Emergency Department if experiencing any worsening or worrisome symptoms

## 2024-02-19 ENCOUNTER — PATIENT MESSAGE (OUTPATIENT)
Dept: ENDOSCOPY | Facility: HOSPITAL | Age: 64
End: 2024-02-19
Payer: COMMERCIAL

## 2024-02-26 ENCOUNTER — ANESTHESIA EVENT (OUTPATIENT)
Dept: ENDOSCOPY | Facility: HOSPITAL | Age: 64
End: 2024-02-26
Payer: COMMERCIAL

## 2024-02-26 ENCOUNTER — TELEPHONE (OUTPATIENT)
Dept: URGENT CARE | Facility: CLINIC | Age: 64
End: 2024-02-26
Payer: COMMERCIAL

## 2024-02-26 ENCOUNTER — HOSPITAL ENCOUNTER (OUTPATIENT)
Facility: HOSPITAL | Age: 64
Discharge: HOME | End: 2024-02-26
Attending: INTERNAL MEDICINE | Admitting: INTERNAL MEDICINE
Payer: COMMERCIAL

## 2024-02-26 ENCOUNTER — ANESTHESIA (OUTPATIENT)
Dept: ENDOSCOPY | Facility: HOSPITAL | Age: 64
End: 2024-02-26
Payer: COMMERCIAL

## 2024-02-26 VITALS
HEIGHT: 60 IN | OXYGEN SATURATION: 97 % | HEART RATE: 80 BPM | DIASTOLIC BLOOD PRESSURE: 73 MMHG | WEIGHT: 143 LBS | RESPIRATION RATE: 18 BRPM | SYSTOLIC BLOOD PRESSURE: 107 MMHG | TEMPERATURE: 98 F | BODY MASS INDEX: 28.07 KG/M2

## 2024-02-26 DIAGNOSIS — K25.3 ACUTE GASTRIC ULCER: ICD-10-CM

## 2024-02-26 DIAGNOSIS — K25.3 ACUTE GASTRIC ULCER WITHOUT HEMORRHAGE OR PERFORATION: Primary | ICD-10-CM

## 2024-02-26 PROCEDURE — 25000003 PHARM REV CODE 250: Performed by: NURSE ANESTHETIST, CERTIFIED REGISTERED

## 2024-02-26 PROCEDURE — D9220A PRA ANESTHESIA: Mod: CRNA,,, | Performed by: NURSE ANESTHETIST, CERTIFIED REGISTERED

## 2024-02-26 PROCEDURE — 43235 EGD DIAGNOSTIC BRUSH WASH: CPT | Performed by: INTERNAL MEDICINE

## 2024-02-26 PROCEDURE — 37000009 HC ANESTHESIA EA ADD 15 MINS: Performed by: INTERNAL MEDICINE

## 2024-02-26 PROCEDURE — 25000003 PHARM REV CODE 250: Performed by: INTERNAL MEDICINE

## 2024-02-26 PROCEDURE — 63600175 PHARM REV CODE 636 W HCPCS: Performed by: NURSE ANESTHETIST, CERTIFIED REGISTERED

## 2024-02-26 PROCEDURE — 43235 EGD DIAGNOSTIC BRUSH WASH: CPT | Mod: ,,, | Performed by: INTERNAL MEDICINE

## 2024-02-26 PROCEDURE — 37000008 HC ANESTHESIA 1ST 15 MINUTES: Performed by: INTERNAL MEDICINE

## 2024-02-26 PROCEDURE — D9220A PRA ANESTHESIA: Mod: ANES,,, | Performed by: ANESTHESIOLOGY

## 2024-02-26 RX ORDER — SODIUM CHLORIDE 9 MG/ML
INJECTION, SOLUTION INTRAVENOUS CONTINUOUS
Status: DISCONTINUED | OUTPATIENT
Start: 2024-02-26 | End: 2024-02-26 | Stop reason: HOSPADM

## 2024-02-26 RX ORDER — SODIUM CHLORIDE 0.9 % (FLUSH) 0.9 %
10 SYRINGE (ML) INJECTION
Status: DISCONTINUED | OUTPATIENT
Start: 2024-02-26 | End: 2024-02-26 | Stop reason: HOSPADM

## 2024-02-26 RX ORDER — ONDANSETRON HYDROCHLORIDE 2 MG/ML
INJECTION, SOLUTION INTRAVENOUS
Status: DISCONTINUED | OUTPATIENT
Start: 2024-02-26 | End: 2024-02-26

## 2024-02-26 RX ORDER — PROPOFOL 10 MG/ML
VIAL (ML) INTRAVENOUS
Status: DISCONTINUED | OUTPATIENT
Start: 2024-02-26 | End: 2024-02-26

## 2024-02-26 RX ORDER — PANTOPRAZOLE SODIUM 40 MG/1
40 TABLET, DELAYED RELEASE ORAL DAILY
Qty: 90 TABLET | Refills: 3 | Status: SHIPPED | OUTPATIENT
Start: 2024-02-26 | End: 2025-02-25

## 2024-02-26 RX ORDER — FENTANYL CITRATE 50 UG/ML
INJECTION, SOLUTION INTRAMUSCULAR; INTRAVENOUS
Status: DISCONTINUED | OUTPATIENT
Start: 2024-02-26 | End: 2024-02-26

## 2024-02-26 RX ORDER — LIDOCAINE HYDROCHLORIDE 20 MG/ML
INJECTION, SOLUTION EPIDURAL; INFILTRATION; INTRACAUDAL; PERINEURAL
Status: DISCONTINUED | OUTPATIENT
Start: 2024-02-26 | End: 2024-02-26

## 2024-02-26 RX ADMIN — PROPOFOL 30 MG: 10 INJECTION, EMULSION INTRAVENOUS at 10:02

## 2024-02-26 RX ADMIN — LIDOCAINE HYDROCHLORIDE 80 MG: 20 INJECTION, SOLUTION EPIDURAL; INFILTRATION; INTRACAUDAL at 10:02

## 2024-02-26 RX ADMIN — PROPOFOL 50 MG: 10 INJECTION, EMULSION INTRAVENOUS at 10:02

## 2024-02-26 RX ADMIN — SODIUM CHLORIDE: 9 INJECTION, SOLUTION INTRAVENOUS at 09:02

## 2024-02-26 RX ADMIN — FENTANYL CITRATE 25 MCG: 50 INJECTION INTRAMUSCULAR; INTRAVENOUS at 10:02

## 2024-02-26 RX ADMIN — ONDANSETRON 4 MG: 2 INJECTION INTRAMUSCULAR; INTRAVENOUS at 10:02

## 2024-02-26 RX ADMIN — GLYCOPYRROLATE 0.2 MG: 0.2 INJECTION INTRAMUSCULAR; INTRAVENOUS at 10:02

## 2024-02-26 NOTE — PROVATION PATIENT INSTRUCTIONS
Discharge Summary/Instructions after an Endoscopic Procedure  Patient Name: Val Horvath  Patient MRN: [id number]  Patient YOB: 1960 Monday, February 26, 2024  Pieter Davenport MD  Dear patient,  As a result of recent federal legislation (The Federal Cures Act), you may   receive lab or pathology results from your procedure in your [facility]   account before your physician is able to contact you. Your physician or   their representative will relay the results to you with their   recommendations at their soonest availability.  Thank you,  RESTRICTIONS:  During your procedure today, you received medications for sedation.  These   medications may affect your judgment, balance and coordination.  Therefore,   for 24 hours, you have the following restrictions:   - DO NOT drive a car, operate machinery, make legal/financial decisions,   sign important papers or drink alcohol.    ACTIVITY:  Today: no heavy lifting, straining or running due to procedural   sedation/anesthesia.  The following day: return to full activity including work.  DIET:  Eat and drink normally unless instructed otherwise.     TREATMENT FOR COMMON SIDE EFFECTS:  - Mild abdominal pain, nausea, belching, bloating or excessive gas:  rest,   eat lightly and use a heating pad.  - Sore Throat: treat with throat lozenges and/or gargle with warm salt   water.  - Because air was used during the procedure, expelling large amounts of air   from your rectum or belching is normal.  - If a bowel prep was taken, you may not have a bowel movement for 1-3 days.    This is normal.  SYMPTOMS TO WATCH FOR AND REPORT TO YOUR PHYSICIAN:  1. Abdominal pain or bloating, other than gas cramps.  2. Chest pain.  3. Back pain.  4. Signs of infection such as: chills or fever occurring within 24 hours   after the procedure.  5. Rectal bleeding, which would show as bright red, maroon, or black stools.   (A tablespoon of blood from the rectum is not serious, especially  if   hemorrhoids are present.)  6. Vomiting.  7. Weakness or dizziness.  GO DIRECTLY TO THE NEAREST EMERGENCY ROOM IF YOU HAVE ANY OF THE FOLLOWING:      Difficulty breathing              Chills and/or fever over 101 F   Persistent vomiting and/or vomiting blood   Severe abdominal pain   Severe chest pain   Black, tarry stools   Bleeding- more than one tablespoon   Any other symptom or condition that you feel may need urgent attention  Your doctor recommends these additional instructions:  If any biopsies were taken, your doctors clinic will contact you in 1 to 2   weeks with any results.  - Discharge patient to home.   - Patient has a contact number available for emergencies.  The signs and   symptoms of potential delayed complications were discussed with the   patient.  Return to normal activities tomorrow.  Written discharge   instructions were provided to the patient.   - Resume previous diet.   - Continue present medications.  For questions, problems or results please call your physician - Pieter Davenport MD at Work:  [phone].  [facility], EMERGENCY ROOM PHONE NUMBER: [phone]  IF A COMPLICATION OR EMERGENCY SITUATION ARISES AND YOU ARE UNABLE TO REACH   YOUR PHYSICIAN - GO DIRECTLY TO THE EMERGENCY ROOM.  Pieter Davenport MD  2/26/2024 11:05:02 AM  This report has been verified and signed electronically.  Dear patient,  As a result of recent federal legislation (The Federal Cures Act), you may   receive lab or pathology results from your procedure in your [facility]   account before your physician is able to contact you. Your physician or   their representative will relay the results to you with their   recommendations at their soonest availability.  Thank you,  PROVATION

## 2024-02-26 NOTE — H&P
Short Stay Endoscopy History and Physical    PCP - India Camacho MD    Procedure - EGD  ASA - per anesthesia  Mallampati - per anesthesia  History of Anesthesia problems - no  Family history Anesthesia problems -  no   Plan of anesthesia - MAC    HPI:  This is a 63 y.o. female here for evaluation of acute gastric ulcers.         ROS:  Constitutional: No fevers, chills  CV: No chest pain  Pulm: No cough, No shortness of breath  Ophtho: No vision changes  GI: see HPI    Medical History:  has a past medical history of Anxiety, AR (allergic rhinitis) (3/10/2014), Diverticulosis (2010), Family history of breast cancer in sister, Genital herpes, Hiatal hernia (2010), Irritable bowel syndrome, Meningioma (3/10/2014), and Post herpetic neuralgia (1/21/2014).    Surgical History:  has a past surgical history that includes Oophorectomy (Right, 1970s); Carpal tunnel release (Right, 1990s); Colonoscopy (N/A, 12/2/2020); and Esophagogastroduodenoscopy (N/A, 12/1/2023).    Family History: family history includes Bladder Cancer in her father; Breast cancer (age of onset: 45) in her sister; Cancer in her paternal uncle; Heart disease (age of onset: 65) in her mother; Hypertension in her mother; Kidney disease in her mother; No Known Problems in her brother.. Otherwise no colon cancer, inflammatory bowel disease, or GI malignancies.    Social History:  reports that she has never smoked. She has never been exposed to tobacco smoke. She has never used smokeless tobacco. She reports current alcohol use. She reports that she does not use drugs.    Review of patient's allergies indicates:   Allergen Reactions    Codeine Itching    Sulfa (sulfonamide antibiotics) Itching       Medications:   Medications Prior to Admission   Medication Sig Dispense Refill Last Dose    ALPRAZolam (XANAX) 0.25 MG tablet Take 1 tablet (0.25 mg total) by mouth 2 (two) times daily as needed for Anxiety. 60 tablet 0 2/25/2024    gabapentin (NEURONTIN)  300 MG capsule TAKE UP TO 6 CAPSULES BY MOUTH EVERY  capsule 11 2/26/2024    pantoprazole (PROTONIX) 40 MG tablet Take 1 tablet (40 mg total) by mouth once daily. 90 tablet 3 2/26/2024    acyclovir (ZOVIRAX) 400 MG tablet Take 1 tablet (400 mg total) by mouth 2 (two) times daily. 180 tablet 3     benzonatate (TESSALON) 200 MG capsule Take 1 capsule (200 mg total) by mouth 3 (three) times daily as needed. 30 capsule 0     cetirizine (ZYRTEC) 10 MG tablet Take 1 tablet (10 mg total) by mouth once daily. 90 tablet 3     cyclobenzaprine (FLEXERIL) 5 MG tablet Take 1 tablet (5 mg total) by mouth nightly as needed for Muscle spasms. 30 tablet 5     erythromycin (ROMYCIN) ophthalmic ointment Place into the left eye 5 (five) times daily. 3.5 g 0     fluticasone propionate (FLONASE) 50 mcg/actuation nasal spray 1 Spray(s) Both Nares Daily PRN 18.2 mL 11     guaiFENesin (MUCINEX) 600 mg 12 hr tablet Take 2 tablets (1,200 mg total) by mouth 2 (two) times daily as needed (cough). 40 tablet 0     ibuprofen (ADVIL,MOTRIN) 600 MG tablet Take 1 tablet (600 mg total) by mouth every 8 (eight) hours as needed for Pain. 20 tablet 0     methylPREDNISolone (MEDROL DOSEPACK) 4 mg tablet use as directed 21 each 0     mometasone (NASONEX) 50 mcg/actuation nasal spray 1-2 sprays by Nasal route once daily. 17 g 11     promethazine-dextromethorphan (PROMETHAZINE-DM) 6.25-15 mg/5 mL Syrp Take 5 mLs by mouth every 4 (four) hours as needed. 118 mL 0        Physical Exam:    Vital Signs:   Vitals:    02/26/24 0915   BP: 107/64   Pulse: 77   Resp: 16   Temp: 97.7 °F (36.5 °C)       General Appearance: Well appearing in no acute distress  Eyes:    No scleral icterus  Lungs: CTA anteriorly  Heart:  Regular rate and rhythm   Abdomen: Soft, non tender, non distended with normal bowel sounds.      Labs:  Lab Results   Component Value Date    WBC 6.5 07/24/2023    HGB 12.4 07/24/2023    HCT 37.2 07/24/2023    MCV 89.2 07/24/2023      "07/24/2023        BMP  Lab Results   Component Value Date     07/24/2023    K 4.5 07/24/2023     07/24/2023    CO2 28 07/24/2023    BUN 14 07/24/2023    CREATININE 0.63 07/24/2023    CALCIUM 9.3 07/24/2023    ESTGFRAFRICA 113 06/11/2022    EGFRNONAA 98 06/11/2022     No results found for: "INR", "PROTIME"       Assessment:  63 y.o. female with acute gastric ulcers, here for follow-up.     Plan:  Proceed with EGD today.  I have explained the risks and benefits of endoscopy procedures to the patient including but not limited to bleeding, perforation, infection, and death.  All questions answered.      Pieter Davenport MD    "

## 2024-02-26 NOTE — TELEPHONE ENCOUNTER
I returned patient's call. She was instructed by Dr. Regalado to call if symptoms don't get better. She was seen on 2/16. Given augmentin for lower respiratory infection and cough suppressants. She reports ongoing sinus congestion and cough. She currently has no fevers, chills, body aches, fatigue, wheezing, chest pain, shortness of breath. I advised to continue flonase, zyrtec, saline nasal spray, cough suppressants, increase fluids, home remedies. F/u with PCP for ongoing symptoms. Go to the ER if she hears wheezing with SOB, chest pain/congestion, fatigue, high fevers, confusion.

## 2024-02-26 NOTE — ANESTHESIA POSTPROCEDURE EVALUATION
Anesthesia Post Evaluation    Patient: Val Horvath    Procedure(s) Performed: Procedure(s) (LRB):  EGD (ESOPHAGOGASTRODUODENOSCOPY) (N/A)    Final Anesthesia Type: general      Patient location during evaluation: PACU  Patient participation: Yes- Able to Participate  Level of consciousness: awake and alert and oriented  Post-procedure vital signs: reviewed and stable  Pain management: adequate  Airway patency: patent    PONV status at discharge: No PONV  Anesthetic complications: no      Cardiovascular status: blood pressure returned to baseline, hemodynamically stable and stable  Respiratory status: unassisted, room air and spontaneous ventilation  Hydration status: euvolemic  Follow-up not needed.              Vitals Value Taken Time   /73 02/26/24 1131   Temp 36.5 °C (97.7 °F) 02/26/24 1130   Pulse 75 02/26/24 1134   Resp 18 02/26/24 1130   SpO2 92 % 02/26/24 1134   Vitals shown include unvalidated device data.      No case tracking events are documented in the log.      Pain/Kenneth Score: Kenneth Score: 9 (2/26/2024 11:30 AM)

## 2024-02-26 NOTE — ANESTHESIA PREPROCEDURE EVALUATION
02/26/2024  Pre-operative evaluation for Procedure(s) (LRB):  EGD (ESOPHAGOGASTRODUODENOSCOPY) (N/A)    Val Horvath is a 63 y.o. female     Patient Active Problem List   Diagnosis    Generalized headaches    Post herpetic neuralgia    Meningioma    Chronic seasonal allergic rhinitis    GERD (gastroesophageal reflux disease)    IBS (irritable bowel syndrome)    Generalized anxiety disorder with panic attacks    Herpes simplex vulvovaginitis    Family history of breast cancer in sister    Diverticulosis    Hiatal hernia    Neuralgia    BMI 30.0-30.9,adult    Hypoglycemic symptoms    Chronic low back pain without sciatica       Review of patient's allergies indicates:   Allergen Reactions    Codeine Itching    Sulfa (sulfonamide antibiotics) Itching       No current facility-administered medications on file prior to encounter.     Current Outpatient Medications on File Prior to Encounter   Medication Sig Dispense Refill    gabapentin (NEURONTIN) 300 MG capsule TAKE UP TO 6 CAPSULES BY MOUTH EVERY  capsule 11    acyclovir (ZOVIRAX) 400 MG tablet Take 1 tablet (400 mg total) by mouth 2 (two) times daily. 180 tablet 3    cetirizine (ZYRTEC) 10 MG tablet Take 1 tablet (10 mg total) by mouth once daily. 90 tablet 3    cyclobenzaprine (FLEXERIL) 5 MG tablet Take 1 tablet (5 mg total) by mouth nightly as needed for Muscle spasms. 30 tablet 5    guaiFENesin (MUCINEX) 600 mg 12 hr tablet Take 2 tablets (1,200 mg total) by mouth 2 (two) times daily as needed (cough). 40 tablet 0    ibuprofen (ADVIL,MOTRIN) 600 MG tablet Take 1 tablet (600 mg total) by mouth every 8 (eight) hours as needed for Pain. 20 tablet 0    mometasone (NASONEX) 50 mcg/actuation nasal spray 1-2 sprays by Nasal route once daily. 17 g 11       Past Surgical History:   Procedure Laterality Date    CARPAL TUNNEL RELEASE Right 1990s     "COLONOSCOPY N/A 2020    Procedure: COLONOSCOPY;  Surgeon: Pieter Gomez MD;  [facility]  Service: Endoscopy;  Laterality: N/A;  patient due for colonoscopy in 2020  prep ins. emailed  and mailed - COVID screening on 20 [facility]  patient confirmed new arrival time of 0900-BB    ESOPHAGOGASTRODUODENOSCOPY N/A 2023    Procedure: EGD (ESOPHAGOGASTRODUODENOSCOPY);  Surgeon: Pieter Gomez MD;  Location: [facility] ([address] [place] [address]);  Service: Endoscopy;  Laterality: N/A;  time frame 5-12 weeks  dr. gomez patient  prep instructions given to pt in clininc and via portal -jm  pt is hypoglycemic   precall complete-ml    OOPHORECTOMY Right        Social History     Socioeconomic History    Marital status: Single    Number of children: 1   Occupational History    Occupation: Works Security     Employer: [employer]   Tobacco Use    Smoking status: Never     Passive exposure: Never    Smokeless tobacco: Never   Substance and Sexual Activity    Alcohol use: Yes     Comment: rare    Drug use: Never    Sexual activity: Yes     Partners: Male   Social History Narrative    The patient does exercise regularly ([id number]).      She is not satisfied with weight.    Rates diet as good.    She does drink at least 1/2 gallon water daily.    She drinks 0 coffee/tea/caffeine-containing soft drinks daily.    Total sleep time at night is 8 hours.    She works 40 hours per week.    She does wear seat belts.    Hobbies include none.         CBC: No results for input(s): "WBC", "RBC", "HGB", "HCT", "PLT", "MCV", "MCH", "MCHC" in the last 72 hours.    CMP: No results for input(s): "NA", "K", "CL", "CO2", "BUN", "CREATININE", "GLU", "MG", "PHOS", "CALCIUM", "ALBUMIN", "PROT", "ALKPHOS", "ALT", "AST", "BILITOT" in the last 72 hours.    INR  No results for input(s): "PT", "INR", "PROTIME", "APTT" in the last 72 hours.        Diagnostic Studies:      EKD Echo:  No " results found for this or any previous visit.        Pre-op Assessment    I have reviewed the Patient Summary Reports.     I have reviewed the Nursing Notes. I have reviewed the NPO Status.   I have reviewed the Medications.     Review of Systems  Anesthesia Hx:  No problems with previous Anesthesia   History of prior surgery of interest to airway management or planning:          Denies Family Hx of Anesthesia complications.    Denies Personal Hx of Anesthesia complications.                    Hematology/Oncology:       -- Denies Anemia:                                  Cardiovascular:      Denies Hypertension.    Denies CAD.                                        Pulmonary:    Denies COPD.  Denies Asthma.     Denies Sleep Apnea.                Renal/:   Denies Chronic Renal Disease.                Hepatic/GI:    Hiatal Hernia, GERD Denies Liver Disease.            Neurological:    Denies CVA.    Denies Seizures.                                Endocrine:  Denies Diabetes.           Psych:  Psychiatric History                  Physical Exam  General: Well nourished, Cooperative, Alert and Oriented    Airway:  Mallampati: III / II  Mouth Opening: Normal  TM Distance: Normal  Tongue: Normal  Neck ROM: Normal ROM    Dental:  Intact    Chest/Lungs:  Clear to auscultation    Heart:  Rate: Normal  Rhythm: Regular Rhythm        Anesthesia Plan  Type of Anesthesia, risks & benefits discussed:    Anesthesia Type: Gen Natural Airway  Intra-op Monitoring Plan: Standard ASA Monitors  Post Op Pain Control Plan: multimodal analgesia  Induction:  IV  Informed Consent: Informed consent signed with the Patient and all parties understand the risks and agree with anesthesia plan.  All questions answered.   ASA Score: 2  Day of Surgery Review of History & Physical: H&P Update referred to the surgeon/provider.    Ready For Surgery From Anesthesia Perspective.     .

## 2024-02-26 NOTE — TRANSFER OF CARE
Anesthesia Transfer of Care Note    Patient: Val Horvath    Procedure(s) Performed: Procedure(s) (LRB):  EGD (ESOPHAGOGASTRODUODENOSCOPY) (N/A)    Patient location: [facility]    Anesthesia Type: general    Transport from OR: Transported from OR on 2-3 L/min O2 by NC with adequate spontaneous ventilation    Post pain: adequate analgesia    Post assessment: no apparent anesthetic complications and tolerated procedure well    Post vital signs: stable    Level of consciousness: awake, alert and oriented    Nausea/Vomiting: no nausea/vomiting    Complications: none    Transfer of care protocol was followed      Last vitals: Visit Vitals  /63 (BP Location: Left arm, Patient Position: Lying)   Pulse 81   Temp 36.5 °C (97.7 °F) (Temporal)   Resp 16   Ht 5' (1.524 m)   Wt 64.9 kg (143 lb)   LMP 01/01/2000 (Within Months)   SpO2 99%   Breastfeeding No   BMI 27.93 kg/m²

## 2024-02-29 ENCOUNTER — TELEPHONE (OUTPATIENT)
Dept: INTERNAL MEDICINE | Facility: CLINIC | Age: 64
End: 2024-02-29
Payer: COMMERCIAL

## 2024-02-29 ENCOUNTER — OFFICE VISIT (OUTPATIENT)
Dept: URGENT CARE | Facility: CLINIC | Age: 64
End: 2024-02-29
Payer: COMMERCIAL

## 2024-02-29 VITALS
HEIGHT: 60 IN | RESPIRATION RATE: 17 BRPM | HEART RATE: 83 BPM | BODY MASS INDEX: 30.26 KG/M2 | SYSTOLIC BLOOD PRESSURE: 122 MMHG | TEMPERATURE: 98 F | WEIGHT: 154.13 LBS | DIASTOLIC BLOOD PRESSURE: 73 MMHG | OXYGEN SATURATION: 97 %

## 2024-02-29 DIAGNOSIS — R05.1 ACUTE COUGH: ICD-10-CM

## 2024-02-29 DIAGNOSIS — J22 LOWER RESPIRATORY INFECTION: Primary | ICD-10-CM

## 2024-02-29 DIAGNOSIS — J02.9 SORE THROAT: ICD-10-CM

## 2024-02-29 DIAGNOSIS — R09.81 NASAL CONGESTION: ICD-10-CM

## 2024-02-29 PROCEDURE — 71046 X-RAY EXAM CHEST 2 VIEWS: CPT | Mod: S$GLB,,, | Performed by: RADIOLOGY

## 2024-02-29 PROCEDURE — 99214 OFFICE O/P EST MOD 30 MIN: CPT | Mod: S$GLB,,, | Performed by: NURSE PRACTITIONER

## 2024-02-29 RX ORDER — BENZONATATE 100 MG/1
200 CAPSULE ORAL 3 TIMES DAILY PRN
Qty: 60 CAPSULE | Refills: 0 | Status: SHIPPED | OUTPATIENT
Start: 2024-02-29 | End: 2024-03-10

## 2024-02-29 RX ORDER — INFLUENZA A VIRUS A/GEORGIA/12/2022 CVR-167 (H1N1) ANTIGEN (MDCK CELL DERIVED, PROPIOLACTONE INACTIVATED), INFLUENZA A VIRUS A/DARWIN/11/2021 (H3N2) ANTIGEN (MDCK CELL DERIVED, PROPIOLACTONE INACTIVATED),INFLUENZA B VIRUS B/SINGAPORE/WUH4618/2021 ANTIGEN (MDCK CELL DERIVED, PROPIOLACTONE INACTIVATED),INFLUENZA B VIRUS B/SINGAPORE/INFTT-16-0610/2016 ANTIGEN (MDCK CELL DERIVED, PROPIOLACTONE INACTIVATED) 15; 15; 15; 15 UG/.5ML; UG/.5ML; UG/.5ML; UG/.5ML
INJECTION, SUSPENSION INTRAMUSCULAR
COMMUNITY
Start: 2023-11-04

## 2024-02-29 RX ORDER — ALBUTEROL SULFATE 1.25 MG/3ML
1.25 SOLUTION RESPIRATORY (INHALATION) EVERY 6 HOURS PRN
Qty: 75 ML | Refills: 0 | Status: SHIPPED | OUTPATIENT
Start: 2024-02-29 | End: 2025-02-28

## 2024-02-29 RX ORDER — IPRATROPIUM BROMIDE 0.5 MG/2.5ML
0.5 SOLUTION RESPIRATORY (INHALATION)
Status: COMPLETED | OUTPATIENT
Start: 2024-02-29 | End: 2024-02-29

## 2024-02-29 RX ORDER — PROMETHAZINE HYDROCHLORIDE AND DEXTROMETHORPHAN HYDROBROMIDE 6.25; 15 MG/5ML; MG/5ML
5 SYRUP ORAL EVERY 6 HOURS PRN
Qty: 118 ML | Refills: 0 | Status: SHIPPED | OUTPATIENT
Start: 2024-02-29 | End: 2024-03-10

## 2024-02-29 RX ORDER — ALBUTEROL SULFATE 90 UG/1
2 AEROSOL, METERED RESPIRATORY (INHALATION) EVERY 6 HOURS PRN
Qty: 18 G | Refills: 0 | Status: SHIPPED | OUTPATIENT
Start: 2024-02-29 | End: 2024-03-04 | Stop reason: SDUPTHER

## 2024-02-29 RX ORDER — DOXYCYCLINE 100 MG/1
100 CAPSULE ORAL EVERY 12 HOURS
Qty: 20 CAPSULE | Refills: 0 | Status: SHIPPED | OUTPATIENT
Start: 2024-02-29 | End: 2024-03-10

## 2024-02-29 RX ORDER — KETOROLAC TROMETHAMINE 30 MG/ML
30 INJECTION, SOLUTION INTRAMUSCULAR; INTRAVENOUS
Status: DISCONTINUED | OUTPATIENT
Start: 2024-02-29 | End: 2024-02-29

## 2024-02-29 RX ORDER — IPRATROPIUM BROMIDE 21 UG/1
2 SPRAY, METERED NASAL 2 TIMES DAILY
Qty: 30 ML | Refills: 0 | Status: SHIPPED | OUTPATIENT
Start: 2024-02-29 | End: 2024-03-07

## 2024-02-29 RX ADMIN — IPRATROPIUM BROMIDE 0.5 MG: 0.5 SOLUTION RESPIRATORY (INHALATION) at 06:02

## 2024-02-29 NOTE — TELEPHONE ENCOUNTER
----- Message from Reilly Gallo sent at 2/29/2024 11:13 AM CST -----  Contact: [phone]  1MEDICALADVICE     Patient is calling for Medical Advice regarding: cough and chest    How long has patient had these symptoms: 2 days    Pharmacy name and phone#:   [id number] - [address] AT [address] [address]  Phone: [phone] Fax: [phone]        Would like response via basnohart: call    Comments: Patient also needs a Hosp F/U appt

## 2024-02-29 NOTE — LETTER
February 29, 2024      [facility] [address] [address]  Phone: [phone]  Fax: [phone]       Patient: Val Horvath   YOB: 1960  Date of Visit: 02/29/2024    To Whom It May Concern:    Haydee Horvath  was at [facility] on 02/29/2024. The patient may return to work/school on 03/02/2024 with no restrictions. If you have any questions or concerns, or if I can be of further assistance, please do not hesitate to contact me.    Sincerely,    Abigail Franco, DNP

## 2024-02-29 NOTE — PROGRESS NOTES
Subjective:      Patient ID: Val Horvath is a 63 y.o. female.    Vitals:  height is 5' (1.524 m) and weight is 69.9 kg (154 lb 1.6 oz). Her oral temperature is 98.3 °F (36.8 °C). Her blood pressure is 122/73 and her pulse is 83. Her respiration is 17 and oxygen saturation is 97%.     Chief Complaint: Follow-up    Pt presents today w/ productive cough (yellow sputum) accompanied w/ nasal congestion and sore throat; onset approx 2x weeks ago. Pt c/o chest pain when coughing , fatigue and headache. Pt was  seen 2x weeks ago; Dx Lower respiratory infection and Rx  augmentin, tessalon and promethazine syrup;no relief. Pt states sx have significantly worsened and couldn't get an appt. with PCP til next month. Pt reports completion of oral antibiotic, cough persists, yellow sputum,    Follow-up  This is a recurrent problem. The current episode started 1 to 4 weeks ago. The problem occurs constantly. The problem has been unchanged. Associated symptoms include chest pain, congestion, coughing, fatigue, headaches and a sore throat. Pertinent negatives include no abdominal pain, anorexia, arthralgias, change in bowel habit, chills, diaphoresis, fever, joint swelling, myalgias, nausea, neck pain, numbness, rash, swollen glands, urinary symptoms, vertigo, visual change, vomiting or weakness. The symptoms are aggravated by coughing.       Constitution: Positive for fatigue. Negative for chills, sweating and fever.   HENT:  Positive for congestion and sore throat.    Neck: Negative for neck pain.   Cardiovascular:  Positive for chest pain.   Respiratory:  Positive for cough.    Gastrointestinal:  Negative for abdominal pain, nausea and vomiting.   Musculoskeletal:  Negative for joint pain, joint swelling and muscle ache.   Skin:  Negative for rash.   Neurological:  Positive for headaches. Negative for history of vertigo and numbness.      Objective:     Physical Exam   Constitutional: She is oriented to person, place, and  time. She appears well-developed. She is cooperative.  Non-toxic appearance. She does not appear ill. No distress.   HENT:   Head: Normocephalic and atraumatic.   Ears:   Right Ear: Hearing, tympanic membrane, external ear and ear canal normal.   Left Ear: Hearing, tympanic membrane, external ear and ear canal normal.   Nose: Mucosal edema present. No rhinorrhea or nasal deformity. No epistaxis. Right sinus exhibits no maxillary sinus tenderness and no frontal sinus tenderness. Left sinus exhibits no maxillary sinus tenderness and no frontal sinus tenderness.   Mouth/Throat: Uvula is midline, oropharynx is clear and moist and mucous membranes are normal. No trismus in the jaw. Normal dentition. No uvula swelling. No oropharyngeal exudate, posterior oropharyngeal edema or posterior oropharyngeal erythema.   Eyes: Conjunctivae and lids are normal. Pupils are equal, round, and reactive to light. No scleral icterus. Extraocular movement intact   Neck: Trachea normal and phonation normal. Neck supple. No thyromegaly present. No edema present. No erythema present. No neck rigidity present.   Cardiovascular: Normal rate, regular rhythm, S1 normal, S2 normal, normal heart sounds and normal pulses.   Pulmonary/Chest: Effort normal. No tachypnea. No respiratory distress. She has decreased breath sounds in the right lower field and the left lower field. She has no wheezes. She has rhonchi in the right lower field and the left lower field.   Pt in no acute distress, pulse ox 97%, non - diaphoretic         Comments: Pt in no acute distress, pulse ox 97%, non - diaphoretic    Abdominal: Normal appearance and bowel sounds are normal. Soft. flat abdomen   Musculoskeletal: Normal range of motion.         General: No deformity. Normal range of motion.   Lymphadenopathy:     She has no cervical adenopathy.   Neurological: She is alert and oriented to person, place, and time. She exhibits normal muscle tone. Coordination normal.    Skin: Skin is warm, dry, intact, not diaphoretic and not pale.   Psychiatric: Her speech is normal and behavior is normal. Judgment and thought content normal.   Nursing note and vitals reviewed.        Assessment:     1. Lower respiratory infection    2. Acute cough    3. Nasal congestion    4. Sore throat      Supportive Care done, pt to obtain CXR at [facility] tonight.  Sats 97 % upon discharge.    19:48 pm Pt called and informed of chest x ray findings,. Instructed pt to start prescribed medication and albuterol inhaler. Pt to follow up with PCP  Plan:       Lower respiratory infection  -     albuterol (ACCUNEB) 1.25 mg/3 mL Nebu; Take 3 mLs (1.25 mg total) by nebulization every 6 (six) hours as needed. Rescue  Dispense: 75 mL; Refill: 0  -     ipratropium 0.02 % nebulizer solution 0.5 mg  -     albuterol (VENTOLIN HFA) 90 mcg/actuation inhaler; Inhale 2 puffs into the lungs every 6 (six) hours as needed for Wheezing. Rescue  Dispense: 18 g; Refill: 0  -     XR CHEST PA AND LATERAL; Future; Expected date: 02/29/2024  -     doxycycline (VIBRAMYCIN) 100 MG Cap; Take 1 capsule (100 mg total) by mouth every 12 (twelve) hours. for 10 days  Dispense: 20 capsule; Refill: 0    Acute cough  -     benzonatate (TESSALON) 100 MG capsule; Take 2 capsules (200 mg total) by mouth 3 (three) times daily as needed.  Dispense: 60 capsule; Refill: 0  -     promethazine-dextromethorphan (PROMETHAZINE-DM) 6.25-15 mg/5 mL Syrp; Take 5 mLs by mouth every 6 (six) hours as needed.  Dispense: 118 mL; Refill: 0    Nasal congestion  -     ipratropium (ATROVENT) 21 mcg (0.03 %) nasal spray; 2 sprays by Each Nostril route 2 (two) times daily. for 7 days  Dispense: 30 mL; Refill: 0    Sore throat  -     Discontinue: ketorolac injection 30 mg      Patient Instructions   Discharge instructions for cough and lower respiratory infection  Chest x Ray ordered  pt to go to [facility] on [address]   Address: [facility]  [address]  [facility]  [phone]    Push fluids, hot teas, warm soups, gator aide, maintain hydration.    Cough    What care is needed at home?   Ask your doctor what you need to do when you go home. Make sure you ask questions if you do not understand what the doctor says.  To help you feel better:  Use a cool mist humidifier to avoid breathing dry air.  Use hard candy or cough drops to soothe sore throat and cough.  Gargle with salt water (mix 1/2 teaspoon salt with 1 cup warm water) a few times a day.  Spray saltwater mist in each nostril. Any normal saline spray works.  Sip warm liquids to keep your throat moist.  Take warm, steamy showers to help soothe the cough.  Do not smoke or be in smoke-filled places. Avoid things that may cause breathing problems like vaping, fumes, pollution, dust, and other common allergens.  You may want to use over-the-counter medicines for allergies or acid reflux if your cough is due to one of these problems.  You can also use an over-the-counter cough medicine.    Lower Respiratory infection  What are the symptoms to watch for -- Common symptoms include:  Cough  Fever (temperature higher than 100.4°F or 38°C)  Trouble breathing  Pain when you take a deep breath  A fast heartbeat  Shaking chills  When people with pneumonia cough, they often cough up phlegm or mucus.      1) See orders for this visit as documented in the electronic medical record.  2) Symptomatic therapy suggested: use acetaminophen/ibuprofen every 6-8 hours prn pain or fever, push fluids.   3) Call or return to clinic prn if these symptoms worsen or fail to improve as anticipated.    Discussed results/diagnosis/plan with patient in clinic.  We had shared decision making for patient's treatment. Patient verbalized understanding and in agreement with current treatment plan.     Patient was instructed to return for re-evaluation with urgent care or PCP for continued outpatient workup and management if symptoms do  not improve/worsening symptoms. Strict ED versus clinic precautions given in depth.    Discharge and follow-up instructions given verbally/printed with the patient who expressed understanding. The instructions and results are also available on [facility].      - You must understand that you have received an Urgent Care treatment only and that you may be released before all of your medical problems are known or treated.   - You, the patient, will arrange for follow up care as instructed.   - Follow up with your PCP or specialty clinic as directed in the next 1-2 weeks if not improved or as needed.  You can call [phone] to schedule an appointment with the appropriate provider.   - If your condition worsens or fails to improve we recommend that you receive another evaluation at the ER immediately or contact your PCP to discuss your concerns or return here.        SHAYLEE Pruett

## 2024-03-01 NOTE — PATIENT INSTRUCTIONS
Discharge instructions for cough and lower respiratory infection  Chest x Ray ordered  pt to go to [facility] on [address]   Address: [facility]  [address] [facility]  [phone]    Push fluids, hot teas, warm soups, gator aide, maintain hydration.    Cough    What care is needed at home?   Ask your doctor what you need to do when you go home. Make sure you ask questions if you do not understand what the doctor says.  To help you feel better:  Use a cool mist humidifier to avoid breathing dry air.  Use hard candy or cough drops to soothe sore throat and cough.  Gargle with salt water (mix 1/2 teaspoon salt with 1 cup warm water) a few times a day.  Spray saltwater mist in each nostril. Any normal saline spray works.  Sip warm liquids to keep your throat moist.  Take warm, steamy showers to help soothe the cough.  Do not smoke or be in smoke-filled places. Avoid things that may cause breathing problems like vaping, fumes, pollution, dust, and other common allergens.  You may want to use over-the-counter medicines for allergies or acid reflux if your cough is due to one of these problems.  You can also use an over-the-counter cough medicine.    Lower Respiratory infection  What are the symptoms to watch for -- Common symptoms include:  Cough  Fever (temperature higher than 100.4°F or 38°C)  Trouble breathing  Pain when you take a deep breath  A fast heartbeat  Shaking chills  When people with pneumonia cough, they often cough up phlegm or mucus.      1) See orders for this visit as documented in the electronic medical record.  2) Symptomatic therapy suggested: use acetaminophen/ibuprofen every 6-8 hours prn pain or fever, push fluids.   3) Call or return to clinic prn if these symptoms worsen or fail to improve as anticipated.    Discussed results/diagnosis/plan with patient in clinic.  We had shared decision making for patient's treatment. Patient verbalized understanding and in agreement  with current treatment plan.     Patient was instructed to return for re-evaluation with urgent care or PCP for continued outpatient workup and management if symptoms do not improve/worsening symptoms. Strict ED versus clinic precautions given in depth.    Discharge and follow-up instructions given verbally/printed with the patient who expressed understanding. The instructions and results are also available on [employer].      - You must understand that you have received an Urgent Care treatment only and that you may be released before all of your medical problems are known or treated.   - You, the patient, will arrange for follow up care as instructed.   - Follow up with your PCP or specialty clinic as directed in the next 1-2 weeks if not improved or as needed.  You can call [phone] to schedule an appointment with the appropriate provider.   - If your condition worsens or fails to improve we recommend that you receive another evaluation at the ER immediately or contact your PCP to discuss your concerns or return here.        SHAYLEE Pruett

## 2024-03-04 DIAGNOSIS — J22 LOWER RESPIRATORY INFECTION: ICD-10-CM

## 2024-03-04 RX ORDER — PEN NEEDLE, DIABETIC 31 GX5/16"
NEEDLE, DISPOSABLE MISCELLANEOUS
Qty: 1 EACH | Refills: 0 | Status: SHIPPED | OUTPATIENT
Start: 2024-03-04

## 2024-03-04 RX ORDER — ALBUTEROL SULFATE 90 UG/1
2 AEROSOL, METERED RESPIRATORY (INHALATION) EVERY 6 HOURS PRN
Qty: 18 G | Refills: 2 | Status: SHIPPED | OUTPATIENT
Start: 2024-03-04 | End: 2025-03-04

## 2024-03-04 NOTE — TELEPHONE ENCOUNTER
The patient went to urgent care. Nebulizer medication was ordered but no orders for the machine.    Please order a nebulizer machine and send Rx to the pharmacy.  The patient states that she needs a refill on the Albuterol inhaler.

## 2024-03-04 NOTE — TELEPHONE ENCOUNTER
No care due was identified.  [facility] Embedded Care Due Messages. Reference number: [id number].   3/04/2024 10:45:42 AM CST

## 2024-03-04 NOTE — TELEPHONE ENCOUNTER
----- Message from Cuong Landeros sent at 3/4/2024  8:52 AM CST -----  Contact: self  [phone]  Per pt when was told to go to urgent care Thursday stated provider gave her meds but she do not have a asthma machine.    Please call and advise

## 2024-03-04 NOTE — TELEPHONE ENCOUNTER
The patient informed that a Rx was sent to her pharmacy for a nebulizer machine and inhaler.  The patient verbalized understanding.

## 2024-03-18 RX ORDER — ALPRAZOLAM 0.25 MG/1
0.25 TABLET ORAL 2 TIMES DAILY PRN
Qty: 60 TABLET | Refills: 0 | OUTPATIENT
Start: 2024-03-18

## 2024-03-18 NOTE — TELEPHONE ENCOUNTER
No care due was identified.  [facility] Embedded Care Due Messages. Reference number: [id number].   3/18/2024 9:49:41 AM CDT

## 2024-03-19 ENCOUNTER — TELEPHONE (OUTPATIENT)
Dept: OBSTETRICS AND GYNECOLOGY | Facility: CLINIC | Age: 64
End: 2024-03-19

## 2024-03-19 ENCOUNTER — TELEPHONE (OUTPATIENT)
Dept: OBSTETRICS AND GYNECOLOGY | Facility: CLINIC | Age: 64
End: 2024-03-19
Payer: COMMERCIAL

## 2024-03-19 DIAGNOSIS — B37.31 CANDIDA VAGINITIS: Primary | ICD-10-CM

## 2024-03-19 RX ORDER — ALPRAZOLAM 0.25 MG/1
0.25 TABLET ORAL 2 TIMES DAILY PRN
Qty: 60 TABLET | Refills: 0 | Status: CANCELLED | OUTPATIENT
Start: 2024-03-19

## 2024-03-19 RX ORDER — TERCONAZOLE 4 MG/G
1 CREAM VAGINAL NIGHTLY
Qty: 45 G | Refills: 0 | Status: SHIPPED | OUTPATIENT
Start: 2024-03-19

## 2024-03-19 NOTE — TELEPHONE ENCOUNTER
Spoke with pt in regards to possible yeast infection, Appt offered, pt declined. Informed pt her request for medication will be sent to provider. Pt verbalized understanding.

## 2024-03-19 NOTE — TELEPHONE ENCOUNTER
----- Message from Ana Laura Whyte sent at 3/19/2024 10:16 AM CDT -----  Name of Who is Calling:YESSICA LOZA [[id number]]                What is the request in detail: Pt calling because she have a yeast infection medication.Please call back to further assist.        [id number] - [facility] [id number] [place] [address] AT [facility]         Can the clinic reply by [facility]: No                What Number to Call Back if not in [facility]: [phone]

## 2024-03-19 NOTE — TELEPHONE ENCOUNTER
No care due was identified.  [facility] Embedded Care Due Messages. Reference number: [id number].   3/19/2024 12:24:03 PM CDT

## 2024-03-28 ENCOUNTER — OFFICE VISIT (OUTPATIENT)
Dept: INTERNAL MEDICINE | Facility: CLINIC | Age: 64
End: 2024-03-28
Payer: COMMERCIAL

## 2024-03-28 VITALS
SYSTOLIC BLOOD PRESSURE: 122 MMHG | HEART RATE: 74 BPM | OXYGEN SATURATION: 95 % | DIASTOLIC BLOOD PRESSURE: 68 MMHG | RESPIRATION RATE: 12 BRPM | TEMPERATURE: 98 F | HEIGHT: 60 IN | WEIGHT: 145.5 LBS | BODY MASS INDEX: 28.57 KG/M2

## 2024-03-28 DIAGNOSIS — F41.1 GENERALIZED ANXIETY DISORDER WITH PANIC ATTACKS: Primary | ICD-10-CM

## 2024-03-28 DIAGNOSIS — F41.0 GENERALIZED ANXIETY DISORDER WITH PANIC ATTACKS: Primary | ICD-10-CM

## 2024-03-28 PROCEDURE — 3008F BODY MASS INDEX DOCD: CPT | Mod: CPTII,S$GLB,, | Performed by: INTERNAL MEDICINE

## 2024-03-28 PROCEDURE — 99213 OFFICE O/P EST LOW 20 MIN: CPT | Mod: S$GLB,,, | Performed by: INTERNAL MEDICINE

## 2024-03-28 PROCEDURE — 99999 PR PBB SHADOW E&M-EST. PATIENT-LVL IV: CPT | Mod: PBBFAC,,, | Performed by: INTERNAL MEDICINE

## 2024-03-28 PROCEDURE — 1159F MED LIST DOCD IN RCRD: CPT | Mod: CPTII,S$GLB,, | Performed by: INTERNAL MEDICINE

## 2024-03-28 PROCEDURE — 3078F DIAST BP <80 MM HG: CPT | Mod: CPTII,S$GLB,, | Performed by: INTERNAL MEDICINE

## 2024-03-28 PROCEDURE — 3074F SYST BP LT 130 MM HG: CPT | Mod: CPTII,S$GLB,, | Performed by: INTERNAL MEDICINE

## 2024-03-28 RX ORDER — ALPRAZOLAM 0.25 MG/1
0.25 TABLET ORAL 2 TIMES DAILY PRN
Qty: 60 TABLET | Refills: 0 | Status: SHIPPED | OUTPATIENT
Start: 2024-03-28 | End: 2024-04-23 | Stop reason: SDUPTHER

## 2024-03-28 NOTE — PROGRESS NOTES
History of present illness:   [age] year lady established patient of .  The patient has generalized anxiety disorder and is on chronic alprazolam therapy.  She takes this as directed b.i.d..  Today she comes in needing a refill and an updated face-to-face visit.  She reports no new particular concerns problems or complaints.  She takes her medications as directed.  Confirmed she is taking regularly and is prescribe such by her PCP.    Current medications:  Medications all noted reviewed.    [place]  is also reviewed with no irregularities.    Review of systems:   Constitutional:  No fever no chills.    Cardiovascular:  No chest pain palpitations or syncope.    Psychiatric:  All is stable and baseline with her current therapy.  Musculoskeletal continued problems with pain in her left hip for trochanteric bursitis.  She did receive an injection couple of months ago.  She is requesting temporary handicap license placed to use p.r.n. since she has a long walk from parking to her workplace      Physical examination:  General:  Alert female no acute distress.    Vital signs: All noted reviewed is normal.    Mental status:  Alert oriented affect mood all appropriate.  No psychotic thinking.  Insight appears to be good.    Impression:  Anxiety disorder stable alprazolam therapy      Plan:  She is given a one month refill of her alprazolam.  [place]  is reviewed with no irregularities.    She is given handicap form as requested temporary.

## 2024-04-23 RX ORDER — ALPRAZOLAM 0.25 MG/1
0.25 TABLET ORAL 2 TIMES DAILY PRN
Qty: 60 TABLET | Refills: 2 | Status: SHIPPED | OUTPATIENT
Start: 2024-04-23

## 2024-04-23 NOTE — TELEPHONE ENCOUNTER
----- Message from Destiny Carbone sent at 4/23/2024 11:01 AM CDT -----  Contact: Patient [phone]  Requesting an RX refill or new RX.  Is this a refill or new RX:   RX name and strength ALPRAZolam (XANAX) 0.25 MG tablet  Is this a 30 day or 90 day RX:   Pharmacy name and phone #   [facility] #[id number] - [address] [address]  Phone: [phone] Fax: [phone]         The doctors have asked that we provide their patients with the following 2 reminders -- prescription refills can take up to 72 hours, and a friendly reminder that in the future you can use your [facility] account to request refills: yes      Comment: Patient would like Ms Monge to call

## 2024-04-23 NOTE — TELEPHONE ENCOUNTER
No care due was identified.  Health [facility] Embedded Care Due Messages. Reference number: [id number].   4/23/2024 11:09:37 AM CDT

## 2024-04-24 ENCOUNTER — TELEPHONE (OUTPATIENT)
Dept: INTERNAL MEDICINE | Facility: CLINIC | Age: 64
End: 2024-04-24
Payer: COMMERCIAL

## 2024-04-24 ENCOUNTER — HOSPITAL ENCOUNTER (OUTPATIENT)
Dept: RADIOLOGY | Facility: HOSPITAL | Age: 64
Discharge: HOME | End: 2024-04-24
Attending: PHYSICIAN ASSISTANT
Payer: COMMERCIAL

## 2024-04-24 ENCOUNTER — OFFICE VISIT (OUTPATIENT)
Dept: ORTHOPEDICS | Facility: CLINIC | Age: 64
End: 2024-04-24
Payer: COMMERCIAL

## 2024-04-24 DIAGNOSIS — M54.42 ACUTE LEFT-SIDED BACK PAIN WITH SCIATICA: ICD-10-CM

## 2024-04-24 DIAGNOSIS — M54.42 ACUTE LEFT-SIDED BACK PAIN WITH SCIATICA: Primary | ICD-10-CM

## 2024-04-24 PROCEDURE — 1159F MED LIST DOCD IN RCRD: CPT | Mod: CPTII,S$GLB,, | Performed by: PHYSICIAN ASSISTANT

## 2024-04-24 PROCEDURE — 99999 PR PBB SHADOW E&M-EST. PATIENT-LVL IV: CPT | Mod: PBBFAC,,, | Performed by: PHYSICIAN ASSISTANT

## 2024-04-24 PROCEDURE — 72110 X-RAY EXAM L-2 SPINE 4/>VWS: CPT | Mod: TC

## 2024-04-24 PROCEDURE — 1160F RVW MEDS BY RX/DR IN RCRD: CPT | Mod: CPTII,S$GLB,, | Performed by: PHYSICIAN ASSISTANT

## 2024-04-24 PROCEDURE — 72110 X-RAY EXAM L-2 SPINE 4/>VWS: CPT | Mod: 26,,, | Performed by: RADIOLOGY

## 2024-04-24 PROCEDURE — 99214 OFFICE O/P EST MOD 30 MIN: CPT | Mod: S$GLB,,, | Performed by: PHYSICIAN ASSISTANT

## 2024-04-24 RX ORDER — METHYLPREDNISOLONE 4 MG/1
TABLET ORAL
Qty: 1 EACH | Refills: 0 | Status: SHIPPED | OUTPATIENT
Start: 2024-04-24

## 2024-04-24 NOTE — TELEPHONE ENCOUNTER
----- Message from Noelle Duran sent at 4/24/2024 10:48 AM CDT -----  Contact: [phone]  1MEDICALADVICE     Patient is calling for Medical Advice regarding: Questions about meds Please call pt back she said she left a message on yesterday     How long has patient had these symptoms:    Pharmacy name and phone#:  [facility] #[id number] - [address] AT [address] [address]  Phone: [phone] Fax: [phone]      Would like response via Anageart:  call back     Comments:    Please call pt back today she never got a call back from her message from yesterday

## 2024-04-24 NOTE — LETTER
April 24, 2024      [facility] 5th [facility]  [address]  [facility] [address]  Phone: [phone]  Fax: [phone]       Patient: Val Horvath   YOB: 1960  Date of Visit: 04/24/2024    To Whom It May Concern:    Haydee Horvath  was at [facility] on 04/24/2024. The patient may return to work/school on 04/29/2024 with no restrictions. If you have any questions or concerns, or if I can be of further assistance, please do not hesitate to contact me.    Sincerely,          Hao Ocasio PA-C

## 2024-04-24 NOTE — TELEPHONE ENCOUNTER
I spoke to the patient and she is requesting a f/u medication appointment.  Patient informed that she is not due for another 6 months.  However she has an annual visit scheduled for July.

## 2024-04-24 NOTE — TELEPHONE ENCOUNTER
----- Message from Noelle Duran sent at 4/24/2024 10:48 AM CDT -----  Contact: [phone]  1MEDICALADVICE     Patient is calling for Medical Advice regarding: Questions about meds Please call pt back she said she left a message on yesterday     How long has patient had these symptoms:    Pharmacy name and phone#:  [email or web address] #[id number] - [address] AT [address] [address]  Phone: [phone] Fax: [phone]      Would like response via [email or web address]:  call back     Comments:    Please call pt back today she never got a call back from her message from yesterday

## 2024-04-25 NOTE — PROGRESS NOTES
SUBJECTIVE:     Chief Complaint & History of Present Illness:  Val Horvath is a Established  patient 63 y.o. female who is seen here today with a complaint of    Chief Complaint   Patient presents with    Left Hip - Pain    .  She is patient well-known to us was last seen treated the clinic 01/11/2024 for left hip pain.  She had undergone a cortisone injection of the left greater trochanteric bursa to help alleviate her symptoms and differentiate how much of this difficulties coming from her hip versus her low back pared states she only had a few weeks of relief from the injection has had return of pain pain is primarily in the lateral aspect of the hip with radiation up towards the base of her back and radiating down the leg to the knee particularly with periods of prolonged standing and ambulation.  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  she describes the pain as sore and achy.    Review of patient's allergies indicates:   Allergen Reactions    Codeine Itching    Sulfa (sulfonamide antibiotics) Itching         Current Outpatient Medications   Medication Sig Dispense Refill    acyclovir (ZOVIRAX) 400 MG tablet Take 1 tablet (400 mg total) by mouth 2 (two) times daily. 180 tablet 3    albuterol (ACCUNEB) 1.25 mg/3 mL Nebu Take 3 mLs (1.25 mg total) by nebulization every 6 (six) hours as needed. Rescue 75 mL 0    albuterol (VENTOLIN HFA) 90 mcg/actuation inhaler Inhale 2 puffs into the lungs every 6 (six) hours as needed for Wheezing. Rescue 18 g 2    ALPRAZolam (XANAX) 0.25 MG tablet Take 1 tablet (0.25 mg total) by mouth 2 (two) times daily as needed for Anxiety. 60 tablet 2    cetirizine (ZYRTEC) 10 MG tablet Take 1 tablet (10 mg total) by mouth once daily. 90 tablet 3    cyclobenzaprine (FLEXERIL) 5  MG tablet Take 1 tablet (5 mg total) by mouth nightly as needed for Muscle spasms. 30 tablet 5    erythromycin (ROMYCIN) ophthalmic ointment Place into the left eye 5 (five) times daily. 3.5 g 0    FLUCELVAX QUAD 1059-8531, PF, 60 mcg (15 mcg x 4)/0.5 mL Syrg       fluticasone propionate (FLONASE) 50 mcg/actuation nasal spray 1 Spray(s) Both Nares Daily PRN 18.2 mL 11    gabapentin (NEURONTIN) 300 MG capsule TAKE UP TO 6 CAPSULES BY MOUTH EVERY  capsule 11    guaiFENesin (MUCINEX) 600 mg 12 hr tablet Take 2 tablets (1,200 mg total) by mouth 2 (two) times daily as needed (cough). 40 tablet 0    ibuprofen (ADVIL,MOTRIN) 600 MG tablet Take 1 tablet (600 mg total) by mouth every 8 (eight) hours as needed for Pain. 20 tablet 0    methylPREDNISolone (MEDROL DOSEPACK) 4 mg tablet use as directed 1 each 0    mometasone (NASONEX) 50 mcg/actuation nasal spray 1-2 sprays by Nasal route once daily. 17 g 11    nebulizers Misc Use as instructed daily prn with nebulizer medication 1 each 0    pantoprazole (PROTONIX) 40 MG tablet Take 1 tablet (40 mg total) by mouth once daily. 90 tablet 3    terconazole (TERAZOL 7) 0.4 % Crea Place 1 applicator vaginally every evening. 45 g 0     No current facility-administered medications for this visit.       Past Medical History:   Diagnosis Date    Anxiety     AR (allergic rhinitis) 3/10/2014    Diverticulosis 2010    Family history of breast cancer in sister     Genital herpes     Hiatal hernia 2010    Irritable bowel syndrome     Meningioma 3/10/2014    MRI 2/14    Post herpetic neuralgia 1/21/2014 1/14        Past Surgical History:   Procedure Laterality Date    CARPAL TUNNEL RELEASE Right 1990s    COLONOSCOPY N/A 12/2/2020    Procedure: COLONOSCOPY;  Surgeon: Pieter Davenport MD;  Location: [facility] ([address] [place] [address]);  Service: Endoscopy;  Laterality: N/A;  patient due for colonoscopy in November 2020  prep ins. emailed  and mailed - COVID screening on 11/29/20 [facility]  [facility]  patient confirmed new arrival time of 0900-BB    ESOPHAGOGASTRODUODENOSCOPY N/A 12/1/2023    Procedure: EGD (ESOPHAGOGASTRODUODENOSCOPY);  Surgeon: Pieter Gmoez MD;  Location: [facility] (4TH FLR);  Service: Endoscopy;  Laterality: N/A;  time frame 5-12 weeks  dr. gomez patient  prep instructions given to pt in [facility] and via [facility]  pt is hypoglycemic  11/24 precall complete-ml    ESOPHAGOGASTRODUODENOSCOPY N/A 2/26/2024    Procedure: EGD (ESOPHAGOGASTRODUODENOSCOPY);  Surgeon: Pieter Gomez MD;  Location: [facility] (2ND FLR);  Service: Endoscopy;  Laterality: N/A;  [facility] pt  prep instructions sent to pt via portal    2/19-precall complete-MS    OOPHORECTOMY Right 1970s       Vital Signs (Most Recent)  There were no vitals filed for this visit.        Review of Systems:  ROS:  Constitutional: no fever or chills  Eyes: no visual changes  ENT: no nasal congestion or sore throat  Respiratory: no cough or shortness of breath  Cardiovascular: no chest pain or palpitations  Gastrointestinal: no nausea or vomiting, tolerating diet, positive for GERD, IBS, diverticulosis, hiatal hernia  Genitourinary: no hematuria or dysuria  Integument/Breast: no rash or pruritis  Hematologic/Lymphatic: no easy bruising or lymphadenopathy, positive for meningioma  Musculoskeletal: no arthralgias or myalgias  Neurological: no seizures or tremors, positive generalized headaches, post herpetic neuralgia  Behavioral/Psych: no auditory or visual hallucinations, generalized anxiety disorder with panic attacks  Endocrine: no heat or cold intolerance                OBJECTIVE:     PHYSICAL EXAM:     , General Appearance: Well nourished, well developed, in no acute distress.  Neurological: Mood & affect are normal.  Back Exam:      Tenderness: Lumbar   Swelling:  None       Range of Motion:      Flexion: 80     Extension: 50     Lateral Bend:   Right 50                              Left 50     Rotation:          Right 80                               Left 90       SLR:                  Right Negative                             Left Negative       Muscle Strength      Abductor: 5/5     Adductor: 5/5     Quadriceps: 5/5     Hamstrings: 5/5       Reflexes     Patellar: Normal    Achilles: Normal       Sensation: Normal   Gait: Normal     pain with motion noted during exam, negative straight-leg raise bilaterally , moderate tenderness to palpation of the paraspinal muscles surrounding L 4 L5-L5 S1 primarily on the right, normal reflexes and strength bilateral lower extremities, sensory exam intact bilateral lower extremities  no kyphosis or scoliosis      Hip Examination:  positives: tenderness over greater trochanter    RADIOGRAPHS:  X-rays lumbar spine taken today films reviewed by me demonstrate mild joint space narrowing at L5-S1     ASSESSMENT/PLAN:       ICD-10-CM ICD-9-CM   1. Acute left-sided back pain with sciatica  M54.42 724.3       Plan: We discussed with the patient at length all the different treatment options available for her spine including anti-inflammatories, acetaminophen, rest, ice, physical therapy, strengthening and range of motion exercise, occasional cortisone injections for temporary relief, and finally possible surgical interventions  Medrol Dosepak per package directions  Cyclobenzaprine 5 mg t.i.d. x2 days followed by q.p.m. p.r.n.  Consult Healthy Back program  Follow-up with spine service in 4 weeks if symptoms not significantly improved sooner if symptoms dictate

## 2024-05-20 NOTE — PROGRESS NOTES
DATE: 5/23/2024  PATIENT: Val Horvath    Supervising Physician: Harry Pizano M.D.    CHIEF COMPLAINT: low back and left leg pain    HISTORY:  Val Horvath is a 63 y.o. female here for initial evaluation of low back and left leg pain (Back - 8, Leg - 8).  The pain in the back of the left leg is what bothers her most.  The pain has been present for months without specific injury. The patient describes the pain as shooting.  The pain is worse with activity and improved by rest. There is negative associated numbness and tingling. There is negative subjective weakness. Prior treatments have included home exercises for 8 weeks in the last 6 months and gabapentin, but no ESIs or surgery. It is the [facility] spine conditioning program. Exercises include head rolls, kneeling back extension, sitting rotation stretch, modified seated side straddle, knee to chest, bird dog, plank, modified seated plank, hip bridges, abdominal bracing, and abdominal crunch. Pt completes each exercise daily for 1 hour with worsening of pain      The patient denies myelopathic symptoms such as handwriting changes or difficulty with buttons/coins/keys. Denies perineal paresthesias, bowel/bladder dysfunction.    PAST MEDICAL/SURGICAL HISTORY:  Past Medical History:   Diagnosis Date    Anxiety     AR (allergic rhinitis) 3/10/2014    Diverticulosis 2010    Family history of breast cancer in sister     Genital herpes     Hiatal hernia 2010    Irritable bowel syndrome     Meningioma 3/10/2014    MRI 2/14    Post herpetic neuralgia 1/21/2014 1/14      Past Surgical History:   Procedure Laterality Date    CARPAL TUNNEL RELEASE Right 1990s    COLONOSCOPY N/A 12/2/2020    Procedure: COLONOSCOPY;  Surgeon: Pieter Davenport MD;  Location: [facility] ([address] [place] [address]);  Service: Endoscopy;  Laterality: N/A;  patient due for colonoscopy in November 2020  prep ins. emailed  and mailed - COVID screening on 11/29/20 [facility]  patient confirmed new arrival  time of 0900-BB    ESOPHAGOGASTRODUODENOSCOPY N/A 12/1/2023    Procedure: EGD (ESOPHAGOGASTRODUODENOSCOPY);  Surgeon: Pieter Gomez MD;  Location: [facility] (4TH FLR);  Service: Endoscopy;  Laterality: N/A;  time frame 5-12 weeks  dr. gomez patient  prep instructions given to pt in [facility] and via portal -  pt is hypoglycemic  11/24 precall complete-ml    ESOPHAGOGASTRODUODENOSCOPY N/A 2/26/2024    Procedure: EGD (ESOPHAGOGASTRODUODENOSCOPY);  Surgeon: Pieter Gomez MD;  Location: [facility] (2ND FLR);  Service: Endoscopy;  Laterality: N/A;  [facility] pt  prep instructions sent to pt via portal    2/19-precall complete-MS    OOPHORECTOMY Right 1970s       Medications:   Current Outpatient Medications on File Prior to Visit   Medication Sig Dispense Refill    acyclovir (ZOVIRAX) 400 MG tablet Take 1 tablet (400 mg total) by mouth 2 (two) times daily. 180 tablet 3    albuterol (ACCUNEB) 1.25 mg/3 mL Nebu Take 3 mLs (1.25 mg total) by nebulization every 6 (six) hours as needed. Rescue 75 mL 0    albuterol (VENTOLIN HFA) 90 mcg/actuation inhaler Inhale 2 puffs into the lungs every 6 (six) hours as needed for Wheezing. Rescue 18 g 2    ALPRAZolam (XANAX) 0.25 MG tablet Take 1 tablet (0.25 mg total) by mouth 2 (two) times daily as needed for Anxiety. 60 tablet 2    cetirizine (ZYRTEC) 10 MG tablet Take 1 tablet (10 mg total) by mouth once daily. 90 tablet 3    cyclobenzaprine (FLEXERIL) 5 MG tablet Take 1 tablet (5 mg total) by mouth nightly as needed for Muscle spasms. 30 tablet 5    erythromycin (ROMYCIN) ophthalmic ointment Place into the left eye 5 (five) times daily. 3.5 g 0    FLUCELVAX QUAD 5964-7708, PF, 60 mcg (15 mcg x 4)/0.5 mL Syrg       fluticasone propionate (FLONASE) 50 mcg/actuation nasal spray 1 Spray(s) Both Nares Daily PRN 18.2 mL 11    gabapentin (NEURONTIN) 300 MG capsule TAKE UP TO 6 CAPSULES BY MOUTH EVERY  capsule 11    guaiFENesin (MUCINEX) 600 mg 12 hr tablet Take 2 tablets (1,200 mg  total) by mouth 2 (two) times daily as needed (cough). 40 tablet 0    ibuprofen (ADVIL,MOTRIN) 600 MG tablet Take 1 tablet (600 mg total) by mouth every 8 (eight) hours as needed for Pain. 20 tablet 0    methylPREDNISolone (MEDROL DOSEPACK) 4 mg tablet use as directed 1 each 0    mometasone (NASONEX) 50 mcg/actuation nasal spray 1-2 sprays by Nasal route once daily. 17 g 11    nebulizers Misc Use as instructed daily prn with nebulizer medication 1 each 0    pantoprazole (PROTONIX) 40 MG tablet Take 1 tablet (40 mg total) by mouth once daily. 90 tablet 3    terconazole (TERAZOL 7) 0.4 % Crea Place 1 applicator vaginally every evening. 45 g 0     No current facility-administered medications on file prior to visit.       Social History:   Social History     Socioeconomic History    Marital status: Single    Number of children: 1   Occupational History    Occupation: Works Security     Employer: [employer]   Tobacco Use    Smoking status: Never     Passive exposure: Never    Smokeless tobacco: Never   Substance and Sexual Activity    Alcohol use: Yes     Comment: rare    Drug use: Never    Sexual activity: Yes     Partners: Male   Social History Narrative    The patient does exercise regularly ([employer]).      She is not satisfied with weight.    Rates diet as good.    She does drink at least 1/2 gallon water daily.    She drinks 0 coffee/tea/caffeine-containing soft drinks daily.    Total sleep time at night is 8 hours.    She works 40 hours per week.    She does wear seat belts.    Hobbies include none.       REVIEW OF SYSTEMS:  Constitution: Negative. Negative for chills, fever and night sweats.   Cardiovascular: Negative for chest pain and syncope.   Respiratory: Negative for cough and shortness of breath.   Gastrointestinal: See HPI. Negative for nausea/vomiting. Negative for abdominal pain.  Genitourinary: See HPI. Negative for discoloration or dysuria.  Skin: Negative for dry skin, itching and  rash.   Hematologic/Lymphatic: Negative for bleeding problem. Does not bruise/bleed easily.   Musculoskeletal: Negative for falls and muscle weakness.   Neurological: See HPI. No seizures.   Endocrine: Negative for polydipsia, polyphagia and polyuria.   Allergic/Immunologic: Negative for hives and persistent infections.     EXAM:  LMP 01/01/2000 (Within Months)     General: The patient is a very pleasant 63 y.o. female in no apparent distress, the patient is oriented to person, place and time.  Psych: Normal mood and affect  HEENT: Vision grossly intact, hearing intact to the spoken word.  Lungs: Respirations unlabored.  Gait: Normal station and gait, no difficulty with toe or heel walk.   Skin: Dorsal lumbar skin negative for rashes, lesions, hairy patches and surgical scars. There is negative lumbar tenderness to palpation.  Range of motion: Lumbar range of motion is acceptable.  Spinal Balance: Global saggital and coronal spinal balance acceptable, not significant for scoliosis and kyphosis.  Musculoskeletal: No pain with the range of motion of the bilateral hips. No trochanteric tenderness to palpation.  Vascular: Bilateral lower extremities warm and well perfused, dorsalis pedis pulses 2+ bilaterally.  Neurological: Normal strength and tone in all major motor groups in the bilateral lower extremities. Normal sensation to light touch in the L2-S1 dermatomes bilaterally.  Deep tendon reflexes symmetric 2+ in the bilateral lower extremities.  Negative Babinski bilaterally. Straight leg raise negative bilaterally.    IMAGING:      Today I personally reviewed AP, Lat and Flex/Ex  upright L-spine films that demonstrate mild degenerative changes      There is no height or weight on file to calculate BMI.    Hemoglobin A1C   Date Value Ref Range Status   07/24/2023 5.5 <5.7 % of total Hgb Final     Comment:     For the purpose of screening for the presence of  diabetes:     <5.7%       Consistent with the absence of  diabetes  5.7-6.4%    Consistent with increased risk for diabetes              (prediabetes)  > or =6.5%  Consistent with diabetes     This assay result is consistent with a decreased risk  of diabetes.     Currently, no consensus exists regarding use of  hemoglobin A1c for diagnosis of diabetes in children.     According to American Diabetes Association (ADA)  guidelines, hemoglobin A1c <7.0% represents optimal  control in non-pregnant diabetic patients. Different  metrics may apply to specific patient populations.   Standards of Medical Care in Diabetes(ADA).         06/11/2022 5.4 <5.7 % of total Hgb Final     Comment:     For the purpose of screening for the presence of  diabetes:     <5.7%       Consistent with the absence of diabetes  5.7-6.4%    Consistent with increased risk for diabetes              (prediabetes)  > or =6.5%  Consistent with diabetes     This assay result is consistent with a decreased risk  of diabetes.     Currently, no consensus exists regarding use of  hemoglobin A1c for diagnosis of diabetes in children.     According to American Diabetes Association (ADA)  guidelines, hemoglobin A1c <7.0% represents optimal  control in non-pregnant diabetic patients. Different  metrics may apply to specific patient populations.   Standards of Medical Care in Diabetes(ADA).         05/29/2021 5.6 <5.7 % of total Hgb Final     Comment:     For the purpose of screening for the presence of  diabetes:     <5.7%       Consistent with the absence of diabetes  5.7-6.4%    Consistent with increased risk for diabetes              (prediabetes)  > or =6.5%  Consistent with diabetes     This assay result is consistent with a decreased risk  of diabetes.     Currently, no consensus exists regarding use of  hemoglobin A1c for diagnosis of diabetes in children.     According to American Diabetes Association (ADA)  guidelines, hemoglobin A1c <7.0% represents optimal  control in non-pregnant diabetic patients.  Different  metrics may apply to specific patient populations.   Standards of Medical Care in Diabetes(ADA).                 ASSESSMENT/PLAN:    There are no diagnoses linked to this encounter.    Today we discussed at length all of the different treatment options including anti-inflammatories, acetaminophen, rest, ice, heat, physical therapy including strengthening and stretching exercises, home exercises, ROM, aerobic conditioning, aqua therapy, other modalities including ultrasound, massage, and dry needling, epidural steroid injections and finally surgical intervention.      Pt presents with chronic left lumbar radiculopathy. Failure of conservative rx. Will obtain MRI to further evaluate and call with results.

## 2024-05-23 ENCOUNTER — OFFICE VISIT (OUTPATIENT)
Dept: SPINE | Facility: CLINIC | Age: 64
End: 2024-05-23
Payer: COMMERCIAL

## 2024-05-23 VITALS — WEIGHT: 144 LBS | HEIGHT: 60 IN | BODY MASS INDEX: 28.27 KG/M2

## 2024-05-23 DIAGNOSIS — M54.16 LUMBAR RADICULOPATHY, CHRONIC: Primary | ICD-10-CM

## 2024-05-23 PROCEDURE — 99214 OFFICE O/P EST MOD 30 MIN: CPT | Mod: S$GLB,,, | Performed by: ORTHOPAEDIC SURGERY

## 2024-05-23 PROCEDURE — 1159F MED LIST DOCD IN RCRD: CPT | Mod: CPTII,S$GLB,, | Performed by: ORTHOPAEDIC SURGERY

## 2024-05-23 PROCEDURE — 99999 PR PBB SHADOW E&M-EST. PATIENT-LVL III: CPT | Mod: PBBFAC,,, | Performed by: ORTHOPAEDIC SURGERY

## 2024-05-23 PROCEDURE — 3008F BODY MASS INDEX DOCD: CPT | Mod: CPTII,S$GLB,, | Performed by: ORTHOPAEDIC SURGERY

## 2024-07-08 ENCOUNTER — HOSPITAL ENCOUNTER (OUTPATIENT)
Dept: RADIOLOGY | Facility: HOSPITAL | Age: 64
Discharge: HOME | End: 2024-07-08
Attending: ORTHOPAEDIC SURGERY
Payer: COMMERCIAL

## 2024-07-08 DIAGNOSIS — M54.16 LUMBAR RADICULOPATHY, CHRONIC: ICD-10-CM

## 2024-07-08 PROCEDURE — 72148 MRI LUMBAR SPINE W/O DYE: CPT | Mod: 26,,, | Performed by: RADIOLOGY

## 2024-07-08 PROCEDURE — 72148 MRI LUMBAR SPINE W/O DYE: CPT | Mod: TC

## 2024-07-16 ENCOUNTER — PATIENT MESSAGE (OUTPATIENT)
Dept: SPINE | Facility: CLINIC | Age: 64
End: 2024-07-16
Payer: COMMERCIAL

## 2024-07-16 DIAGNOSIS — M54.42 ACUTE LEFT-SIDED BACK PAIN WITH SCIATICA: Primary | ICD-10-CM

## 2024-07-16 NOTE — PROGRESS NOTES
Spoke with pt virtually. Pt was last seen 5/23/24 and continues to have left leg pain. Pt has tried home exercises and a medrol dose pack and gabapentin with no relief. Pain is 8/10. I provided the patient with a home exercise program. It is the [facility] spine conditioning program. Exercises include head rolls, kneeling back extension, sitting rotation stretch, modified seated side straddle, knee to chest, bird dog, plank, modified seated plank, hip bridges, abdominal bracing, and abdominal crunch. Pt completes each exercise daily for one hour with worsening of pain. MRI demonstrates disc bulging from L4-S1. Will order left L4-5 L5-S1 with pain management. Pt will fu if pain persists.

## 2024-07-17 ENCOUNTER — PATIENT MESSAGE (OUTPATIENT)
Dept: PAIN MEDICINE | Facility: OTHER | Age: 64
End: 2024-07-17
Payer: COMMERCIAL

## 2024-07-22 DIAGNOSIS — J30.9 ALLERGIC RHINITIS, UNSPECIFIED SEASONALITY, UNSPECIFIED TRIGGER: ICD-10-CM

## 2024-07-22 RX ORDER — CETIRIZINE HYDROCHLORIDE 10 MG/1
10 TABLET ORAL
Qty: 90 TABLET | Refills: 0 | Status: SHIPPED | OUTPATIENT
Start: 2024-07-22

## 2024-07-22 NOTE — TELEPHONE ENCOUNTER
Refill Decision Note   Val Alexandru  is requesting a refill authorization.  Brief Assessment and Rationale for Refill:  Approve     Medication Therapy Plan:         Comments:     Note composed:5:51 PM 07/22/2024

## 2024-07-22 NOTE — TELEPHONE ENCOUNTER
No care due was identified.  Health [facility] Embedded Care Due Messages. Reference number: [id number].   7/22/2024 9:48:22 AM CDT

## 2024-07-24 ENCOUNTER — TELEPHONE (OUTPATIENT)
Dept: PAIN MEDICINE | Facility: CLINIC | Age: 64
End: 2024-07-24
Payer: COMMERCIAL

## 2024-07-24 ENCOUNTER — PATIENT MESSAGE (OUTPATIENT)
Dept: ADMINISTRATIVE | Facility: OTHER | Age: 64
End: 2024-07-24
Payer: COMMERCIAL

## 2024-07-24 ENCOUNTER — PATIENT MESSAGE (OUTPATIENT)
Dept: PAIN MEDICINE | Facility: OTHER | Age: 64
End: 2024-07-24
Payer: COMMERCIAL

## 2024-07-24 NOTE — TELEPHONE ENCOUNTER
----- Message from Yong Zamorano sent at 7/24/2024  9:03 AM CDT -----  Name of Who is Calling:YESSICA LOZA [[id number]]                   What is the request in detail:pt wants a call back to know what time to come in for her appt                    Can the clinic reply by [facility]: No                   What Number to Call Back if not in [facility]:[phone]

## 2024-07-25 ENCOUNTER — OFFICE VISIT (OUTPATIENT)
Dept: INTERNAL MEDICINE | Facility: CLINIC | Age: 64
End: 2024-07-25
Payer: COMMERCIAL

## 2024-07-25 VITALS
TEMPERATURE: 98 F | DIASTOLIC BLOOD PRESSURE: 64 MMHG | OXYGEN SATURATION: 97 % | RESPIRATION RATE: 16 BRPM | SYSTOLIC BLOOD PRESSURE: 112 MMHG | HEIGHT: 60 IN | HEART RATE: 84 BPM | WEIGHT: 154.31 LBS | BODY MASS INDEX: 30.29 KG/M2

## 2024-07-25 DIAGNOSIS — J30.2 CHRONIC SEASONAL ALLERGIC RHINITIS: ICD-10-CM

## 2024-07-25 DIAGNOSIS — Z00.00 ANNUAL PHYSICAL EXAM: Primary | ICD-10-CM

## 2024-07-25 DIAGNOSIS — D32.9 MENINGIOMA: ICD-10-CM

## 2024-07-25 DIAGNOSIS — K58.0 IRRITABLE BOWEL SYNDROME WITH DIARRHEA: ICD-10-CM

## 2024-07-25 DIAGNOSIS — J30.9 ALLERGIC RHINITIS, UNSPECIFIED SEASONALITY, UNSPECIFIED TRIGGER: ICD-10-CM

## 2024-07-25 DIAGNOSIS — F41.0 GENERALIZED ANXIETY DISORDER WITH PANIC ATTACKS: ICD-10-CM

## 2024-07-25 DIAGNOSIS — I83.813 VARICOSE VEINS OF BILATERAL LOWER EXTREMITIES WITH PAIN: ICD-10-CM

## 2024-07-25 DIAGNOSIS — F41.1 GENERALIZED ANXIETY DISORDER WITH PANIC ATTACKS: ICD-10-CM

## 2024-07-25 PROCEDURE — 99396 PREV VISIT EST AGE 40-64: CPT | Mod: S$GLB,,, | Performed by: HOSPITALIST

## 2024-07-25 PROCEDURE — 3074F SYST BP LT 130 MM HG: CPT | Mod: CPTII,S$GLB,, | Performed by: HOSPITALIST

## 2024-07-25 PROCEDURE — 99999 PR PBB SHADOW E&M-EST. PATIENT-LVL III: CPT | Mod: PBBFAC,,, | Performed by: HOSPITALIST

## 2024-07-25 PROCEDURE — 3008F BODY MASS INDEX DOCD: CPT | Mod: CPTII,S$GLB,, | Performed by: HOSPITALIST

## 2024-07-25 PROCEDURE — 1160F RVW MEDS BY RX/DR IN RCRD: CPT | Mod: CPTII,S$GLB,, | Performed by: HOSPITALIST

## 2024-07-25 PROCEDURE — 1159F MED LIST DOCD IN RCRD: CPT | Mod: CPTII,S$GLB,, | Performed by: HOSPITALIST

## 2024-07-25 PROCEDURE — 3078F DIAST BP <80 MM HG: CPT | Mod: CPTII,S$GLB,, | Performed by: HOSPITALIST

## 2024-07-25 RX ORDER — CYCLOBENZAPRINE HCL 5 MG
5 TABLET ORAL NIGHTLY PRN
Qty: 30 TABLET | Refills: 5 | Status: SHIPPED | OUTPATIENT
Start: 2024-07-25

## 2024-07-25 RX ORDER — ALPRAZOLAM 0.25 MG/1
0.25 TABLET ORAL 2 TIMES DAILY PRN
Qty: 180 TABLET | Refills: 1 | Status: SHIPPED | OUTPATIENT
Start: 2024-07-25

## 2024-07-25 NOTE — PROGRESS NOTES
Subjective:     @Patient ID: Val Horvath is a 63 y.o. female.    Chief Complaint: Annual Exam    HPI    62 yo F with anxiety, seasonal alleries, IBS, GERD, family hx of breast ca, hiatal hernia, diverticulosis, meningioma   Presents for annual exam:         Lipid disorders/ASCVD risk (ages >/= 45 or >/= 20 if increased risk ): ordered  DM (>45y yearly or if obese, HTN): A1c ordered  Breast Cancer (40-50y discretion of pt, 50-74y every 1-2 years): due in August   Cervical Cancer (Pap Smear ages 21-65 every 3 years or Pap + HPV q5 years after 30 years of age): reports done with  Dr. Darrell Balderrama    Colorectal Cancer (normal risk 50-75yr): Colonoscopy  Follows with GI Dr Davenport. Done 12/2020         Vaccines:   Influenza (yearly): n/a  Tetanus (every 10 yrs - 1st tdap) done 1/2018  PPSV23(>64yo or <65 w/ lung dz, smoking, DM): n/a   Zoster (>61yo) utd   Covid19: due for booster         Exercise: biking, walking   Diet: regular      Review of Systems   Constitutional:  Negative for chills and fever.   HENT:  Negative for congestion and sore throat.    Eyes:  Negative for pain and visual disturbance.   Respiratory:  Negative for cough and shortness of breath.    Cardiovascular:  Negative for chest pain.   Gastrointestinal:  Negative for abdominal pain, nausea and vomiting.   Endocrine: Negative for polydipsia and polyuria.   Genitourinary:  Negative for difficulty urinating and dysuria.   Musculoskeletal:  Positive for back pain. Negative for arthralgias.   Skin:  Negative for rash and wound.   Neurological:  Negative for dizziness, weakness and headaches.   Psychiatric/Behavioral:  Negative for agitation and confusion.      Past medical history, surgical history, and family medical history reviewed and updated as appropriate.    Medications and allergies reviewed.     Objective:     Vitals:    07/25/24 1528   BP: 112/64   BP Location: Right arm   Patient Position: Sitting   BP Method: Medium (Manual)   Pulse: 84    Resp: 16   Temp: 97.9 °F (36.6 °C)   TempSrc: Temporal   SpO2: 97%   Weight: 70 kg (154 lb 5.2 oz)   Height: 5' (1.524 m)     Body mass index is 30.14 kg/m².  Physical Exam  Vitals reviewed.   Constitutional:       General: She is not in acute distress.     Appearance: She is well-developed.   HENT:      Head: Normocephalic and atraumatic.      Right Ear: Tympanic membrane normal.      Left Ear: Tympanic membrane normal.      Mouth/Throat:      Mouth: Mucous membranes are moist.      Pharynx: No oropharyngeal exudate.   Eyes:      General:         Right eye: No discharge.         Left eye: No discharge.      Conjunctiva/sclera: Conjunctivae normal.   Cardiovascular:      Rate and Rhythm: Normal rate and regular rhythm.      Heart sounds: No murmur heard.     No friction rub.   Pulmonary:      Effort: Pulmonary effort is normal.      Breath sounds: Normal breath sounds.   Abdominal:      General: Bowel sounds are normal. There is no distension.      Palpations: Abdomen is soft.      Tenderness: There is no abdominal tenderness. There is no guarding.   Musculoskeletal:         General: Normal range of motion.      Cervical back: Normal range of motion and neck supple.      Comments: Trace BLE edema   Lymphadenopathy:      Cervical: No cervical adenopathy.   Skin:     General: Skin is warm and dry.   Neurological:      Mental Status: She is alert and oriented to person, place, and time.   Psychiatric:         Mood and Affect: Mood normal.         Behavior: Behavior normal.         Lab Results   Component Value Date    WBC 6.5 07/24/2023    HGB 12.4 07/24/2023    HCT 37.2 07/24/2023     07/24/2023    CHOL 197 07/24/2023    TRIG 94 07/24/2023    HDL 72 07/24/2023    ALT 12 07/24/2023    AST 16 07/24/2023     07/24/2023    K 4.5 07/24/2023     07/24/2023    CREATININE 0.63 07/24/2023    BUN 14 07/24/2023    CO2 28 07/24/2023    TSH 1.83 07/24/2023    HGBA1C 5.5 07/24/2023       Assessment:     1.  Annual physical exam    2. Generalized anxiety disorder with panic attacks    3. Chronic seasonal allergic rhinitis    4. Irritable bowel syndrome with diarrhea    5. Meningioma    6. Allergic rhinitis, unspecified seasonality, unspecified trigger    7. Varicose veins of bilateral lower extremities with pain      Plan:   Val was seen today for annual exam.    Diagnoses and all orders for this visit:    Annual physical exam  -     Comprehensive Metabolic Panel; Future  -     CBC Auto Differential; Future  -     Lipid Panel; Future  -     TSH; Future  -     Urinalysis; Future  -     Hemoglobin A1C; Future  -     Comprehensive Metabolic Panel  -     CBC Auto Differential  -     Lipid Panel  -     TSH  -     Urinalysis  -     Hemoglobin A1C    Generalized anxiety disorder with panic attacks  -     Comprehensive Metabolic Panel; Future  -     CBC Auto Differential; Future  -     Lipid Panel; Future  -     TSH; Future  -     Urinalysis; Future  -     Hemoglobin A1C; Future  -     Comprehensive Metabolic Panel  -     CBC Auto Differential  -     Lipid Panel  -     TSH  -     Urinalysis  -     Hemoglobin A1C    Chronic seasonal allergic rhinitis  -     Comprehensive Metabolic Panel; Future  -     CBC Auto Differential; Future  -     Lipid Panel; Future  -     TSH; Future  -     Urinalysis; Future  -     Hemoglobin A1C; Future  -     Comprehensive Metabolic Panel  -     CBC Auto Differential  -     Lipid Panel  -     TSH  -     Urinalysis  -     Hemoglobin A1C    Irritable bowel syndrome with diarrhea  -     Comprehensive Metabolic Panel; Future  -     CBC Auto Differential; Future  -     Lipid Panel; Future  -     TSH; Future  -     Urinalysis; Future  -     Hemoglobin A1C; Future  -     Comprehensive Metabolic Panel  -     CBC Auto Differential  -     Lipid Panel  -     TSH  -     Urinalysis  -     Hemoglobin A1C    Meningioma  -     Comprehensive Metabolic Panel; Future  -     CBC Auto Differential; Future  -     Lipid  Panel; Future  -     TSH; Future  -     Urinalysis; Future  -     Hemoglobin A1C; Future  -     Comprehensive Metabolic Panel  -     CBC Auto Differential  -     Lipid Panel  -     TSH  -     Urinalysis  -     Hemoglobin A1C    Allergic rhinitis, unspecified seasonality, unspecified trigger    Varicose veins of bilateral lower extremities with pain  -     Cancel: COMPRESSION STOCKINGS  -     COMPRESSION STOCKINGS    Other orders  -     ALPRAZolam (XANAX) 0.25 MG tablet; Take 1 tablet (0.25 mg total) by mouth 2 (two) times daily as needed for Anxiety.        Rtc 6 months     India Camacho MD  Internal Medicine    7/25/2024

## 2024-07-29 ENCOUNTER — PATIENT MESSAGE (OUTPATIENT)
Dept: PAIN MEDICINE | Facility: OTHER | Age: 64
End: 2024-07-29
Payer: COMMERCIAL

## 2024-07-29 PROBLEM — R73.03 PREDIABETES: Status: ACTIVE | Noted: 2024-07-29

## 2024-07-31 ENCOUNTER — HOSPITAL ENCOUNTER (OUTPATIENT)
Facility: OTHER | Age: 64
Discharge: HOME | End: 2024-07-31
Attending: ANESTHESIOLOGY | Admitting: ANESTHESIOLOGY
Payer: COMMERCIAL

## 2024-07-31 VITALS
TEMPERATURE: 98 F | HEIGHT: 60 IN | RESPIRATION RATE: 16 BRPM | BODY MASS INDEX: 28.47 KG/M2 | OXYGEN SATURATION: 99 % | HEART RATE: 70 BPM | DIASTOLIC BLOOD PRESSURE: 79 MMHG | SYSTOLIC BLOOD PRESSURE: 123 MMHG | WEIGHT: 145 LBS

## 2024-07-31 DIAGNOSIS — G89.29 CHRONIC PAIN: ICD-10-CM

## 2024-07-31 DIAGNOSIS — M54.16 LUMBAR RADICULOPATHY: Primary | ICD-10-CM

## 2024-07-31 PROCEDURE — 64483 NJX AA&/STRD TFRM EPI L/S 1: CPT | Mod: LT,,, | Performed by: ANESTHESIOLOGY

## 2024-07-31 PROCEDURE — 25000003 PHARM REV CODE 250: Performed by: ANESTHESIOLOGY

## 2024-07-31 PROCEDURE — 64484 NJX AA&/STRD TFRM EPI L/S EA: CPT | Mod: LT,,, | Performed by: ANESTHESIOLOGY

## 2024-07-31 PROCEDURE — 64483 NJX AA&/STRD TFRM EPI L/S 1: CPT | Mod: LT | Performed by: ANESTHESIOLOGY

## 2024-07-31 PROCEDURE — 64484 NJX AA&/STRD TFRM EPI L/S EA: CPT | Mod: LT | Performed by: ANESTHESIOLOGY

## 2024-07-31 PROCEDURE — 63600175 PHARM REV CODE 636 W HCPCS: Performed by: ANESTHESIOLOGY

## 2024-07-31 PROCEDURE — 99152 MOD SED SAME PHYS/QHP 5/>YRS: CPT | Mod: ,,, | Performed by: ANESTHESIOLOGY

## 2024-07-31 PROCEDURE — 25500020 PHARM REV CODE 255: Performed by: ANESTHESIOLOGY

## 2024-07-31 RX ORDER — FENTANYL CITRATE 50 UG/ML
INJECTION, SOLUTION INTRAMUSCULAR; INTRAVENOUS
Status: DISCONTINUED | OUTPATIENT
Start: 2024-07-31 | End: 2024-07-31 | Stop reason: HOSPADM

## 2024-07-31 RX ORDER — DEXAMETHASONE SODIUM PHOSPHATE 10 MG/ML
INJECTION INTRAMUSCULAR; INTRAVENOUS
Status: DISCONTINUED | OUTPATIENT
Start: 2024-07-31 | End: 2024-07-31 | Stop reason: HOSPADM

## 2024-07-31 RX ORDER — MIDAZOLAM HYDROCHLORIDE 1 MG/ML
INJECTION INTRAMUSCULAR; INTRAVENOUS
Status: DISCONTINUED | OUTPATIENT
Start: 2024-07-31 | End: 2024-07-31 | Stop reason: HOSPADM

## 2024-07-31 RX ORDER — LIDOCAINE HYDROCHLORIDE 10 MG/ML
INJECTION, SOLUTION EPIDURAL; INFILTRATION; INTRACAUDAL; PERINEURAL
Status: DISCONTINUED | OUTPATIENT
Start: 2024-07-31 | End: 2024-07-31 | Stop reason: HOSPADM

## 2024-07-31 RX ORDER — LIDOCAINE HYDROCHLORIDE 20 MG/ML
INJECTION, SOLUTION INFILTRATION; PERINEURAL
Status: DISCONTINUED | OUTPATIENT
Start: 2024-07-31 | End: 2024-07-31 | Stop reason: HOSPADM

## 2024-07-31 RX ORDER — SODIUM CHLORIDE 9 MG/ML
INJECTION, SOLUTION INTRAVENOUS CONTINUOUS
Status: DISCONTINUED | OUTPATIENT
Start: 2024-07-31 | End: 2024-07-31 | Stop reason: HOSPADM

## 2024-08-13 RX ORDER — ACYCLOVIR 400 MG/1
400 TABLET ORAL 2 TIMES DAILY
Qty: 180 TABLET | Refills: 3 | Status: SHIPPED | OUTPATIENT
Start: 2024-08-13

## 2024-08-13 NOTE — TELEPHONE ENCOUNTER
Refill Decision Note   Val Alexandru  is requesting a refill authorization.  Brief Assessment and Rationale for Refill:  Approve     Medication Therapy Plan:         Pharmacist review requested: Yes   Extended chart review required: Yes   Comments:     Note composed:5:38 PM 08/13/2024

## 2024-08-13 NOTE — TELEPHONE ENCOUNTER
No care due was identified.  [facility] Embedded Care Due Messages. Reference number: [id number].   8/13/2024 5:31:10 AM CDT

## 2024-08-13 NOTE — TELEPHONE ENCOUNTER
Refill Routing Note   Medication(s) are not appropriate for processing by [facility] for the following reason(s):        Other    [facility] action(s):  Defer        Medication Therapy Plan: EPIC OVERRIDE NEEDED BY PROVIDER Dose rounding warning or dose outside configured limits - Required    Pharmacist review requested: Yes     Appointments  past 12m or future 3m with PCP    Date Provider   Last Visit   7/25/2024 India Camacho MD   Next Visit   1/24/2025 India Camacho MD   ED visits in past 90 days: 0        Note composed:11:34 AM 08/13/2024

## 2024-08-27 ENCOUNTER — HOSPITAL ENCOUNTER (OUTPATIENT)
Dept: RADIOLOGY | Facility: OTHER | Age: 64
Discharge: HOME | End: 2024-08-27
Attending: HOSPITALIST
Payer: COMMERCIAL

## 2024-08-27 DIAGNOSIS — Z12.31 ENCOUNTER FOR SCREENING MAMMOGRAM FOR BREAST CANCER: ICD-10-CM

## 2024-08-27 DIAGNOSIS — J30.9 ALLERGIC RHINITIS, UNSPECIFIED SEASONALITY, UNSPECIFIED TRIGGER: ICD-10-CM

## 2024-08-27 PROCEDURE — 77063 BREAST TOMOSYNTHESIS BI: CPT | Mod: TC

## 2024-08-27 RX ORDER — CETIRIZINE HYDROCHLORIDE 10 MG/1
10 TABLET ORAL
Qty: 90 TABLET | Refills: 3 | OUTPATIENT
Start: 2024-08-27

## 2024-08-27 NOTE — TELEPHONE ENCOUNTER
----- Message from Noelle Duran sent at 8/27/2024  9:27 AM CDT -----  Contact: [phone]  Requesting an RX refill or new RX.    Is this a refill or new RX: refill     RX name and strength (copy/paste from chart):  cetirizine (ZYRTEC) 10 MG tablet    Is this a 30 day or 90 day RX:     Pharmacy name and phone # (copy/paste from chart):    [id number] [address] [address]  Phone: [phone] Fax: [phone]        The doctors have asked that we provide their patients with the following 2 reminders -- prescription refills can take up to 72 hours, and a friendly reminder that in the future you can use your [facility] account to request refills: call back     Pt never did get her meds

## 2024-08-27 NOTE — TELEPHONE ENCOUNTER
The patient informed that her Rx Zyrtec has been refilled on 8/24.  The patient verbalized understanding.

## 2024-09-07 DIAGNOSIS — J30.9 ALLERGIC RHINITIS, UNSPECIFIED SEASONALITY, UNSPECIFIED TRIGGER: ICD-10-CM

## 2024-09-07 NOTE — TELEPHONE ENCOUNTER
No care due was identified.  [facility] Embedded Care Due Messages. Reference number: [id number].   9/07/2024 5:44:02 PM CDT

## 2024-09-08 RX ORDER — CETIRIZINE HYDROCHLORIDE 10 MG/1
10 TABLET ORAL
Qty: 90 TABLET | Refills: 3 | Status: SHIPPED | OUTPATIENT
Start: 2024-09-08

## 2024-09-08 NOTE — TELEPHONE ENCOUNTER
Refill Decision Note   Val Horvath  is requesting a refill authorization.  Brief Assessment and Rationale for Refill:  Approve     Medication Therapy Plan:         Comments:     Note composed:3:53 PM 09/08/2024             Appointments     Last Visit   7/25/2024 India Camacho MD   Next Visit   1/24/2025 India Camacho MD

## 2024-09-13 ENCOUNTER — TELEPHONE (OUTPATIENT)
Dept: OBSTETRICS AND GYNECOLOGY | Facility: CLINIC | Age: 64
End: 2024-09-13
Payer: COMMERCIAL

## 2024-09-19 ENCOUNTER — TELEPHONE (OUTPATIENT)
Dept: PAIN MEDICINE | Facility: CLINIC | Age: 64
End: 2024-09-19

## 2024-09-19 ENCOUNTER — OFFICE VISIT (OUTPATIENT)
Dept: PAIN MEDICINE | Facility: CLINIC | Age: 64
End: 2024-09-19
Attending: ANESTHESIOLOGY
Payer: COMMERCIAL

## 2024-09-19 VITALS
BODY MASS INDEX: 28.33 KG/M2 | HEART RATE: 86 BPM | TEMPERATURE: 98 F | WEIGHT: 145.06 LBS | OXYGEN SATURATION: 100 % | SYSTOLIC BLOOD PRESSURE: 117 MMHG | RESPIRATION RATE: 18 BRPM | DIASTOLIC BLOOD PRESSURE: 65 MMHG
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
## 2024-09-19 DIAGNOSIS — M54.16 LUMBAR RADICULOPATHY: Primary | ICD-10-CM

## 2024-09-19 PROCEDURE — 3074F SYST BP LT 130 MM HG: CPT | Mod: CPTII,S$GLB,, | Performed by: ANESTHESIOLOGY

## 2024-09-19 PROCEDURE — 3078F DIAST BP <80 MM HG: CPT | Mod: CPTII,S$GLB,, | Performed by: ANESTHESIOLOGY

## 2024-09-19 PROCEDURE — 1160F RVW MEDS BY RX/DR IN RCRD: CPT | Mod: CPTII,S$GLB,, | Performed by: ANESTHESIOLOGY

## 2024-09-19 PROCEDURE — 99999 PR PBB SHADOW E&M-EST. PATIENT-LVL V: CPT | Mod: PBBFAC,,, | Performed by: ANESTHESIOLOGY

## 2024-09-19 PROCEDURE — 3044F HG A1C LEVEL LT 7.0%: CPT | Mod: CPTII,S$GLB,, | Performed by: ANESTHESIOLOGY

## 2024-09-19 PROCEDURE — 99204 OFFICE O/P NEW MOD 45 MIN: CPT | Mod: S$GLB,,, | Performed by: ANESTHESIOLOGY

## 2024-09-19 PROCEDURE — 3008F BODY MASS INDEX DOCD: CPT | Mod: CPTII,S$GLB,, | Performed by: ANESTHESIOLOGY

## 2024-09-19 PROCEDURE — 1159F MED LIST DOCD IN RCRD: CPT | Mod: CPTII,S$GLB,, | Performed by: ANESTHESIOLOGY

## 2024-09-19 NOTE — PROGRESS NOTES
Chronic Pain - New Consult    PCP: India Camacho MD    REFERRING PHYSICIAN: No ref. provider found    CHIEF COMPLAINT: lower back pain     Original HISTORY OF PRESENT ILLNESS: Val Horvath presents to the clinic for the evaluation of the above pain. . Patient was seen by Orthopedic PA Ade Gutierres and was sent to procedure clinic for Left L4/5 and L5/S1 TFESI which she underwent 7/31/24. She had 80% relief of her lower back pain, but this did not relieve her leg pain. Patient reports the back pain is well controlled now. She is most concerned about shooting pain down L leg to the L knee (does not extend to the foot).     Original Pain Description:  The pain is located in the R anterior thigh and the L buttock radiating down the left lateral thigh. The right leg pain started after a fall at work in July (no litigation involved with work). The pain is described as aching and throbbing. Exacerbating factors: wearing tight clothes and going up stairs. Mitigating factors laying down and rest. Symptoms interfere with daily activity, sleeping, and work. The patient feels like symptoms have been unchanged. Patient denies night fever/night sweats, urinary incontinence, bowel incontinence, significant weight loss, significant motor weakness, and loss of sensations.    Original PAIN SCORES:  Best: Pain is 5  Worst: Pain is 9  Current: Pain is 6        9/19/2024     3:47 PM   Last 3 PDI Scores   Pain Disability Index (PDI) 42           Conservative therapy:  PT: not currently participating   Chiro:  HEP in the past 6 months: patient preforming daily HEP with limited benefit       Treatments / Medications: (Ice/Heat/NSAIDS/APAP/etc):  Tylenol  Ice   Unable to take NSAIDs 2/2 gastric issues       Interventional Pain Procedures: (Previous injections)  7/31/24 - Left L4/5 and L5/S1 TFESI            IMAGING:    MRI LUMBAR SPINE WITHOUT CONTRAST 7/8/24     CLINICAL HISTORY:  Lumbar radiculopathy, symptoms persist  with conservative treatment; Radiculopathy, lumbar region     TECHNIQUE:  Multiplanar, multisequence MR images were acquired from the thoracolumbar junction to the sacrum without contrast.     COMPARISON:  XR L-spine 04/24/2024.     FINDINGS:  Alignment: Normal sagittal alignment.  No spondylolisthesis.     Vertebrae: Vertebral body heights are maintained.  No fracture or marrow infiltrative process.     Discs: Degenerative disc desiccation with mild height loss at L3-L4 and L4-L5.  Trace bone marrow edema/Modic 1 changes along the posterior aspect of the L4 superior endplate.     Cord: Conus terminates at L2 and appears unremarkable.  Cauda equina appears unremarkable.     Degenerative findings:     T12-L1: No spinal canal stenosis or neural foraminal narrowing.     L1-L2: No spinal canal stenosis or neural foraminal narrowing.     L2-L3: No spinal canal stenosis or neural foraminal narrowing.     L3-L4: Circumferential disc bulge causes mass effect on the ventral thecal sac and lateral recesses and likely abuts the bilateral descending L4 nerve root.  Bilateral ligamentum flavum hypertrophy.  Mild spinal canal stenosis.  Mild-to-moderate left neural foraminal narrowing.     L4-L5: Circumferential disc bulge causes mass effect on the ventral thecal sac and lateral recesses and closely approximates the bilateral descending L5 nerve roots.  Right extraforaminal broad-based protrusion closely approximates the right exiting L4 nerve root.  No spinal canal stenosis.  Mild right neural foraminal narrowing.     L5-S1: Posterior central/left subarticular broad-based disc bulge closely approximates the left descending S1 nerve root.  Bilateral facet arthropathy with mild right-sided periarticular soft tissue edema.  No spinal canal stenosis.  Mild left neural foraminal narrowing.     Paraspinal muscles & soft tissues: Paraspinal muscles are unremarkable.  Partially visualized T2 hyperintense lesion within the right hepatic  lobe, possibly a small cyst or benign hemangioma.  Few small bilateral simple renal cysts.     Impression:     Multilevel lumbar spondylosis as detailed above.  Mild spinal canal stenosis at L3-L4.  Multilevel mild-to-moderate neural foraminal narrowing.      Past Medical History:   Diagnosis Date    Anxiety     AR (allergic rhinitis) 3/10/2014    Diverticulosis 2010    Family history of breast cancer in sister     Genital herpes     Hiatal hernia 2010    Irritable bowel syndrome     Meningioma 3/10/2014    MRI 2/14    Post herpetic neuralgia 1/21/2014 1/14      Past Surgical History:   Procedure Laterality Date    CARPAL TUNNEL RELEASE Right 1990s    COLONOSCOPY N/A 12/2/2020    Procedure: COLONOSCOPY;  Surgeon: Pieter Gomez MD;  Location: [facility] (4TH FLR);  Service: Endoscopy;  Laterality: N/A;  patient due for colonoscopy in November 2020  prep ins. emailed  and mailed - COVID screening on 11/29/20 [facility]  patient confirmed new arrival time of 0900-BB    ESOPHAGOGASTRODUODENOSCOPY N/A 12/1/2023    Procedure: EGD (ESOPHAGOGASTRODUODENOSCOPY);  Surgeon: Pieter Gomez MD;  Location: [facility] (4TH FLR);  Service: Endoscopy;  Laterality: N/A;  time frame 5-12 weeks  dr. gomez patient  [facility] -  pt is hypoglycemic  11/24 precall complete-ml    ESOPHAGOGASTRODUODENOSCOPY N/A 2/26/2024    Procedure: EGD (ESOPHAGOGASTRODUODENOSCOPY);  Surgeon: Pieter Gomez MD;  Location: [facility] (2ND FLR);  Service: Endoscopy;  Laterality: N/A;  Issac pt  prep instructions sent to pt via portal    2/19-precall complete-MS    OOPHORECTOMY Right 1970s    TRANSFORAMINAL EPIDURAL INJECTION OF STEROID Left 7/31/2024    Procedure: LUMBAR TRANSFORAMINAL LEFT L4/5 AND L5/S1 DIRECT REFERRAL;  Surgeon: Cresencio Spivey MD;  Location: [facility];  Service: Pain Management;  Laterality: Left;  [phone]     Social History     Socioeconomic History    Marital  status: Single    Number of children: 1   Occupational History    Occupation: Works Security     Employer: [employer]   Tobacco Use    Smoking status: Never     Passive exposure: Never    Smokeless tobacco: Never   Substance and Sexual Activity    Alcohol use: Yes     Comment: rare    Drug use: Never    Sexual activity: Yes     Partners: Male   Social History Narrative    The patient does exercise regularly (bikes QW-BIW).      She is not satisfied with weight.    Rates diet as good.    She does drink at least 1/2 gallon water daily.    She drinks 0 coffee/tea/caffeine-containing soft drinks daily.    Total sleep time at night is 8 hours.    She works 40 hours per week.    She does wear seat belts.    Hobbies include none.     Social Determinants of Health     Financial Resource Strain: Medium Risk (7/24/2024)    Overall Financial Resource Strain (CARDIA)     Difficulty of Paying Living Expenses: Somewhat hard   Food Insecurity: Food Insecurity Present (7/24/2024)    Hunger Vital Sign     Worried About Running Out of Food in the Last Year: Sometimes true     Ran Out of Food in the Last Year: Sometimes true   Physical Activity: Sufficiently Active (7/24/2024)    Exercise Vital Sign     Days of Exercise per Week: 3 days     Minutes of Exercise per Session: 110 min   Stress: No Stress Concern Present (7/24/2024)    [employer] - Occupational Stress Questionnaire     Feeling of Stress : Only a little   Housing Stability: Unknown (7/24/2024)    Housing Stability Vital Sign     Unable to Pay for Housing in the Last Year: No     Family History   Problem Relation Name Age of Onset    Heart disease Mother  65        MI    Hypertension Mother      Kidney disease Mother      Bladder Cancer Father          bladder    Breast cancer Sister  45    No Known Problems Brother      Cancer Paternal Uncle          colon cancer        Review of patient's allergies indicates:   Allergen  Reactions    Codeine Itching    Sulfa (sulfonamide antibiotics) Itching       Current Outpatient Medications   Medication Sig    acyclovir (ZOVIRAX) 400 MG tablet Take 1 tablet (400 mg total) by mouth 2 (two) times daily. (Patient not taking: Reported on 9/19/2024)    albuterol (ACCUNEB) 1.25 mg/3 mL Nebu Take 3 mLs (1.25 mg total) by nebulization every 6 (six) hours as needed. Rescue (Patient not taking: Reported on 7/25/2024)    albuterol (VENTOLIN HFA) 90 mcg/actuation inhaler Inhale 2 puffs into the lungs every 6 (six) hours as needed for Wheezing. Rescue (Patient not taking: Reported on 7/25/2024)    ALPRAZolam (XANAX) 0.25 MG tablet Take 1 tablet (0.25 mg total) by mouth 2 (two) times daily as needed for Anxiety. (Patient not taking: Reported on 9/19/2024)    cetirizine (ZYRTEC) 10 MG tablet TAKE 1 TABLET BY MOUTH ONCE DAILY (Patient not taking: Reported on 9/19/2024)    cyclobenzaprine (FLEXERIL) 5 MG tablet Take 1 tablet (5 mg total) by mouth nightly as needed for Muscle spasms. (Patient not taking: Reported on 9/19/2024)    fluticasone propionate (FLONASE) 50 mcg/actuation nasal spray 1 Spray(s) Both Nares Daily PRN (Patient not taking: Reported on 9/19/2024)    gabapentin (NEURONTIN) 300 MG capsule TAKE UP TO 6 CAPSULES BY MOUTH EVERY DAY (Patient not taking: Reported on 9/19/2024)    ibuprofen (ADVIL,MOTRIN) 600 MG tablet Take 1 tablet (600 mg total) by mouth every 8 (eight) hours as needed for Pain. (Patient not taking: Reported on 9/19/2024)    mometasone (NASONEX) 50 mcg/actuation nasal spray 1-2 sprays by Nasal route once daily. (Patient not taking: Reported on 9/19/2024)    nebulizers Misc Use as instructed daily prn with nebulizer medication (Patient not taking: Reported on 9/19/2024)    pantoprazole (PROTONIX) 40 MG tablet Take 1 tablet (40 mg total) by mouth once daily. (Patient not taking: Reported on 9/19/2024)    terconazole (TERAZOL 7) 0.4 % Crea Place 1 applicator vaginally every evening.  (Patient not taking: Reported on 9/19/2024)     No current facility-administered medications for this visit.           ROS:  GENERAL: No fever. No chills. No fatigue. Denies weight loss. Denies weight gain.  HEENT: Denies headaches. Denies vision change. Denies eye pain. Denies double vision. Denies ear pain.   CV: Denies chest pain.   PULM: Denies of shortness of breath.  GI: Denies constipation. No diarrhea. No abdominal pain. Denies nausea. Denies vomiting. No blood in stool.  HEME: Denies bleeding problems.  : Denies urgency. No painful urination. No blood in urine.  MS: Denies joint stiffness. Denies joint swelling.  Denies back pain.  SKIN: Denies rash.   NEURO: Denies seizures. No weakness.  PSYCH:  Denies difficulty sleeping. No anxiety. Denies depression. No suicidal thoughts.       VITALS:   Vitals:    09/19/24 1548   BP: 117/65   Pulse: 86   Resp: 18   Temp: 98 °F (36.7 °C)   SpO2: 100%   Weight: 65.8 kg (145 lb 1 oz)   PainSc:   6   PainLoc: Leg         PHYSICAL EXAM:   GENERAL: Well appearing, in no acute distress, alert and oriented x3.  PSYCH:  Mood and affect appropriate.  SKIN: Skin color, texture, turgor normal, no rashes or lesions.  HEENT:  Normocephalic, atraumatic. Cranial nerves grossly intact.  NECK:   No pain to palpation over the cervical paraspinous muscles.   no pain to palpation over facets.   no pain with neck flexion, extension, or lateral flexion.   Spurling test negative  PULM: No evidence of respiratory difficulty, symmetric chest rise.  GI:  Non-distended  BACK:   Normal range of motion.   No pain to palpation over the spinous processes.   Mild pain to palpation over facet joints.   Negative axial loading test bilateral.   Negative tenderness over bilateral SIJ. Negative thigh and sacral thrust test, Negative FABERE, Ganselin and Yeoman's test.   GTB tenderness bilaterally   EXTREMITIES:   No deformities, edema, or skin discoloration.   Shoulder, hip, and knee provocative  maneuvers are negative. No atrophy is noted.   Negative FADIR    Negative for knee limited ROM, negative ant and post drawer sign.  NEURO: Sensation is equal and appropriate bilaterally. Bilateral upper and lower extremity strength is normal and symmetric. Bilateral upper and lower extremity coordination and muscle stretch reflexes are physiologic and symmetric. Plantar response are downgoing. Straight leg raising in the supine position is negative to radicular pain.   GAIT: normal             ASSESSMENT: 64 y.o. year old with low back pain, consistent with:    1. Lumbar radiculopathy  Procedure Request Order for Pain Management    Ambulatory referral/consult to [facility]      2. Fall, initial encounter  X-Ray Femur 2 View Right    X-Ray Knee Complete 4 Or More Views Right            PLAN:    Patient Education:  Discussed the importance of physical therapy, activity modification, and a home exercise plan to help with pain and improve health.  Discussed patient's MRI which I personally reviewed. She has noticeable disc bulge's at L3/4, L4/5, and L5/S1 causing neural foraminal narrowing on the L side.   Will obtain XR of her R leg to evaluate her post fall pain.   Therapy referral:  Will provide new referral to physical therapy to update her home exercise program - specifically with [facility].   Medications:   Patient can continue with pain medications for now per their provider since they are providing benefits, using them appropriately, and without side effects.  Schedule pain intervention for: L L3/4 TFESI as the distribution of her pain down the lateral to the knee and MRI results believe this will help relieve her L leg pain.    Future consideration: evaluate R leg XR   Counseled patient: regarding the importance of activity modification, constant sleeping habits, and physical therapy.  Return to clinic: 2 weeks after procedure with DANII       Keyshawn Cr   I have personally reviewed the  history and exam of this patient and agree with the resident/fellow/NPs note as stated above.      Cresencio Spivey MD      09/19/2024

## 2024-09-19 NOTE — H&P (VIEW-ONLY)
Chronic Pain - New Consult    PCP: India Camacho MD    REFERRING PHYSICIAN: No ref. provider found    CHIEF COMPLAINT: lower back pain     Original HISTORY OF PRESENT ILLNESS: Val Horvath presents to the clinic for the evaluation of the above pain. . Patient was seen by Orthopedic PA Ade Gutierres and was sent to procedure clinic for Left L4/5 and L5/S1 TFESI which she underwent 7/31/24. She had 80% relief of her lower back pain, but this did not relieve her leg pain. Patient reports the back pain is well controlled now. She is most concerned about shooting pain down L leg to the L knee (does not extend to the foot).     Original Pain Description:  The pain is located in the R anterior thigh and the L buttock radiating down the left lateral thigh. The right leg pain started after a fall at work in July (no litigation involved with work). The pain is described as aching and throbbing. Exacerbating factors: wearing tight clothes and going up stairs. Mitigating factors laying down and rest. Symptoms interfere with daily activity, sleeping, and work. The patient feels like symptoms have been unchanged. Patient denies night fever/night sweats, urinary incontinence, bowel incontinence, significant weight loss, significant motor weakness, and loss of sensations.    Original PAIN SCORES:  Best: Pain is 5  Worst: Pain is 9  Current: Pain is 6        9/19/2024     3:47 PM   Last 3 PDI Scores   Pain Disability Index (PDI) 42           Conservative therapy:  PT: not currently participating   Chiro:  HEP in the past 6 months: patient preforming daily HEP with limited benefit       Treatments / Medications: (Ice/Heat/NSAIDS/APAP/etc):  Tylenol  Ice   Unable to take NSAIDs 2/2 gastric issues       Interventional Pain Procedures: (Previous injections)  7/31/24 - Left L4/5 and L5/S1 TFESI            IMAGING:    MRI LUMBAR SPINE WITHOUT CONTRAST 7/8/24     CLINICAL HISTORY:  Lumbar radiculopathy, symptoms persist  with conservative treatment; Radiculopathy, lumbar region     TECHNIQUE:  Multiplanar, multisequence MR images were acquired from the thoracolumbar junction to the sacrum without contrast.     COMPARISON:  XR L-spine 04/24/2024.     FINDINGS:  Alignment: Normal sagittal alignment.  No spondylolisthesis.     Vertebrae: Vertebral body heights are maintained.  No fracture or marrow infiltrative process.     Discs: Degenerative disc desiccation with mild height loss at L3-L4 and L4-L5.  Trace bone marrow edema/Modic 1 changes along the posterior aspect of the L4 superior endplate.     Cord: Conus terminates at L2 and appears unremarkable.  Cauda equina appears unremarkable.     Degenerative findings:     T12-L1: No spinal canal stenosis or neural foraminal narrowing.     L1-L2: No spinal canal stenosis or neural foraminal narrowing.     L2-L3: No spinal canal stenosis or neural foraminal narrowing.     L3-L4: Circumferential disc bulge causes mass effect on the ventral thecal sac and lateral recesses and likely abuts the bilateral descending L4 nerve root.  Bilateral ligamentum flavum hypertrophy.  Mild spinal canal stenosis.  Mild-to-moderate left neural foraminal narrowing.     L4-L5: Circumferential disc bulge causes mass effect on the ventral thecal sac and lateral recesses and closely approximates the bilateral descending L5 nerve roots.  Right extraforaminal broad-based protrusion closely approximates the right exiting L4 nerve root.  No spinal canal stenosis.  Mild right neural foraminal narrowing.     L5-S1: Posterior central/left subarticular broad-based disc bulge closely approximates the left descending S1 nerve root.  Bilateral facet arthropathy with mild right-sided periarticular soft tissue edema.  No spinal canal stenosis.  Mild left neural foraminal narrowing.     Paraspinal muscles & soft tissues: Paraspinal muscles are unremarkable.  Partially visualized T2 hyperintense lesion within the right hepatic  lobe, possibly a small cyst or benign hemangioma.  Few small bilateral simple renal cysts.     Impression:     Multilevel lumbar spondylosis as detailed above.  Mild spinal canal stenosis at L3-L4.  Multilevel mild-to-moderate neural foraminal narrowing.      Past Medical History:   Diagnosis Date    Anxiety     AR (allergic rhinitis) 3/10/2014    Diverticulosis 2010    Family history of breast cancer in sister     Genital herpes     Hiatal hernia 2010    Irritable bowel syndrome     Meningioma 3/10/2014    MRI 2/14    Post herpetic neuralgia 1/21/2014 1/14      Past Surgical History:   Procedure Laterality Date    CARPAL TUNNEL RELEASE Right 1990s    COLONOSCOPY N/A 12/2/2020    Procedure: COLONOSCOPY;  Surgeon: Pieter Gomez MD;  Location: [facility] (4TH FLR);  Service: Endoscopy;  Laterality: N/A;  patient due for colonoscopy in November 2020  prep ins. emailed  and mailed - COVID screening on 11/29/20 [facility]  patient confirmed new arrival time of 0900-BB    ESOPHAGOGASTRODUODENOSCOPY N/A 12/1/2023    Procedure: EGD (ESOPHAGOGASTRODUODENOSCOPY);  Surgeon: Pieter Gomez MD;  Location: [facility] (4TH FLR);  Service: Endoscopy;  Laterality: N/A;  time frame 5-12 weeks  dr. gomez patient  [facility] -  pt is hypoglycemic  11/24 precall complete-ml    ESOPHAGOGASTRODUODENOSCOPY N/A 2/26/2024    Procedure: EGD (ESOPHAGOGASTRODUODENOSCOPY);  Surgeon: Pieter Gomez MD;  Location: [facility] (2ND FLR);  Service: Endoscopy;  Laterality: N/A;  Issac pt  prep instructions sent to pt via portal    2/19-precall complete-MS    OOPHORECTOMY Right 1970s    TRANSFORAMINAL EPIDURAL INJECTION OF STEROID Left 7/31/2024    Procedure: LUMBAR TRANSFORAMINAL LEFT L4/5 AND L5/S1 DIRECT REFERRAL;  Surgeon: Cresencio Spivey MD;  Location: [facility] PAIN MGT;  Service: Pain Management;  Laterality: Left;  [phone]     Social History     Socioeconomic History    Marital  status: Single    Number of children: 1   Occupational History    Occupation: Works Security     Employer: [employer]   Tobacco Use    Smoking status: Never     Passive exposure: Never    Smokeless tobacco: Never   Substance and Sexual Activity    Alcohol use: Yes     Comment: rare    Drug use: Never    Sexual activity: Yes     Partners: Male   Social History Narrative    The patient does exercise regularly (bikes QW-BIW).      She is not satisfied with weight.    Rates diet as good.    She does drink at least 1/2 gallon water daily.    She drinks 0 coffee/tea/caffeine-containing soft drinks daily.    Total sleep time at night is 8 hours.    She works 40 hours per week.    She does wear seat belts.    Hobbies include none.     Social Determinants of Health     Financial Resource Strain: Medium Risk (7/24/2024)    Overall Financial Resource Strain (CARDIA)     Difficulty of Paying Living Expenses: Somewhat hard   Food Insecurity: Food Insecurity Present (7/24/2024)    Hunger Vital Sign     Worried About Running Out of Food in the Last Year: Sometimes true     Ran Out of Food in the Last Year: Sometimes true   Physical Activity: Sufficiently Active (7/24/2024)    Exercise Vital Sign     Days of Exercise per Week: 3 days     Minutes of Exercise per Session: 110 min   Stress: No Stress Concern Present (7/24/2024)    [employer] [place] [employer] - Occupational Stress Questionnaire     Feeling of Stress : Only a little   Housing Stability: Unknown (7/24/2024)    Housing Stability Vital Sign     Unable to Pay for Housing in the Last Year: No     Family History   Problem Relation Name Age of Onset    Heart disease Mother  65        MI    Hypertension Mother      Kidney disease Mother      Bladder Cancer Father          bladder    Breast cancer Sister  45    No Known Problems Brother      Cancer Paternal Uncle          colon cancer        Review of patient's allergies indicates:   Allergen  Reactions    Codeine Itching    Sulfa (sulfonamide antibiotics) Itching       Current Outpatient Medications   Medication Sig    acyclovir (ZOVIRAX) 400 MG tablet Take 1 tablet (400 mg total) by mouth 2 (two) times daily. (Patient not taking: Reported on 9/19/2024)    albuterol (ACCUNEB) 1.25 mg/3 mL Nebu Take 3 mLs (1.25 mg total) by nebulization every 6 (six) hours as needed. Rescue (Patient not taking: Reported on 7/25/2024)    albuterol (VENTOLIN HFA) 90 mcg/actuation inhaler Inhale 2 puffs into the lungs every 6 (six) hours as needed for Wheezing. Rescue (Patient not taking: Reported on 7/25/2024)    ALPRAZolam (XANAX) 0.25 MG tablet Take 1 tablet (0.25 mg total) by mouth 2 (two) times daily as needed for Anxiety. (Patient not taking: Reported on 9/19/2024)    cetirizine (ZYRTEC) 10 MG tablet TAKE 1 TABLET BY MOUTH ONCE DAILY (Patient not taking: Reported on 9/19/2024)    cyclobenzaprine (FLEXERIL) 5 MG tablet Take 1 tablet (5 mg total) by mouth nightly as needed for Muscle spasms. (Patient not taking: Reported on 9/19/2024)    fluticasone propionate (FLONASE) 50 mcg/actuation nasal spray 1 Spray(s) Both Nares Daily PRN (Patient not taking: Reported on 9/19/2024)    gabapentin (NEURONTIN) 300 MG capsule TAKE UP TO 6 CAPSULES BY MOUTH EVERY DAY (Patient not taking: Reported on 9/19/2024)    ibuprofen (ADVIL,MOTRIN) 600 MG tablet Take 1 tablet (600 mg total) by mouth every 8 (eight) hours as needed for Pain. (Patient not taking: Reported on 9/19/2024)    mometasone (NASONEX) 50 mcg/actuation nasal spray 1-2 sprays by Nasal route once daily. (Patient not taking: Reported on 9/19/2024)    nebulizers Misc Use as instructed daily prn with nebulizer medication (Patient not taking: Reported on 9/19/2024)    pantoprazole (PROTONIX) 40 MG tablet Take 1 tablet (40 mg total) by mouth once daily. (Patient not taking: Reported on 9/19/2024)    terconazole (TERAZOL 7) 0.4 % Crea Place 1 applicator vaginally every evening.  (Patient not taking: Reported on 9/19/2024)     No current facility-administered medications for this visit.           ROS:  GENERAL: No fever. No chills. No fatigue. Denies weight loss. Denies weight gain.  HEENT: Denies headaches. Denies vision change. Denies eye pain. Denies double vision. Denies ear pain.   CV: Denies chest pain.   PULM: Denies of shortness of breath.  GI: Denies constipation. No diarrhea. No abdominal pain. Denies nausea. Denies vomiting. No blood in stool.  HEME: Denies bleeding problems.  : Denies urgency. No painful urination. No blood in urine.  MS: Denies joint stiffness. Denies joint swelling.  Denies back pain.  SKIN: Denies rash.   NEURO: Denies seizures. No weakness.  PSYCH:  Denies difficulty sleeping. No anxiety. Denies depression. No suicidal thoughts.       VITALS:   Vitals:    09/19/24 1548   BP: 117/65   Pulse: 86   Resp: 18   Temp: 98 °F (36.7 °C)   SpO2: 100%   Weight: 65.8 kg (145 lb 1 oz)   PainSc:   6   PainLoc: Leg         PHYSICAL EXAM:   GENERAL: Well appearing, in no acute distress, alert and oriented x3.  PSYCH:  Mood and affect appropriate.  SKIN: Skin color, texture, turgor normal, no rashes or lesions.  HEENT:  Normocephalic, atraumatic. Cranial nerves grossly intact.  NECK:   No pain to palpation over the cervical paraspinous muscles.   no pain to palpation over facets.   no pain with neck flexion, extension, or lateral flexion.   Spurling test negative  PULM: No evidence of respiratory difficulty, symmetric chest rise.  GI:  Non-distended  BACK:   Normal range of motion.   No pain to palpation over the spinous processes.   Mild pain to palpation over facet joints.   Negative axial loading test bilateral.   Negative tenderness over bilateral SIJ. Negative thigh and sacral thrust test, Negative FABERE, Ganselin and Yeoman's test.   GTB tenderness bilaterally   EXTREMITIES:   No deformities, edema, or skin discoloration.   Shoulder, hip, and knee provocative  maneuvers are negative. No atrophy is noted.   Negative FADIR    Negative for knee limited ROM, negative ant and post drawer sign.  NEURO: Sensation is equal and appropriate bilaterally. Bilateral upper and lower extremity strength is normal and symmetric. Bilateral upper and lower extremity coordination and muscle stretch reflexes are physiologic and symmetric. Plantar response are downgoing. Straight leg raising in the supine position is negative to radicular pain.   GAIT: normal             ASSESSMENT: 64 y.o. year old with low back pain, consistent with:    1. Lumbar radiculopathy  Procedure Request Order for Pain Management    Ambulatory referral/consult to [facility]      2. Fall, initial encounter  X-Ray Femur 2 View Right    X-Ray Knee Complete 4 Or More Views Right            PLAN:    Patient Education:  Discussed the importance of physical therapy, activity modification, and a home exercise plan to help with pain and improve health.  Discussed patient's MRI which I personally reviewed. She has noticeable disc bulge's at L3/4, L4/5, and L5/S1 causing neural foraminal narrowing on the L side.   Will obtain XR of her R leg to evaluate her post fall pain.   Therapy referral:  Will provide new referral to physical therapy to update her home exercise program - specifically with [facility].   Medications:   Patient can continue with pain medications for now per their provider since they are providing benefits, using them appropriately, and without side effects.  Schedule pain intervention for: L L3/4 TFESI as the distribution of her pain down the lateral to the knee and MRI results believe this will help relieve her L leg pain.    Future consideration: evaluate R leg XR   Counseled patient: regarding the importance of activity modification, constant sleeping habits, and physical therapy.  Return to clinic: 2 weeks after procedure with DANII       Keyshawn Cr   I have personally reviewed the  history and exam of this patient and agree with the resident/fellow/NPs note as stated above.      Cresencio Spivey MD      09/19/2024

## 2024-09-20 ENCOUNTER — PATIENT MESSAGE (OUTPATIENT)
Dept: PAIN MEDICINE | Facility: OTHER | Age: 64
End: 2024-09-20
Payer: COMMERCIAL

## 2024-09-20 DIAGNOSIS — M54.16 LUMBAR RADICULOPATHY: Primary | ICD-10-CM

## 2024-09-30 RX ORDER — PANTOPRAZOLE SODIUM 40 MG/1
40 TABLET, DELAYED RELEASE ORAL
Qty: 90 TABLET | Refills: 3 | Status: SHIPPED | OUTPATIENT
Start: 2024-09-30

## 2024-10-01 ENCOUNTER — PATIENT MESSAGE (OUTPATIENT)
Dept: INTERNAL MEDICINE | Facility: CLINIC | Age: 64
End: 2024-10-01
Payer: COMMERCIAL

## 2024-10-09 ENCOUNTER — PATIENT MESSAGE (OUTPATIENT)
Dept: ADMINISTRATIVE | Facility: OTHER | Age: 64
End: 2024-10-09
Payer: COMMERCIAL

## 2024-10-14 ENCOUNTER — HOSPITAL ENCOUNTER (OUTPATIENT)
Dept: RADIOLOGY | Facility: HOSPITAL | Age: 64
Discharge: HOME | End: 2024-10-14
Attending: STUDENT IN AN ORGANIZED HEALTH CARE EDUCATION/TRAINING PROGRAM
Payer: COMMERCIAL
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
## 2024-10-14 PROCEDURE — 73552 X-RAY EXAM OF FEMUR 2/>: CPT | Mod: 26,RT,, | Performed by: RADIOLOGY

## 2024-10-14 PROCEDURE — 73552 X-RAY EXAM OF FEMUR 2/>: CPT | Mod: TC,PN,RT

## 2024-10-16 ENCOUNTER — HOSPITAL ENCOUNTER (OUTPATIENT)
Facility: OTHER | Age: 64
Discharge: HOME | End: 2024-10-16
Attending: ANESTHESIOLOGY | Admitting: ANESTHESIOLOGY
Payer: COMMERCIAL

## 2024-10-16 ENCOUNTER — TELEPHONE (OUTPATIENT)
Dept: PAIN MEDICINE | Facility: CLINIC | Age: 64
End: 2024-10-16
Payer: COMMERCIAL

## 2024-10-16 VITALS
WEIGHT: 143 LBS | DIASTOLIC BLOOD PRESSURE: 59 MMHG | RESPIRATION RATE: 18 BRPM | HEIGHT: 60 IN | OXYGEN SATURATION: 98 % | TEMPERATURE: 98 F | HEART RATE: 70 BPM | BODY MASS INDEX: 28.07 KG/M2 | SYSTOLIC BLOOD PRESSURE: 119 MMHG

## 2024-10-16 DIAGNOSIS — M51.369 DEGENERATION OF INTERVERTEBRAL DISC OF LUMBAR REGION, UNSPECIFIED WHETHER PAIN PRESENT: ICD-10-CM

## 2024-10-16 DIAGNOSIS — G89.29 CHRONIC PAIN: ICD-10-CM

## 2024-10-16 DIAGNOSIS — M54.16 LUMBAR RADICULOPATHY: Primary | ICD-10-CM

## 2024-10-16 PROCEDURE — 99152 MOD SED SAME PHYS/QHP 5/>YRS: CPT | Performed by: ANESTHESIOLOGY

## 2024-10-16 PROCEDURE — 25500020 PHARM REV CODE 255: Performed by: ANESTHESIOLOGY

## 2024-10-16 PROCEDURE — 64483 NJX AA&/STRD TFRM EPI L/S 1: CPT | Mod: LT | Performed by: ANESTHESIOLOGY

## 2024-10-16 PROCEDURE — 63600175 PHARM REV CODE 636 W HCPCS: Performed by: ANESTHESIOLOGY

## 2024-10-16 PROCEDURE — 64483 NJX AA&/STRD TFRM EPI L/S 1: CPT | Mod: LT,,, | Performed by: ANESTHESIOLOGY

## 2024-10-16 RX ORDER — MIDAZOLAM HYDROCHLORIDE 1 MG/ML
INJECTION INTRAMUSCULAR; INTRAVENOUS
Status: DISCONTINUED | OUTPATIENT
Start: 2024-10-16 | End: 2024-10-16 | Stop reason: HOSPADM

## 2024-10-16 RX ORDER — SODIUM CHLORIDE 9 MG/ML
INJECTION, SOLUTION INTRAVENOUS CONTINUOUS
Status: DISCONTINUED | OUTPATIENT
Start: 2024-10-16 | End: 2024-10-16 | Stop reason: HOSPADM

## 2024-10-16 RX ORDER — LIDOCAINE HYDROCHLORIDE 10 MG/ML
INJECTION, SOLUTION EPIDURAL; INFILTRATION; INTRACAUDAL; PERINEURAL
Status: DISCONTINUED | OUTPATIENT
Start: 2024-10-16 | End: 2024-10-16 | Stop reason: HOSPADM

## 2024-10-16 RX ORDER — DEXAMETHASONE SODIUM PHOSPHATE 10 MG/ML
INJECTION INTRAMUSCULAR; INTRAVENOUS
Status: DISCONTINUED | OUTPATIENT
Start: 2024-10-16 | End: 2024-10-16 | Stop reason: HOSPADM

## 2024-10-16 RX ORDER — FENTANYL CITRATE 50 UG/ML
INJECTION, SOLUTION INTRAMUSCULAR; INTRAVENOUS
Status: DISCONTINUED | OUTPATIENT
Start: 2024-10-16 | End: 2024-10-16 | Stop reason: HOSPADM

## 2024-10-16 RX ORDER — LIDOCAINE HYDROCHLORIDE 20 MG/ML
INJECTION, SOLUTION INFILTRATION; PERINEURAL
Status: DISCONTINUED | OUTPATIENT
Start: 2024-10-16 | End: 2024-10-16 | Stop reason: HOSPADM

## 2024-10-16 NOTE — TELEPHONE ENCOUNTER
Staff spoke with patient pt expressed that she wouldn't be able to make it to her 2 week follow up, staff changed pt appointment to virtual.

## 2024-10-16 NOTE — DISCHARGE SUMMARY
Discharge Note  Short Stay      SUMMARY     Admit Date: 10/16/2024    Attending Physician: Cresencio Spivey MD    Discharge Physician: Cresencio Spivey MD      Discharge Date: 10/16/2024 9:55 AM    Procedure(s) (LRB):  LUMBAR TRANSFORAMINAL LEFT L3/4 (Left)    Final Diagnosis: Lumbar radiculopathy [M54.16]    Disposition: Home or self care    Patient Instructions:   Current Discharge Medication List        CONTINUE these medications which have NOT CHANGED    Details   acyclovir (ZOVIRAX) 400 MG tablet Take 1 tablet (400 mg total) by mouth 2 (two) times daily.  Qty: 180 tablet, Refills: 3      albuterol (ACCUNEB) 1.25 mg/3 mL Nebu Take 3 mLs (1.25 mg total) by nebulization every 6 (six) hours as needed. Rescue  Qty: 75 mL, Refills: 0    Associated Diagnoses: Lower respiratory infection      albuterol (VENTOLIN HFA) 90 mcg/actuation inhaler Inhale 2 puffs into the lungs every 6 (six) hours as needed for Wheezing. Rescue  Qty: 18 g, Refills: 2    Associated Diagnoses: Lower respiratory infection      ALPRAZolam (XANAX) 0.25 MG tablet Take 1 tablet (0.25 mg total) by mouth 2 (two) times daily as needed for Anxiety.  Qty: 180 tablet, Refills: 1      cetirizine (ZYRTEC) 10 MG tablet TAKE 1 TABLET BY MOUTH ONCE DAILY  Qty: 90 tablet, Refills: 3    Associated Diagnoses: Allergic rhinitis, unspecified seasonality, unspecified trigger      cyclobenzaprine (FLEXERIL) 5 MG tablet Take 1 tablet (5 mg total) by mouth nightly as needed for Muscle spasms.  Qty: 30 tablet, Refills: 5      fluticasone propionate (FLONASE) 50 mcg/actuation nasal spray 1 Spray(s) Both Nares Daily PRN  Qty: 18.2 mL, Refills: 11      gabapentin (NEURONTIN) 300 MG capsule TAKE UP TO 6 CAPSULES BY MOUTH EVERY DAY  Qty: 180 capsule, Refills: 11    Associated Diagnoses: HZV (herpes zoster virus) post herpetic neuralgia; Post herpetic neuralgia      ibuprofen (ADVIL,MOTRIN) 600 MG tablet Take 1 tablet (600 mg total) by mouth every 8 (eight) hours as  needed for Pain.  Qty: 20 tablet, Refills: 0    Associated Diagnoses: Chronic left hip pain      mometasone (NASONEX) 50 mcg/actuation nasal spray 1-2 sprays by Nasal route once daily.  Qty: 17 g, Refills: 11      nebulizers Misc Use as instructed daily prn with nebulizer medication  Qty: 1 each, Refills: 0      pantoprazole (PROTONIX) 40 MG tablet TAKE 1 TABLET(40 MG) BY MOUTH DAILY  Qty: 90 tablet, Refills: 3    Comments: ZERO refills remain on this prescription. Your patient is requesting advance approval of refills for this medication to PREVENT ANY MISSED DOSES      terconazole (TERAZOL 7) 0.4 % Crea Place 1 applicator vaginally every evening.  Qty: 45 g, Refills: 0    Associated Diagnoses: Candida vaginitis                 Discharge Diagnosis: Lumbar radiculopathy [M54.16]  Condition on Discharge: Stable with no complications to procedure   Diet on Discharge: Same as before.  Activity: as per instruction sheet.  Discharge to: Home with a responsible adult.  Follow up: 2-4 weeks       Please call my office or pager at [phone] if experienced any weakness or loss of sensation, fever > 101.5, pain uncontrolled with oral medications, persistent nausea/vomiting/or diarrhea, redness or drainage from the incisions, or any other worrisome concerns. If physician on call was not reached or could not communicate with our office for any reason please go to the nearest emergency department     Karoline Morillo MD

## 2024-10-16 NOTE — OP NOTE
Lumbar Transforaminal Epidural Steroid Injection under Fluoroscopic Guidance    The procedure, risks, benefits, and options were discussed with the patient. There are no contraindications to the procedure. The patent expressed understanding and agreed to the procedure. Informed written consent was obtained prior to the start of the procedure and can be found in the patient's chart.    PATIENT NAME: Val Horvath   MRN: [id number]     DATE OF PROCEDURE: 10/16/2024    PROCEDURE:  Left  L3/4 Lumbar Transforaminal Epidural Steroid Injection under Fluoroscopic Guidance    PRE-OP DIAGNOSIS: Lumbar radiculopathy [M54.16] Lumbar radiculopathy [M54.16]    POST-OP DIAGNOSIS: Same    PHYSICIAN: Cresencio Spivey MD    ASSISTANTS: Karoline Morillo MD     MEDICATIONS INJECTED: Preservative-free Decadron 10mg with 5cc of Lidocaine 1% MPF     LOCAL ANESTHETIC INJECTED: Xylocaine 2%     SEDATION: Versed 2mg and Fentanyl 50mcg                                                                                                                                                                                     Conscious sedation ordered by M.D. Patient re-evaluation prior to administration of conscious sedation. No changes noted in patient's status from initial evaluation. The patient's vital signs were monitored by RN and patient remained hemodynamically stable throughout the procedure.    Event Time In   Sedation Start 0942   Sedation End 0953       ESTIMATED BLOOD LOSS: None    COMPLICATIONS: None    TECHNIQUE: Time-out was performed to identify the patient and procedure to be performed. With the patient laying in a prone position, the surgical area was prepped and draped in the usual sterile fashion using ChloraPrep and a fenestrated drape.The levels were determined under fluoroscopy guidance. Skin anesthesia was achieved by injecting Lidocaine 2% over the injection sites. The transforaminal spaces were then approached with a 22 gauge,  3.5 inch spinal quinke needle that was introduced under fluoroscopic guidance in the AP and Lateral views. Once the needle tip was in the area of the transforaminal space, and there was no blood, CSF or paraesthesias, contrast dye Omnipaque (300mg/mL) was injected to confirm placement and there was no vascular runoff. Fluoroscopic imaging in the AP and lateral views revealed a clear outline of the spinal nerve with proximal spread of agent through the neural foramen into the epidural space. 3 mL of the medication mixture listed above was injected slowly at each site. Displacement of the radio opaque contrast after injection of the medication confirmed that the medication went into the area of the transforaminal spaces. The needles were removed and bleeding was nil. A sterile dressing was applied. No specimens collected. The patient tolerated the procedure well.     The patient was monitored after the procedure in the recovery area. They were given post-procedure and discharge instructions to follow at home. The patient was discharged in a stable condition.    I reviewed and edited the fellow's note. I conducted my own interview and physical examination. I agree with the findings. I was present and supervising all critical portions of the procedure.    Cresencio Spivey MD

## 2024-10-16 NOTE — DISCHARGE INSTRUCTIONS

## 2024-10-29 ENCOUNTER — TELEPHONE (OUTPATIENT)
Dept: PAIN MEDICINE | Facility: CLINIC | Age: 64
End: 2024-10-29
Payer: COMMERCIAL

## 2024-10-29 ENCOUNTER — TELEPHONE (OUTPATIENT)
Dept: INTERNAL MEDICINE | Facility: CLINIC | Age: 64
End: 2024-10-29
Payer: COMMERCIAL

## 2024-10-29 DIAGNOSIS — H02.9 LESION OF RIGHT UPPER EYELID: Primary | ICD-10-CM

## 2024-10-29 NOTE — TELEPHONE ENCOUNTER
"----- Message from Garrett sent at 10/29/2024  8:52 AM CDT -----  Consult/Advisory:    Name Of Caller: Self    Contact Preference?:  [phone]     Provider Name: Cris    What is the nature of the call?: Requesting referral to see Dr. Reilly (Ophthalmology) due to knot-like bump on upper right eye lid    Additional Notes:  "Thank you for all that you do for our patients"  "

## 2024-10-30 ENCOUNTER — TELEPHONE (OUTPATIENT)
Dept: PAIN MEDICINE | Facility: CLINIC | Age: 64
End: 2024-10-30

## 2024-10-30 ENCOUNTER — CLINICAL SUPPORT (OUTPATIENT)
Dept: REHABILITATION | Facility: OTHER | Age: 64
End: 2024-10-30
Attending: ANESTHESIOLOGY
Payer: COMMERCIAL

## 2024-10-30 ENCOUNTER — OFFICE VISIT (OUTPATIENT)
Dept: PAIN MEDICINE | Facility: CLINIC | Age: 64
End: 2024-10-30
Payer: COMMERCIAL

## 2024-10-30 DIAGNOSIS — R29.898 DECREASED STRENGTH OF TRUNK AND BACK: ICD-10-CM

## 2024-10-30 DIAGNOSIS — M47.816 FACET ARTHROPATHY, LUMBAR: ICD-10-CM

## 2024-10-30 DIAGNOSIS — M47.817 FACET ARTHRITIS OF LUMBOSACRAL REGION: ICD-10-CM

## 2024-10-30 DIAGNOSIS — M51.360 DEGENERATION OF INTERVERTEBRAL DISC OF LUMBAR REGION WITH DISCOGENIC BACK PAIN: ICD-10-CM

## 2024-10-30 DIAGNOSIS — G89.4 CHRONIC PAIN SYNDROME: ICD-10-CM

## 2024-10-30 DIAGNOSIS — M54.16 LUMBAR RADICULOPATHY: ICD-10-CM

## 2024-10-30 DIAGNOSIS — M47.816 LUMBAR SPONDYLOSIS: ICD-10-CM

## 2024-10-30 DIAGNOSIS — M54.16 LUMBAR RADICULOPATHY: Primary | ICD-10-CM

## 2024-10-30 DIAGNOSIS — R29.898 WEAKNESS OF BOTH LOWER EXTREMITIES: Primary | ICD-10-CM

## 2024-10-30 DIAGNOSIS — M70.62 GREATER TROCHANTERIC BURSITIS OF LEFT HIP: Primary | ICD-10-CM

## 2024-10-30 PROCEDURE — 3044F HG A1C LEVEL LT 7.0%: CPT | Mod: CPTII,95,,

## 2024-10-30 PROCEDURE — 97161 PT EVAL LOW COMPLEX 20 MIN: CPT

## 2024-10-30 PROCEDURE — 97530 THERAPEUTIC ACTIVITIES: CPT

## 2024-10-30 PROCEDURE — 1160F RVW MEDS BY RX/DR IN RCRD: CPT | Mod: CPTII,95,,

## 2024-10-30 PROCEDURE — 1159F MED LIST DOCD IN RCRD: CPT | Mod: CPTII,95,,

## 2024-10-30 PROCEDURE — 97110 THERAPEUTIC EXERCISES: CPT

## 2024-10-30 PROCEDURE — 99214 OFFICE O/P EST MOD 30 MIN: CPT | Mod: 95,,,

## 2024-10-30 PROCEDURE — 97112 NEUROMUSCULAR REEDUCATION: CPT

## 2024-10-30 NOTE — LETTER
October 30, 2024      [facility]  [address] [address]  Phone: [phone]       Patient: Val Horvath  YOB: 1960  Date of Visit: 10/30/2024    To Whom It May Concern:    Val Horvath was at [facility] on 10/30/2024.  Please excuse her absence from work f you have any questions or concerns, or if I can be of further assistance, please do not hesitate to contact my office.    Sincerely,    Roby Howe, PT    [phone]  [email or web address]

## 2024-10-30 NOTE — PROGRESS NOTES
Chronic Pain-Tele-Medicine-Established Note (Follow up visit)        The patient location is: Home  The chief complaint leading to consultation is: 22  Visit type: Virtual visit with synchronous audio and video  Total time spent with patient: 20 min  Each patient to whom he or she provides medical services by telemedicine is:  (1) informed of the relationship between the physician and patient and the respective role of any other health care provider with respect to management of the patient; and (2) notified that he or she may decline to receive medical services by telemedicine and may withdraw from such care at any time.     PCP: India Camacho MD    REFERRING PHYSICIAN: No ref. provider found    CHIEF COMPLAINT: lower back pain       Interval History 10/30/2024:  Val Horvath returns to clinic for virtual appointment for follow-up after Left L3/4 TFESI on 10/16/2024. She reports limited pain relief of leg pain. She continues with left-sided lateral thigh pain. She states the pain is worse with walking. She also states she cannot sleep on her left side. Previous in-office physical exam did show bilateral GTB tenderness. She continues tylenol and ice with good benefit. She denies any perceived side effects. She denies recent health changes. She denies recent falls or trauma. She denies new onset fever/night sweats, urinary incontinence, bowel incontinence, significant weight changes, significant motor weakness or changes, or loss of sensations. Her pain today is 8/10.      Original HPI 9/19/2024:  Original HISTORY OF PRESENT ILLNESS: Val Horvath presents to the clinic for the evaluation of the above pain. . Patient was seen by Orthopedic PA Ade Gutierres and was sent to procedure clinic for Left L4/5 and L5/S1 TFESI which she underwent 7/31/24. She had 80% relief of her lower back pain, but this did not relieve her leg pain. Patient reports the back pain is well controlled now. She is most  concerned about shooting pain down L leg to the L knee (does not extend to the foot).     Original Pain Description:  The pain is located in the R anterior thigh and the L buttock radiating down the left lateral thigh. The right leg pain started after a fall at work in July (no litigation involved with work). The pain is described as aching and throbbing. Exacerbating factors: wearing tight clothes and going up stairs. Mitigating factors laying down and rest. Symptoms interfere with daily activity, sleeping, and work. The patient feels like symptoms have been unchanged. Patient denies night fever/night sweats, urinary incontinence, bowel incontinence, significant weight loss, significant motor weakness, and loss of sensations.    Original PAIN SCORES:  Best: Pain is 5  Worst: Pain is 9  Current: Pain is 6        9/19/2024     3:47 PM   Last 3 PDI Scores   Pain Disability Index (PDI) 42           Conservative therapy:  PT: not currently participating   Chiro:  HEP in the past 6 months: patient preforming daily HEP with limited benefit       Treatments / Medications: (Ice/Heat/NSAIDS/APAP/etc):  Tylenol  Ice   Unable to take NSAIDs 2/2 gastric issues       Interventional Pain Procedures: (Previous injections)  7/31/24 - Left L4/5 and L5/S1 TFESI (Direct Referral) - 80% back pain relief  10/16/2024 - Left L3/4 TFESI - limited pain relief of left lateral thigh pain        IMAGING:    MRI LUMBAR SPINE WITHOUT CONTRAST 7/8/24     CLINICAL HISTORY:  Lumbar radiculopathy, symptoms persist with conservative treatment; Radiculopathy, lumbar region     TECHNIQUE:  Multiplanar, multisequence MR images were acquired from the thoracolumbar junction to the sacrum without contrast.     COMPARISON:  XR L-spine 04/24/2024.     FINDINGS:  Alignment: Normal sagittal alignment.  No spondylolisthesis.     Vertebrae: Vertebral body heights are maintained.  No fracture or marrow infiltrative process.     Discs: Degenerative disc  desiccation with mild height loss at L3-L4 and L4-L5.  Trace bone marrow edema/Modic 1 changes along the posterior aspect of the L4 superior endplate.     Cord: Conus terminates at L2 and appears unremarkable.  Cauda equina appears unremarkable.     Degenerative findings:     T12-L1: No spinal canal stenosis or neural foraminal narrowing.     L1-L2: No spinal canal stenosis or neural foraminal narrowing.     L2-L3: No spinal canal stenosis or neural foraminal narrowing.     L3-L4: Circumferential disc bulge causes mass effect on the ventral thecal sac and lateral recesses and likely abuts the bilateral descending L4 nerve root.  Bilateral ligamentum flavum hypertrophy.  Mild spinal canal stenosis.  Mild-to-moderate left neural foraminal narrowing.     L4-L5: Circumferential disc bulge causes mass effect on the ventral thecal sac and lateral recesses and closely approximates the bilateral descending L5 nerve roots.  Right extraforaminal broad-based protrusion closely approximates the right exiting L4 nerve root.  No spinal canal stenosis.  Mild right neural foraminal narrowing.     L5-S1: Posterior central/left subarticular broad-based disc bulge closely approximates the left descending S1 nerve root.  Bilateral facet arthropathy with mild right-sided periarticular soft tissue edema.  No spinal canal stenosis.  Mild left neural foraminal narrowing.     Paraspinal muscles & soft tissues: Paraspinal muscles are unremarkable.  Partially visualized T2 hyperintense lesion within the right hepatic lobe, possibly a small cyst or benign hemangioma.  Few small bilateral simple renal cysts.     Impression:     Multilevel lumbar spondylosis as detailed above.  Mild spinal canal stenosis at L3-L4.  Multilevel mild-to-moderate neural foraminal narrowing.      Past Medical History:   Diagnosis Date    Anxiety     AR (allergic rhinitis) 3/10/2014    Diverticulosis 2010    Family history of breast cancer in sister     Genital herpes      Hiatal hernia 2010    Irritable bowel syndrome     Meningioma 3/10/2014    MRI 2/14    Post herpetic neuralgia 1/21/2014 1/14      Past Surgical History:   Procedure Laterality Date    CARPAL TUNNEL RELEASE Right 1990s    COLONOSCOPY N/A 12/2/2020    Procedure: COLONOSCOPY;  Surgeon: Pieter Gomez MD;  Location: [facility] (4TH FLR);  Service: Endoscopy;  Laterality: N/A;  patient due for colonoscopy in November 2020  prep ins. emailed  and mailed - COVID screening on 11/29/20 [facility]  patient confirmed new arrival time of 0900-BB    ESOPHAGOGASTRODUODENOSCOPY N/A 12/1/2023    Procedure: EGD (ESOPHAGOGASTRODUODENOSCOPY);  Surgeon: Pieter Gomez MD;  Location: [facility] (4TH FLR);  Service: Endoscopy;  Laterality: N/A;  time frame 5-12 weeks  dr. gomez patient  prep instructions given to pt in [facility] and via portal -  pt is hypoglycemic  11/24 precall complete-ml    ESOPHAGOGASTRODUODENOSCOPY N/A 2/26/2024    Procedure: EGD (ESOPHAGOGASTRODUODENOSCOPY);  Surgeon: Pieter Gomez MD;  Location: [facility] (2ND FLR);  Service: Endoscopy;  Laterality: N/A;  Issac pt  prep instructions sent to pt via portal    2/19-precall complete-MS    OOPHORECTOMY Right 1970s    TRANSFORAMINAL EPIDURAL INJECTION OF STEROID Left 7/31/2024    Procedure: LUMBAR TRANSFORAMINAL LEFT L4/5 AND L5/S1 DIRECT REFERRAL;  Surgeon: Cresencio Spievy MD;  Location: [facility] PAIN MGT;  Service: Pain Management;  Laterality: Left;  [phone]    TRANSFORAMINAL EPIDURAL INJECTION OF STEROID Left 10/16/2024    Procedure: LUMBAR TRANSFORAMINAL LEFT L3/4;  Surgeon: Cresencio Spivey MD;  Location: [facility] PAIN MGT;  Service: Pain Management;  Laterality: Left;  [phone]  2 WK F/U DANII     Social History     Socioeconomic History    Marital status: Single    Number of children: 1   Occupational History    Occupation: Works Security     Employer: [employer]   Tobacco Use    Smoking status: Never     Passive  exposure: Never    Smokeless tobacco: Never   Substance and Sexual Activity    Alcohol use: Yes     Comment: rare    Drug use: Never    Sexual activity: Yes     Partners: Male   Social History Narrative    The patient does exercise regularly (bikes QW-BIW).      She is not satisfied with weight.    Rates diet as good.    She does drink at least 1/2 gallon water daily.    She drinks 0 coffee/tea/caffeine-containing soft drinks daily.    Total sleep time at night is 8 hours.    She works 40 hours per week.    She does wear seat belts.    Hobbies include none.     Social Drivers of Health     Financial Resource Strain: Medium Risk (7/24/2024)    Overall Financial Resource Strain (CARDIA)     Difficulty of Paying Living Expenses: Somewhat hard   Food Insecurity: Food Insecurity Present (7/24/2024)    Hunger Vital Sign     Worried About Running Out of Food in the Last Year: Sometimes true     Ran Out of Food in the Last Year: Sometimes true   Physical Activity: Sufficiently Active (7/24/2024)    Exercise Vital Sign     Days of Exercise per Week: 3 days     Minutes of Exercise per Session: 110 min   Stress: No Stress Concern Present (7/24/2024)    [employer] [place] [employer] - Occupational Stress Questionnaire     Feeling of Stress : Only a little   Housing Stability: Unknown (7/24/2024)    Housing Stability Vital Sign     Unable to Pay for Housing in the Last Year: No     Family History   Problem Relation Name Age of Onset    Heart disease Mother  65        MI    Hypertension Mother      Kidney disease Mother      Bladder Cancer Father          bladder    Breast cancer Sister  45    No Known Problems Brother      Cancer Paternal Uncle          colon cancer        Review of patient's allergies indicates:   Allergen Reactions    Codeine Itching    Sulfa (sulfonamide antibiotics) Itching       Current Outpatient Medications   Medication Sig    acyclovir (ZOVIRAX) 400 MG tablet Take 1 tablet (400 mg total) by  mouth 2 (two) times daily. (Patient not taking: Reported on 9/19/2024)    albuterol (ACCUNEB) 1.25 mg/3 mL Nebu Take 3 mLs (1.25 mg total) by nebulization every 6 (six) hours as needed. Rescue (Patient not taking: Reported on 7/25/2024)    albuterol (VENTOLIN HFA) 90 mcg/actuation inhaler Inhale 2 puffs into the lungs every 6 (six) hours as needed for Wheezing. Rescue (Patient not taking: Reported on 7/25/2024)    ALPRAZolam (XANAX) 0.25 MG tablet Take 1 tablet (0.25 mg total) by mouth 2 (two) times daily as needed for Anxiety. (Patient not taking: Reported on 9/19/2024)    cetirizine (ZYRTEC) 10 MG tablet TAKE 1 TABLET BY MOUTH ONCE DAILY (Patient not taking: Reported on 9/19/2024)    cyclobenzaprine (FLEXERIL) 5 MG tablet Take 1 tablet (5 mg total) by mouth nightly as needed for Muscle spasms. (Patient not taking: Reported on 9/19/2024)    fluticasone propionate (FLONASE) 50 mcg/actuation nasal spray 1 Spray(s) Both Nares Daily PRN (Patient not taking: Reported on 9/19/2024)    gabapentin (NEURONTIN) 300 MG capsule TAKE UP TO 6 CAPSULES BY MOUTH EVERY DAY (Patient not taking: Reported on 9/19/2024)    ibuprofen (ADVIL,MOTRIN) 600 MG tablet Take 1 tablet (600 mg total) by mouth every 8 (eight) hours as needed for Pain. (Patient not taking: Reported on 9/19/2024)    mometasone (NASONEX) 50 mcg/actuation nasal spray 1-2 sprays by Nasal route once daily. (Patient not taking: Reported on 9/19/2024)    nebulizers Misc Use as instructed daily prn with nebulizer medication (Patient not taking: Reported on 9/19/2024)    pantoprazole (PROTONIX) 40 MG tablet TAKE 1 TABLET(40 MG) BY MOUTH DAILY    terconazole (TERAZOL 7) 0.4 % Crea Place 1 applicator vaginally every evening. (Patient not taking: Reported on 9/19/2024)     No current facility-administered medications for this visit.           ROS:  GENERAL: No fever. No chills. No fatigue. Denies weight loss. Denies weight gain.  HEENT: Denies headaches. Denies vision change.  Denies eye pain. Denies double vision. Denies ear pain.   CV: Denies chest pain.   PULM: Denies of shortness of breath.  GI: Denies constipation. No diarrhea. No abdominal pain. Denies nausea. Denies vomiting. No blood in stool.  HEME: Denies bleeding problems.  : Denies urgency. No painful urination. No blood in urine.  MS: Denies joint stiffness. Denies joint swelling.  Denies back pain.  SKIN: Denies rash.   NEURO: Denies seizures. No weakness.  PSYCH:  Denies difficulty sleeping. No anxiety. Denies depression. No suicidal thoughts.       VITALS:   There were no vitals filed for this visit.    VIRTUAL PHYSICAL EXAMINATION:    GENERAL APPEARANCE: Well appearing, in no acute distress.  PSYCH:  Mood and affect appropriate.  SKIN: Skin color, texture, turgor normal, no rashes or lesions to visible areas.  HEAD/FACE:  Normocephalic, atraumatic.  PULM: Bilateral chest rise. No apparent respiratory distress.          Prior PHYSICAL EXAM:   GENERAL: Well appearing, in no acute distress, alert and oriented x3.  PSYCH:  Mood and affect appropriate.  SKIN: Skin color, texture, turgor normal, no rashes or lesions.  HEENT:  Normocephalic, atraumatic. Cranial nerves grossly intact.  NECK:   No pain to palpation over the cervical paraspinous muscles.   no pain to palpation over facets.   no pain with neck flexion, extension, or lateral flexion.   Spurling test negative  PULM: No evidence of respiratory difficulty, symmetric chest rise.  GI:  Non-distended  BACK:   Normal range of motion.   No pain to palpation over the spinous processes.   Mild pain to palpation over facet joints.   Negative axial loading test bilateral.   Negative tenderness over bilateral SIJ. Negative thigh and sacral thrust test, Negative FABERE, Ganselin and Yeoman's test.   GTB tenderness bilaterally   EXTREMITIES:   No deformities, edema, or skin discoloration.   Shoulder, hip, and knee provocative maneuvers are negative. No atrophy is noted.    Negative FADIR    Negative for knee limited ROM, negative ant and post drawer sign.  NEURO: Sensation is equal and appropriate bilaterally. Bilateral upper and lower extremity strength is normal and symmetric. Bilateral upper and lower extremity coordination and muscle stretch reflexes are physiologic and symmetric. Plantar response are downgoing. Straight leg raising in the supine position is negative to radicular pain.   GAIT: normal         ASSESSMENT: 64 y.o. year old with low back pain, consistent with:    1. Greater trochanteric bursitis of left hip  Ambulatory referral/consult to Physical/Occupational Therapy      2. Lumbar radiculopathy  Ambulatory referral/consult to Physical/Occupational Therapy      3. Chronic pain syndrome        4. Degeneration of intervertebral disc of lumbar region with discogenic back pain        5. Facet arthropathy, lumbar        6. Facet arthritis of lumbosacral region        7. Lumbar spondylosis                PLAN:    She is s/p L L3/4 TFESI with limited pain relief left lateral hip/thigh pain  Patient Education:  Discussed the importance of physical therapy, activity modification, and a home exercise plan to help with pain and improve health.  MRI previously reviewed and discussed with patient by Dr Spivye: She has noticeable disc bulge's at L3/4, L4/5, and L5/S1 causing neural foraminal narrowing on the L side.   Therapy referral:  New referral to physical therapy today. Provided patient with hip bursitis exercises to perform daily via AVS for today's visit.   Medications:   Patient can continue with pain medications for now per their provider since they are providing benefits, using them appropriately, and without side effects.  She can continue Tylenol 1000 mg up to TID PRN for pain.  Continue ice PRN  She cannot take NSAIDs  I cannot prescribe opioids and we do not recommend opioid pain medications for back or GTB pain.  Interventions: Scheduled patient for in-office  left GTB injection as previous in-office physical exam shows bilateral GTB tenderness to palpation; patient's complaints today are left-sided only.  Counseled patient: regarding the importance of activity modification, constant sleeping habits, and physical therapy.  Return to clinic: 3-4 weeks after above procedure.     The above plan and management options were discussed at length with patient. Patient is in agreement with the above and verbalized understanding.     Kathy Granger NP  10/30/2024

## 2024-10-30 NOTE — PROGRESS NOTES
"[facility]  Physical Therapy Lumbar Evaluation      Name: Val Horvath  Clinic Number: [id number]    Therapy Diagnosis: No diagnosis found.  Physician: Cresencio Spivey MD    Physician Orders: PT Eval and Treat  Medical Diagnosis from Referral: M54.16 (ICD-10-CM) - Lumbar radiculopathy   Evaluation Date: 10/30/2024  Authorization Period Expiration: 12/31/2024  Plan of Care Expiration: 1/8/2025  Reassessment Due: 11/30/2024  Visit # / Visits authorized: 1/1 (pending)  MedX testing visit 2    Time In: 4:30 pm  Time Out: 5:30 pm  Total Billable Time: 55 minutes  INSURANCE and OUTCOMES: Fee for Service with FOTO Outcomes 1/3    Precautions: {bmpprecautions:49500::"standard"}    Pattern of pain determined: ***    Subjective     Date of onset: ***  History of current condition: Val reports ***     Medical History:   Past Medical History:   Diagnosis Date    Anxiety     AR (allergic rhinitis) 3/10/2014    Diverticulosis 2010    Family history of breast cancer in sister     Genital herpes     Hiatal hernia 2010    Irritable bowel syndrome     Meningioma 3/10/2014    MRI 2/14    Post herpetic neuralgia 1/21/2014 1/14        Surgical History:   Val Horvath  has a past surgical history that includes Oophorectomy (Right, 1970s); Carpal tunnel release (Right, 1990s); Colonoscopy (N/A, 12/2/2020); Esophagogastroduodenoscopy (N/A, 12/1/2023); Esophagogastroduodenoscopy (N/A, 2/26/2024); Transforaminal epidural injection of steroid (Left, 7/31/2024); and Transforaminal epidural injection of steroid (Left, 10/16/2024).    Medications:   Val has a current medication list which includes the following prescription(s): acyclovir, albuterol, albuterol, alprazolam, cetirizine, cyclobenzaprine, fluticasone propionate, gabapentin, ibuprofen, mometasone, nebulizers, pantoprazole, and terconazole.    Allergies:   Review of patient's allergies indicates:   Allergen Reactions    " "Codeine Itching    Sulfa (sulfonamide antibiotics) Itching        Imaging: {bmpimagin} ***    Prior Therapy: ***  Prior Treatment: ***  Social History: *** {LIVES WITH:77911}  Occupation: ***  Leisure: ***      Prior Level of Function: ***  Current Level of Function: ***  DME owned/used: ***  Gym Membership: ***    Pain:  Current {0-10:::"0"}/10, worst {0-10:::"0"}/10, best {0-10:::"0"}/10   Location: {RIGHT LEFT BILATERAL:38023} {LOCATION ON BODY:98670}  Description: {Pain Description:39414}  Aggravating Factors: {Causes; Pain:89194}  Easing Factors: {Pain (activities that relieve):18169}  Disturbed Sleep: ***    Pattern of pain questions:  1.  Where is your pain the worst? ***  2.  Is your pain constant or intermittent? ***  3.  Does bending forward make your typical pain worse? ***  4.  Since the start of your back pain, has there been a change in your bowel or bladder? ***  5.  What can't you do now that you use to be able to do? ***    Pts goals: ***    Red Flag Screening:   Cough/Sneeze Strain: {+ OR -:31024}  Bladder/Bowel: {+ OR -:75914}  Falls: {+ OR -:56040}  Night pain: {+ OR -:65130}  Unexplained weight loss: {+ OR -:16109}  General health: ***    Objective      Postural examination/scapula alignment: {AMB OT NEURO POSTURAL EXAM:85092}  Joint integrity: {AMB OT NEURO JOINT INTEGRITY:65957}  Skin integrity:{ohbskin:38218::"WNL"}   Edema: {ohbedema:41609::"None"}  Correction of posture: {Correction of Posture:05368}  Sitting: ***  Standing: ***    MOVEMENT LOSS - Lumbar   Norms ROM Loss Initial   Flexion Fingers touch toes, sacral angle >/= 70 deg, uniform spinal curvature, posterior weight shift  {Movement Loss:64207}   Extension ASIS surpasses toes, spine of scapulae surpasses heels, uniform spinal curve {Movement Loss:57579}   Side glide Right  {Movement Loss:38658}   Side glide Left  {Movement Loss:60883}   Rotation Right PT observes contralateral shoulder {Movement Loss:55966} "   Rotation Left PT observes contralateral shoulder {Movement Loss:07397}     Lower Extremity Strength  Right LE  Left LE    Hip flexion: {AMB PT VESTIBULAR STRENGTH:19982} Hip flexion: {AMB PT VESTIBULAR STRENGTH:87789}   Hip extension:  {AMB PT VESTIBULAR STRENGTH:79662} Hip extension: {AMB PT VESTIBULAR STRENGTH:48014}   Hip abduction: {AMB PT VESTIBULAR STRENGTH:71741} Hip abduction: {AMB PT VESTIBULAR STRENGTH:86362}   Hip adduction:  {AMB PT VESTIBULAR STRENGTH:42406} Hip adduction:  {AMB PT VESTIBULAR STRENGTH:36760}   Hip External Rotation {AMB PT VESTIBULAR STRENGTH:64337} Hip External Rotation {AMB PT VESTIBULAR STRENGTH:80204}   Hip Internal rotation   {AMB PT VESTIBULAR STRENGTH:43690} Hip Internal rotation {AMB PT VESTIBULAR STRENGTH:18924}   Knee Flexion {AMB PT VESTIBULAR STRENGTH:95228} Knee Flexion {AMB PT VESTIBULAR STRENGTH:09539}   Knee Extension {AMB PT VESTIBULAR STRENGTH:31397} Knee Extension {AMB PT VESTIBULAR STRENGTH:63999}   Ankle dorsiflexion: {AMB PT VESTIBULAR STRENGTH:68229} Ankle dorsiflexion: {AMB PT VESTIBULAR STRENGTH:80683}   Ankle plantarflexion: {AMB PT VESTIBULAR STRENGTH:98320} Ankle plantarflexion: {AMB PT VESTIBULAR STRENGTH:11739}     GAIT:  Assistive Device used: {AMB PT KNEE GAIT ASSISTIVE DEVICE:50272}  Level of Assistance: {AMB PT KNEE LEVEL OF ASSISTANCE:58120}  Patient displays the following gait deviations: {AMB PT KNEE DISPLAYS:36286}.     Special Tests:   Test Name  Test Result   Prone Instability Test {+ OR -:63814}   SI Joint Provocation Test {+ OR -:95303}   Straight Leg Raise {+ OR -:94499}   Neural Tension Test {+ OR -:86278}   Crossed Straight Leg Raise {+ OR -:64835}   Walking on toes {Able/Not able:06546}   Walking on heels  {Able/Not able:54958}     NEUROLOGICAL SCREENING:     Sensory deficits: ***    Reflexes:    Left Right   Patella Tendon {AMB PT KNEE DTR'S:83545} {AMB PT KNEE DTR'S:11629}   Achilles Tendon {AMB PT KNEE DTR'S:80223} {AMB PT KNEE  "DTR'S:01681}   Babinski  {+ OR -:67180} {+ OR -:20979}   Clonus {+ OR -:16722} {+ OR -:41432}     REPEATED TEST MOVEMENTS:    Baseline symptoms:  Repeated Flexion in Standing {Repeated Test Movements:42704}   Repeated Extension in Standing {Repeated Test Movements:31075}   Repeated Flexion in lying {Repeated Test Movements:01100}   Repeated Extension in lying  {Repeated Test Movements:68073}       STATIC TESTS and other movements:   Prone lie {Repeated Test Movements:39971}   Prone lie on elbows {Repeated Test Movements:93037}   Sitting slouched  {Repeated Test Movements:86513}   Sitting erect {Repeated Test Movements:21306}   Standing slouched {Repeated Test Movements:90129}   Standing erect  {Repeated Test Movements:84744}   Lying prone in extension  {Repeated Test Movements:79163}   Long sitting   {Repeated Test Movements:24208}   Sustained flexion {Repeated Test Movements:76406}   Sustained prone using mat {Repeated Test Movements:46584}     {lumbar testin}    {OUTCOMES SELECTION:73860:::1}    Treatment     Total Treatment time separate from Evaluation: *** minutes    Val received therapeutic exercises to develop/improve posture, lumbar ROM, strength, and muscular endurance for *** minutes including the following exercises:     ***    Written Home Exercises Provided: {Blank single:32422::"yes","Patient instructed to cont prior HEP"}.    HEP AS FOLLOWS:  ***    Exercises were reviewed and Val was able to demonstrate them prior to the end of the session. Val demonstrated {Desc; good/fair/poor:61804} understanding of the education provided.     See EMR under {Blank single:05426::"Media","Patient Instructions"} for exercises provided {Blank single:65333::"10/30/2024","prior visit"}.    Val received the following manual therapy techniques: {AMB PT PROGRESS MANUAL THERAPY:70976} were applied to the: *** for *** minutes.     MedX Testing:  {medxneurocharge:75004}    ***hbt    Therapeutic " Education/Activity provided for *** minutes:   - Patient was given an [facility] Visit 1 handout which discusses the following:  - what to expect in therapy  - an overview of the program, including health coaching and wellness  - importance of spinal hygiene, proper posture, lifting mechanics, sleep quality, and nutrition/hydration   - Bernadette roll trialed, recommended, and purchase information was provided.  - Patient received a handout regarding anticipated muscular soreness following the isometric test and strategies for management were reviewed with patient including stretching, using ice and scheduled rest.   - Patient received verbal education on the following:   - Healthy Back program   - purpose of the isometric test  - safe progression of lumbar strengthening, wellness approach, and systemic strengthening.   - safe usage of MedX machine and testing protocols.    Val received cold pack for *** minutes to *** in [place].    Assessment     Val is a 64 y.o. female referred to [facility] with a medical diagnosis of ***. Pt presents with ***    Pain Pattern: ***       Pt prognosis is {REHAB PROGNOSIS OHS:09646}.     Pt will benefit from skilled outpatient Physical Therapy to address the deficits stated above and in the chart below, to provide pt/family education, and to maximize pt's level of independence. Based on the above history and physical examination an active physical therapy program is recommended.      Plan of care discussed with patient: {YES:80559}  Pt's spiritual, cultural and educational needs considered and patient is agreeable to the plan of care and goals as stated below:     Anticipated Barriers for therapy: ***    PT Evaluation Completed? {YES:68042}    Medical necessity is demonstrated by the following problem list:    History  Co-morbidities and personal factors that may impact the plan of care [] LOW: no personal factors / co-morbidities  [] MODERATE: 1-2 personal  factors / co-morbidities  [] HIGH: 3+ personal factors / co-morbidities    Moderate / High Support Documentation:   Co-morbidities affecting plan of care: ***    Personal Factors:   {Personal Factors:73754}     Examination  Body Structures and Functions, activity limitations and participation restrictions that may impact the plan of care [] LOW: addressing 1-2 elements  [] MODERATE: 3+ elements  [] HIGH: 4+ elements (please support below)    Moderate / High Support Documentation:     Clinical Presentation [] LOW: stable  [] MODERATE: Evolving  [] HIGH: Unstable     Decision Making/ Complexity Score: {Desc; low/moderate/high:508173}         GOALS: Pt is in agreement with the following goals.    Short term goals:  6 weeks or 10 visits   - Pt will demonstrate increased lumbar MedX ROM by at least *** degrees from the initial ROM value with improvements noted in functional ROM and ability to perform ADLs. Appropriate and Ongoing  - Pt will demonstrate increased MedX average isometric strength value by ***% from initial test resulting in improved ability to perform bending, lifting, and carrying activities safely, confidently. Appropriate and Ongoing  - Pt will report a reduction in worst pain score by 1-2 points for improved tolerance for ***. Appropriate and Ongoing  - Pt able to perform HEP correctly with minimal cueing or supervision from therapist to encourage independent management of symptoms. Appropriate and Ongoing    Long term goals: 10 weeks or 20 visits   - Pt will demonstrate increased lumbar MedX ROM by at least *** degrees from initial ROM value, resulting in improved ability to perform functional forward bending while standing and sitting. Appropriate and Ongoing  - Pt will demonstrate increased MedX average isometric strength value by ***% from initial test resulting in improved ability to perform bending, lifting, and carrying activities safely and confidently. Appropriate and Ongoing  - Pt to  demonstrate ability to independently control and reduce their pain through posture positioning and mechanical movements throughout a typical day. Appropriate and Ongoing  - Pt will demonstrate reduced pain and improved functional outcomes as reported on the {[facility]:67409}. Appropriate and Ongoing  - Pt will demonstrate independence with the HEP at discharge. Appropriate and Ongoing  - ***(patient goal)*** Appropriate and Ongoing    Plan     Outpatient physical therapy 2x week for 10 weeks or 20 visits to include the following:   - Patient education  - Therapeutic exercise  - Manual therapy  - Performance testing   - Neuromuscular Re-education  - Therapeutic activity   - Modalities    Pt may be seen by PTA as part of the rehabilitation team.     Therapist: Roby Howe, PT  10/30/2024

## 2024-11-01 ENCOUNTER — TELEPHONE (OUTPATIENT)
Dept: INTERNAL MEDICINE | Facility: CLINIC | Age: 64
End: 2024-11-01
Payer: COMMERCIAL

## 2024-11-01 NOTE — TELEPHONE ENCOUNTER
Contact:  [phone]  Pt calling to check the status of the message that was sent on 10/29/24.  Pt would like a call back from [place]      Please call

## 2024-11-01 NOTE — TELEPHONE ENCOUNTER
Spoke to patient and she was informed that referral was enter and she should get a call from our referral team to schedule

## 2024-11-04 ENCOUNTER — TELEPHONE (OUTPATIENT)
Dept: OPTOMETRY | Facility: CLINIC | Age: 64
End: 2024-11-04
Payer: COMMERCIAL
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
## 2024-11-11 ENCOUNTER — OFFICE VISIT (OUTPATIENT)
Dept: OBSTETRICS AND GYNECOLOGY | Facility: CLINIC | Age: 64
End: 2024-11-11
Payer: COMMERCIAL

## 2024-11-11 VITALS
HEIGHT: 61 IN | BODY MASS INDEX: 29.43 KG/M2 | DIASTOLIC BLOOD PRESSURE: 75 MMHG | SYSTOLIC BLOOD PRESSURE: 120 MMHG | WEIGHT: 155.88 LBS

## 2024-11-11 DIAGNOSIS — B37.31 CANDIDA VAGINITIS: ICD-10-CM

## 2024-11-11 DIAGNOSIS — Z01.419 WELL WOMAN EXAM WITH ROUTINE GYNECOLOGICAL EXAM: Primary | ICD-10-CM

## 2024-11-11 PROCEDURE — 3078F DIAST BP <80 MM HG: CPT | Mod: CPTII,S$GLB,, | Performed by: STUDENT IN AN ORGANIZED HEALTH CARE EDUCATION/TRAINING PROGRAM

## 2024-11-11 PROCEDURE — 3008F BODY MASS INDEX DOCD: CPT | Mod: CPTII,S$GLB,, | Performed by: STUDENT IN AN ORGANIZED HEALTH CARE EDUCATION/TRAINING PROGRAM

## 2024-11-11 PROCEDURE — 99999 PR PBB SHADOW E&M-EST. PATIENT-LVL III: CPT | Mod: PBBFAC,,, | Performed by: STUDENT IN AN ORGANIZED HEALTH CARE EDUCATION/TRAINING PROGRAM

## 2024-11-11 PROCEDURE — 1159F MED LIST DOCD IN RCRD: CPT | Mod: CPTII,S$GLB,, | Performed by: STUDENT IN AN ORGANIZED HEALTH CARE EDUCATION/TRAINING PROGRAM

## 2024-11-11 PROCEDURE — 99396 PREV VISIT EST AGE 40-64: CPT | Mod: S$GLB,,, | Performed by: STUDENT IN AN ORGANIZED HEALTH CARE EDUCATION/TRAINING PROGRAM

## 2024-11-11 PROCEDURE — 3044F HG A1C LEVEL LT 7.0%: CPT | Mod: CPTII,S$GLB,, | Performed by: STUDENT IN AN ORGANIZED HEALTH CARE EDUCATION/TRAINING PROGRAM

## 2024-11-11 PROCEDURE — 3074F SYST BP LT 130 MM HG: CPT | Mod: CPTII,S$GLB,, | Performed by: STUDENT IN AN ORGANIZED HEALTH CARE EDUCATION/TRAINING PROGRAM

## 2024-11-11 RX ORDER — FLUCONAZOLE 150 MG/1
150 TABLET ORAL DAILY
Qty: 1 TABLET | Refills: 0 | Status: SHIPPED | OUTPATIENT
Start: 2024-11-11 | End: 2024-11-12

## 2024-11-11 NOTE — PROGRESS NOTES
History & Physical  Gynecology      SUBJECTIVE:     Chief Complaint: Well Woman       History of Present Illness:  Annual Exam-Postmenopausal  Patient presents for annual exam. She has complaints of vulvar irritation for the past few weeks. Endorses itching of vulva. She shaves every two weeks. She is using Vagisil, anti-itch, on vulva with some relief. Denies vaginal discharge.  Patient denies post-menopausal vaginal bleeding.     She is sexually active. She has never taken hormone replacement therapy.  She participates in regular exercise: yes - walking, riding bike. She reports occasionally vaping.    GYN screening history:  last pap smear NILM/HPV neg 09/2023  Mammogram history: 08/2024 BIRADS 1  Colonoscopy history: 12/2020 - normal, repeat in 10 years  Dexa history: n/a    FH:   Breast cancer: sister - diagnosed in 40s  Colon cancer: denies  Ovarian cancer: denies    Review of patient's allergies indicates:   Allergen Reactions    Codeine Itching    Sulfa (sulfonamide antibiotics) Itching       Past Medical History:   Diagnosis Date    Anxiety     AR (allergic rhinitis) 3/10/2014    Diverticulosis 2010    Family history of breast cancer in sister     Genital herpes     Hiatal hernia 2010    Irritable bowel syndrome     Meningioma 3/10/2014    MRI 2/14    Post herpetic neuralgia 1/21/2014 1/14      Past Surgical History:   Procedure Laterality Date    CARPAL TUNNEL RELEASE Right 1990s    COLONOSCOPY N/A 12/2/2020    Procedure: COLONOSCOPY;  Surgeon: Pieter Davenport MD;  Location: [facility] [address]);  Service: Endoscopy;  Laterality: N/A;  patient due for colonoscopy in November 2020  prep ins. emailed  and mailed - COVID screening on 11/29/20 [facility]  patient confirmed new arrival time of 0900-BB    ESOPHAGOGASTRODUODENOSCOPY N/A 12/1/2023    Procedure: EGD (ESOPHAGOGASTRODUODENOSCOPY);  Surgeon: Pieter Davenport MD;  [facility]  Service: Endoscopy;  Laterality: N/A;  time  frame 5-12 weeks  dr. gomez patient  prep instructions given to pt in clininc and via portal -  pt is hypoglycemic   precall complete-ml    ESOPHAGOGASTRODUODENOSCOPY N/A 2024    Procedure: EGD (ESOPHAGOGASTRODUODENOSCOPY);  Surgeon: Pieter Gomez MD;  Location: [facility] (2ND FLR);  Service: Endoscopy;  Laterality: N/A;  Issac pt  prep instructions sent to pt via portal    -precall complete-MS    OOPHORECTOMY Right 1970s    TRANSFORAMINAL EPIDURAL INJECTION OF STEROID Left 2024    Procedure: LUMBAR TRANSFORAMINAL LEFT L4/5 AND L5/S1 DIRECT REFERRAL;  Surgeon: Cresencio Spivey MD;  Location: [facility];  Service: Pain Management;  Laterality: Left;  [phone]    TRANSFORAMINAL EPIDURAL INJECTION OF STEROID Left 10/16/2024    Procedure: LUMBAR TRANSFORAMINAL LEFT L3/4;  Surgeon: Cresencio Spivey MD;  Location: [facility];  Service: Pain Management;  Laterality: Left;  [phone]  2 WK F/U DANII     OB History          1    Para   1    Term   1            AB        Living   1         SAB        IAB        Ectopic        Multiple        Live Births   1           Obstetric Comments   Menarche age 13.  LMP age 50.  First child born age 21.  History of abnormal PAP smear: NO.  History of abnormal mammogram: NO.  History of sexually transmitted disease:  YES: Herpes.                  Family History   Problem Relation Name Age of Onset    Heart disease Mother  65        MI    Hypertension Mother      Kidney disease Mother      Bladder Cancer Father          bladder    Breast cancer Sister  45    No Known Problems Brother      Cancer Paternal Uncle          colon cancer      Social History     Tobacco Use    Smoking status: Never     Passive exposure: Never    Smokeless tobacco: Never   Substance Use Topics    Alcohol use: Yes     Comment: rare    Drug use: Never       Current Outpatient Medications   Medication Sig    acyclovir (ZOVIRAX) 400 MG tablet Take 1 tablet (400  mg total) by mouth 2 (two) times daily.    ALPRAZolam (XANAX) 0.25 MG tablet Take 1 tablet (0.25 mg total) by mouth 2 (two) times daily as needed for Anxiety.    cetirizine (ZYRTEC) 10 MG tablet TAKE 1 TABLET BY MOUTH ONCE DAILY    cyclobenzaprine (FLEXERIL) 5 MG tablet Take 1 tablet (5 mg total) by mouth nightly as needed for Muscle spasms.    fluticasone propionate (FLONASE) 50 mcg/actuation nasal spray 1 Spray(s) Both Nares Daily PRN    gabapentin (NEURONTIN) 300 MG capsule TAKE UP TO 6 CAPSULES BY MOUTH EVERY DAY    mometasone (NASONEX) 50 mcg/actuation nasal spray 1-2 sprays by Nasal route once daily.    pantoprazole (PROTONIX) 40 MG tablet TAKE 1 TABLET(40 MG) BY MOUTH DAILY    albuterol (ACCUNEB) 1.25 mg/3 mL Nebu Take 3 mLs (1.25 mg total) by nebulization every 6 (six) hours as needed. Rescue (Patient not taking: Reported on 11/11/2024)    albuterol (VENTOLIN HFA) 90 mcg/actuation inhaler Inhale 2 puffs into the lungs every 6 (six) hours as needed for Wheezing. Rescue (Patient not taking: Reported on 11/11/2024)    fluconazole (DIFLUCAN) 150 MG Tab Take 1 tablet (150 mg total) by mouth once daily. for 1 day    ibuprofen (ADVIL,MOTRIN) 600 MG tablet Take 1 tablet (600 mg total) by mouth every 8 (eight) hours as needed for Pain. (Patient not taking: Reported on 11/11/2024)    nebulizers Misc Use as instructed daily prn with nebulizer medication (Patient not taking: Reported on 11/11/2024)    terconazole (TERAZOL 7) 0.4 % Crea Place 1 applicator vaginally every evening. (Patient not taking: Reported on 11/11/2024)     No current facility-administered medications for this visit.       Review of Systems:  Review of Systems   Constitutional:  Negative for chills and fever.   Respiratory:  Negative for shortness of breath.    Cardiovascular:  Negative for chest pain.   Gastrointestinal:  Negative for abdominal pain, nausea and vomiting.   Endocrine: Negative for hot flashes.   Genitourinary:  Negative for genital  sores, vaginal discharge and postmenopausal bleeding.        Vulvar irritation   Integumentary:  Negative for breast mass, nipple discharge and breast skin changes.   Neurological:  Negative for headaches.   Hematological:  Does not bruise/bleed easily.   Psychiatric/Behavioral:  Negative for depression.    Breast: Negative for mass, mastodynia, nipple discharge and skin changes       OBJECTIVE:     Physical Exam:  Physical Exam  Constitutional:       Appearance: Normal appearance.   HENT:      Head: Normocephalic and atraumatic.   Eyes:      Conjunctiva/sclera: Conjunctivae normal.      Pupils: Pupils are equal, round, and reactive to light.   Cardiovascular:      Rate and Rhythm: Normal rate and regular rhythm.   Pulmonary:      Effort: Pulmonary effort is normal. No respiratory distress.   Chest:   Breasts:     Right: No inverted nipple, mass, nipple discharge, skin change or tenderness.      Left: No inverted nipple, mass, nipple discharge, skin change or tenderness.   Abdominal:      General: Abdomen is flat. There is no distension.      Palpations: Abdomen is soft.      Tenderness: There is no abdominal tenderness.   Genitourinary:     General: Normal vulva.      Labia:         Right: No rash, tenderness or lesion.         Left: No rash, tenderness or lesion.       Vagina: Vaginal discharge (thin, white discharge) present. No tenderness or bleeding.      Cervix: No cervical motion tenderness or friability.      Uterus: Not enlarged and not tender.       Adnexa:         Right: No mass, tenderness or fullness.          Left: No mass, tenderness or fullness.     Musculoskeletal:         General: Normal range of motion.      Cervical back: Normal range of motion.   Neurological:      Mental Status: She is alert.   Psychiatric:         Mood and Affect: Mood normal.         Thought Content: Thought content normal.         ASSESSMENT:       ICD-10-CM ICD-9-CM    1. Well woman exam with routine gynecological exam   Z01.419 V72.31       2. Candida vaginitis  B37.31 112.1 fluconazole (DIFLUCAN) 150 MG Tab             Plan:      Val was seen today for well woman.    Diagnoses and all orders for this visit:    Well woman exam with routine gynecological exam    Candida vaginitis  -     fluconazole (DIFLUCAN) 150 MG Tab; Take 1 tablet (150 mg total) by mouth once daily. for 1 day        No orders of the defined types were placed in this encounter.      Well Woman:   - Pap smear: up to date  - Mammogram: up to date  - Colonoscopy: up to date  - Dexa: at age 65  - Immunizations: per PCP  - Labs: per PCP  - Exercise recommended  - Tobacco Cessation counseling provided    Follow up in one year for annual, or prn.    Cesar Smith

## 2024-11-14 ENCOUNTER — CLINICAL SUPPORT (OUTPATIENT)
Dept: REHABILITATION | Facility: OTHER | Age: 64
End: 2024-11-14
Attending: ANESTHESIOLOGY
Payer: COMMERCIAL

## 2024-11-14 DIAGNOSIS — M62.81 WEAKNESS OF TRUNK MUSCULATURE: Primary | ICD-10-CM

## 2024-11-14 DIAGNOSIS — M70.62 GREATER TROCHANTERIC BURSITIS OF LEFT HIP: ICD-10-CM

## 2024-11-14 DIAGNOSIS — M54.16 LUMBAR RADICULOPATHY: ICD-10-CM

## 2024-11-14 DIAGNOSIS — M53.86 DECREASED RANGE OF MOTION OF LUMBAR SPINE: ICD-10-CM

## 2024-11-14 PROCEDURE — 97163 PT EVAL HIGH COMPLEX 45 MIN: CPT | Mod: PN

## 2024-11-14 PROCEDURE — 97530 THERAPEUTIC ACTIVITIES: CPT | Mod: PN

## 2024-11-14 PROCEDURE — 97140 MANUAL THERAPY 1/> REGIONS: CPT | Mod: PN

## 2024-11-14 NOTE — PATIENT INSTRUCTIONS
"Access Code: [email or web address]  URL: [email or web address]  Date: 11/14/2024  Prepared by: Alonso Griffith    Exercises  - Quadruped Rocking Backward  - 1-2 x daily - 5-7 x weekly - 3 sets - 10 reps - 3-5 seconds hold  - Supine Bridge  - 3-4 x daily - 5-7 x weekly - 1-3 sets - 8-10 reps - 3" hold  - Prone Press Up  - 3-4 x daily - 5-7 x weekly - 1-3 sets - 8-10 reps - 3" hold  - Seated Slump Nerve Glide  - 3-4 x daily - 5-7 x weekly - 1-3 sets - 8-10 reps - 3" hold  "

## 2024-11-14 NOTE — PLAN OF CARE
[facility]  Physical Therapy Initial Evaluation    Name: Val ROE [facility] Number: [id number]    Therapy Diagnosis:   Encounter Diagnoses   Name Primary?    Lumbar radiculopathy     Greater trochanteric bursitis of left hip     Weakness of trunk musculature Yes    Decreased range of motion of lumbar spine      Physician: Kathy Granger NP    Physician Orders: PT Eval and Treat   Medical Diagnosis from Referral:  Lumbar radiculopathy [M54.16], Greater trochanteric bursitis of left hip [M70.62]   Evaluation Date: 11/14/2024  Authorization Period Expiration: 12/31/2024  Plan of Care Expiration: 12/31/2024  Visit # / Visits authorized: 1/ 1; future visits pending  FOTO 1st follow up:  FOTO 2nd follow up:    Time In: 7:00 am  Time Out: 7:55 am  Total Billable Time: 55 minutes    Precautions: Standard    Subjective   Date of onset: 1-1.5 years  Per Healthy Back Evaluation Note  History of current condition: Val reports she has pain in the L buttocks and it radiates down the lateral thigh to the knee, usually not past. She describes the pain as stabbing. At first the pain was only in the back, but eventually progressed to the buttock and lateral thigh, but since receiving injections the back pain is better and there is only leg pain. She used to ride her bike a lot, but ever since she stopped she has been having more pain. Aggravating factors include going up stairs, sitting, lying on L side, getting in and out of tub, getting in and out of tub, bending, sweeping, mopping or household chores, and lifting. Going up steps is her biggest problem. Alleviated with stretching and tylenol.    Past Medical History:   Diagnosis Date    Anxiety     AR (allergic rhinitis) 3/10/2014    Diverticulosis 2010    Family history of breast cancer in sister     Genital herpes     Hiatal hernia 2010    Irritable bowel syndrome     Meningioma 3/10/2014    MRI 2/14    Post herpetic neuralgia 1/21/2014     1/14      Val Horvath  has a past surgical history that includes Oophorectomy (Right, 1970s); Carpal tunnel release (Right, 1990s); Colonoscopy (N/A, 12/2/2020); Esophagogastroduodenoscopy (N/A, 12/1/2023); Esophagogastroduodenoscopy (N/A, 2/26/2024); Transforaminal epidural injection of steroid (Left, 7/31/2024); and Transforaminal epidural injection of steroid (Left, 10/16/2024).    Val has a current medication list which includes the following prescription(s): acyclovir, albuterol, albuterol, alprazolam, cetirizine, cyclobenzaprine, fluticasone propionate, gabapentin, ibuprofen, mometasone, nebulizers, pantoprazole, and terconazole.    Review of patient's allergies indicates:   Allergen Reactions    Codeine Itching    Sulfa (sulfonamide antibiotics) Itching        Imaging:   MRI studies:   Multilevel lumbar spondylosis as detailed above.  Mild spinal canal stenosis at L3-L4.  Multilevel mild-to-moderate neural foraminal narrowing.     Prior Therapy: Yes, healthy back  Social History: Lives in [place], [place]   Occupation: [employer]  Prior Level of Function: Independent with all ADL/iADLs   Current Level of Function: Independent with all ADL/iADLs     Pain:  Current 8/10, worst 8/10, best 8/10   Location: bilateral low back and left Lower Extremity    Description: Aching, Dull, Deep, and Shooting  Aggravating Factors: Sitting, Standing, Walking, Night Time, Morning, Extension, Flexing, Lifting, and Getting out of bed/chair  Easing Factors: pain medication, ice, and rest    Pts goals: improve her back pain and Leg pain to climb stairs    Objective      Posture: lumbar lordosis increased in mid lumbar spine and anterior pelvic tilt    Gait: increased lumbosacral rotation with limited trunk movement, decreased hip extension at terminal stance    Functional Movements:   Functional Squat: 5 reps, 15.3 seconds     Deep Tendon Reflex Right  Left    Achilles Reflex (L5/S1) 2+ 2+   Patellar  Reflex (L2-L4) 2+ 2+   Babinski Reflex - -   Clonus - -        Thoraco-Lumbar Range of Motion:    Limitations (%) Pain Observation   Flexion 10%   NO  Hip dominant   Extension 25%   YES  Hinge at L3/4   Left Side Bending 10% YES  Sacral hypomobility   Right Side Bending 10% NO     Left Rotation 20% YES  Left L2/3   Right Rotation 20% NO         Lower Extremity Strength  Right LE  Left LE    Quadriceps: 4/5 Quadriceps: 4-/5   Hamstrings: 4/5 Hamstrings: 4/5   Iliospoas: 4/5 Iliospoas: 3+/5   Hip extension:  3+/5 Hip extension: 3+/5   PGM: 4/5 PGM: 3/5   Hip ER:  4/5 Hip ER: 3/5   Hip IR: 4/5 Hip IR: 4/5     Special Tests:   Observation/Result   Repeated Flexion (-)   Repeated Extension (-)   Sacral Rocking (+) L3   Lumbar Compression Test (+) L2   Lumbar Protective Mechanism (+) delayed   Prone LE Extension (+) lordosis      Sacroiliac Joint Screen  SIJ  Right  Left    Thigh Thrust - -   Distraction - -   Sacral Thrust - -   SIJ Mobility     Flick Test - -   Standing Flexion - -     Hip Screen  HIP Right  Left    SAMY - -   ZHANE - -   Hip Scour - -     Hip Abduction Test:   - Right 1/3  - Left 2/3     Positive finding: inability to control pelvis in the frontal plain.    0 = no signs of poor movement control  1 = minimal loss of control as noted through signs of wobble  2 = moderate loss of control with at least 2 deviations  3 = severe loss of control with at least 3 deviations and requires hand on mat to stabilize    Movement System Impairment Screen:   MSI Observation    Bent Knee Fall Out +   Prone Knee Bend +   Alternating March -   Hand Heel Rock Relief and stretch       Neural Tension Testing:   Slump: (+) and relevant Left Lower Extremity   SLR: (+) Left Lower Extremity     Mobility Testing:  -Shear testing:   (+) L2/L3  -Segmental mobility testing:  Limited L3/L4    Palpation:   -Erector Spinae: tonic  -Multifidi activation: poor    Flexibility: (+)   Ely's test: R = + ; L = -   Hamstrings: R = - ; L =  -   Darrell test: R = - ; L = -     Intake Outcome Measure for FOTO Lumbar Survey    Therapist reviewed FOTO scores for Val Horvath on 11/14/2024.   FOTO documents entered into [employer] - see Media section.    Intake Score: 46%  Predicted Score: 34%         TREATMENT   Treatment Time In: 7:30 am  Treatment Time Out: 7:50 am  Total Treatment time separate from Evaluation: 20 minutes    Val participated in dynamic functional therapeutic activities to improve functional performance for 10  minutes, including:  Bridges 20x's   Prone press ups 20x's  Sciatic Flossing 20x's   Quadruped Rocking 15x's      Val received the following manual therapy techniques: Joint mobilizations were applied to the: Lumbar for 10 minutes, including:  Left L3 HVLA   Left L3 Opening mobilization Grade III    Home Exercises and Patient Education Provided    Education provided re: prognosis, activity modification, goals for therapy, role of therapy for care, exercises/HEP    Written Home Exercises Provided: Yes.  Exercises were reviewed and Val was able to demonstrate them prior to the end of the session.   Pt received a written copy of exercises to perform at home. Val demonstrated good understanding of the education provided.     See EMR under patient instructions for exercises given.   Assessment   Val is a 64 y.o. female referred to outpatient Physical Therapy with a medical diagnosis of lumbar radiculopathy. Pt presents with signs and symptoms consistent with lumbar stenotic changes with underlying adverse neural tension of the L3 nerve root. Lumbar extension syndrome with decreased motor control of the lumbar spine.   Pt prognosis is Good.   Pt will benefit from skilled outpatient Physical Therapy to address the deficits stated above and in the chart below, provide pt/family education, and to maximize pt's level of independence.     Plan of care discussed with patient: Yes  Patient's spiritual, cultural and educational  needs considered and patient is agreeable to the plan of care and goals as stated below:     Anticipated Barriers for therapy: None    Medical Necessity is demonstrated by the following  History  Co-morbidities and personal factors that may impact the plan of care [] LOW: no personal factors / co-morbidities  [] MODERATE: 1-2 personal factors / co-morbidities  [x] HIGH: 3+ personal factors / co-morbidities    Moderate / High Support Documentation:   Anxiety    AR (allergic rhinitis) 3/10/2014   Diverticulosis 2010   Family history of breast cancer in sister    Genital herpes    Hiatal hernia 2010   Irritable bowel syndrome    Meningioma 3/10/2014   MRI 2/14   Post herpetic neuralgia 1/21/2014 1/14         Examination  Body Structures and Functions, activity limitations and participation restrictions that may impact the plan of care [] LOW: addressing 1-2 elements  [] MODERATE: 3+ elements  [x] HIGH: 4+ elements (please support below)    Moderate / High Support Documentation:   ANTT  Lumbar Motor control   Lumbar mobility   Difficulty with ADLs     Clinical Presentation [] LOW: stable  [] MODERATE: Evolving  [x] HIGH: Unstable     Decision Making/ Complexity Score: high       Goals:  Short Term Goals:  4 weeks  1.Report decreased low back pain pain  < / =  5/10  to increase tolerance for ADLs  2. Increase ROM by 10% where limited in order to perform ADLs without difficulty.  3. Increase strength by 1/3 MMT grade in Left gluteus medius  to increase tolerance for ADL and work activities.  4. Pt to tolerate HEP to improve ROM and independence with ADL's    Long Term Goals: 8 weeks  1.Report decreased low back pain < / = 3/10  to increase tolerance for work related tasks such as stairs.   2.Patient goal: improve her low back pain and be able to climb stairs with less difficulty.  3.Increase strength to 4+/5 in bilateral glutes  to increase tolerance for ADL and work activities.  4. Pt will report at 34% score on FOTO   to demonstrate increase in LE function with every day tasks.       Plan   Plan of care Certification: 11/14/2024 to 12/31/2024.    Outpatient Physical Therapy 2 times weekly for 12 weeks to include the following interventions: Cervical/Lumbar Traction, Electrical Stimulation as needed, Gait Training, Manual Therapy, Moist Heat/ Ice, Neuromuscular Re-ed, Orthotic Management and Training, Patient Education, Self Care, Therapeutic Activities, and Therapeutic Exercise, Blood Flow Restriction Training, Trigger Point Dry Needling as needed.      Alonso Griffith PT, DPT  Board Certified Orthopaedic Clinical Specialist

## 2024-11-19 ENCOUNTER — DOCUMENTATION ONLY (OUTPATIENT)
Dept: REHABILITATION | Facility: OTHER | Age: 64
End: 2024-11-19
Payer: COMMERCIAL

## 2024-11-19 NOTE — PROGRESS NOTES
Physical Therapist and Physical Therapist Assistant met face to face to discuss patient's treatment plan and progress towards established goals. Pt will be seen by a physical therapist minimally every 6th visit or every 30 days.    Davi Noguera, PTA  11/19/2024

## 2024-11-20 NOTE — PROGRESS NOTES
[facility]   Physical Therapy Treatment Note     Name: Val ROE [facility] Number: [id number]    Therapy Diagnosis:   Encounter Diagnoses   Name Primary?    Lumbar radiculopathy Yes    Weakness of trunk musculature     Decreased range of motion of lumbar spine      Physician: Cresencio Spivey MD    Visit Date: 11/21/2024    Physician Orders: PT Eval and Treat   Medical Diagnosis from Referral:  Lumbar radiculopathy [M54.16], Greater trochanteric bursitis of left hip [M70.62]   Evaluation Date: 11/14/2024  Authorization Period Expiration: 12/31/2024  Plan of Care Expiration: 12/31/2024  Visit # / Visits authorized: 1/12  FOTO 1st follow up:  FOTO 2nd follow up:    Precautions: Standard    Time In: 6:35 a  Time Out: 7:31 a  Total Billable Time: 56 minutes    SUBJECTIVE     Pt reports: that she is doing well this morning, felt better after the first visit.   She was compliant with home exercise program.  Response to previous treatment: tolerated well, no issues  Functional change: none today    Prior Level of Function: Independent with all ADL/iADLs   Current Level of Function: Independent with all ADL/iADLs      Pain:  Current 6/10, worst 8/10, best 8/10   Location: bilateral low back and left Lower Extremity    Description: Aching, Dull, Deep, and Shooting  Aggravating Factors: Sitting, Standing, Walking, Night Time, Morning, Extension, Flexing, Lifting, and Getting out of bed/chair  Easing Factors: pain medication, ice, and rest     Pts goals: improve her back pain and Leg pain to climb stairs    OBJECTIVE     Objective Measures updated at progress report unless specified.     Treatment     Val received the treatments listed below:        manual therapy techniques: Joint mobilizations and Myofacial release were applied to the: Lumbar for 00 minutes, including:  Lumbar L3 HVLA   Lumbar L3 Gapping    neuromuscular re-education activities to improve: Kinesthetic, Sense,  "Proprioception, Posture, and Muscle Activation for 23 minutes. The following activities were included:  Recumbent x 8 min L5   LTR on red ball, x 3 min  Bird Dogs 2 x 10 reps   Supine TrA + marching, 20x  Bridging 3 hold x 20  Supine clams, RTB   Prone press ups, 10x10"  Prone leg lifts 2x10 R/L    Val participated in dynamic functional therapeutic activities to improve functional performance for 23 minutes, including:  Sit to Stands 2 x 10 reps   Quadruped Rocking 15x's    Seated silver SB rollouts 15x10" hold  SLR with pilaties ring press, 2x10 R/L  Shuttles Squats 2 cords 3 x 10 reps     Patient Education and Home Exercises     Home Exercises Provided and Patient Education Provided     Education provided:   - Pt education regarding proper technique for there-ex/HEP, as well as rationale for exercise/ POC.     Written Home Exercises Provided: Patient instructed to cont prior HEP. Exercises were reviewed and Val was able to demonstrate them prior to the end of the session.  Val demonstrated good  understanding of the education provided. See EMR under Patient Instructions for exercises provided during therapy sessions    ASSESSMENT   Patient presents to the clinic with mild symptom irritability pre-session and minimal irritability post-session. Manual therapy was performed to lumbar spine to improve joint play and allow for optimal movement during corrective exercises.   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   Patient demonstrated improvements in lumbar mobility within session.   Val Is progressing well towards her goals.   Pt prognosis is Good.      Pt will continue to benefit from skilled outpatient physical therapy to address the deficits listed in the problem list box on initial evaluation, provide pt/family education and to maximize pt's level of independence in the home and community environment.     Pt's spiritual, cultural and educational needs " considered and pt agreeable to plan of care and goals.     Anticipated Barriers for therapy: None    Goals:  Short Term Goals:  4 weeks  1.Report decreased low back pain pain  < / =  5/10  to increase tolerance for ADLs - (progressing, not met)  2. Increase ROM by 10% where limited in order to perform ADLs without difficulty. - (progressing, not met)  3. Increase strength by 1/3 MMT grade in Left gluteus medius  to increase tolerance for ADL and work activities. - (progressing, not met)  4. Pt to tolerate HEP to improve ROM and independence with ADL's - (progressing, not met)      Long Term Goals: 8 weeks  1.Report decreased low back pain < / = 3/10  to increase tolerance for work related tasks such as stairs. - (progressing, not met)  2.Patient goal: improve her low back pain and be able to climb stairs with less difficulty. - (progressing, not met)  3.Increase strength to 4+/5 in bilateral glutes  to increase tolerance for ADL and work activities. - (progressing, not met)  4. Pt will report at 34% score on FOTO  to demonstrate increase in LE function with every day tasks. - (progressing, not met)        PLAN     Plan of care Certification: 11/14/2024 to 12/31/2024.    Focus on improving lumbar ROM as well as LE/core strength with emphasis on ADL performance     Outpatient Physical Therapy 2 times weekly for 12 weeks to include the following interventions: Cervical/Lumbar Traction, Electrical Stimulation as needed, Gait Training, Manual Therapy, Moist Heat/ Ice, Neuromuscular Re-ed, Orthotic Management and Training, Patient Education, Self Care, Therapeutic Activities, and Therapeutic Exercise, Blood Flow Restriction Training, Trigger Point Dry Needling as needed.    Alonso Griffith, PT

## 2024-11-21 ENCOUNTER — CLINICAL SUPPORT (OUTPATIENT)
Dept: REHABILITATION | Facility: OTHER | Age: 64
End: 2024-11-21
Payer: COMMERCIAL

## 2024-11-21 DIAGNOSIS — M53.86 DECREASED RANGE OF MOTION OF LUMBAR SPINE: ICD-10-CM

## 2024-11-21 DIAGNOSIS — M54.16 LUMBAR RADICULOPATHY: Primary | ICD-10-CM

## 2024-11-21 DIAGNOSIS — M62.81 WEAKNESS OF TRUNK MUSCULATURE: ICD-10-CM

## 2024-11-21 PROCEDURE — 97530 THERAPEUTIC ACTIVITIES: CPT | Mod: PN

## 2024-11-21 PROCEDURE — 97140 MANUAL THERAPY 1/> REGIONS: CPT | Mod: PN

## 2024-11-21 PROCEDURE — 97112 NEUROMUSCULAR REEDUCATION: CPT | Mod: PN

## 2024-11-26 ENCOUNTER — CLINICAL SUPPORT (OUTPATIENT)
Dept: REHABILITATION | Facility: OTHER | Age: 64
End: 2024-11-26
Payer: COMMERCIAL

## 2024-11-26 DIAGNOSIS — M62.81 WEAKNESS OF TRUNK MUSCULATURE: ICD-10-CM

## 2024-11-26 DIAGNOSIS — M53.86 DECREASED RANGE OF MOTION OF LUMBAR SPINE: ICD-10-CM

## 2024-11-26 DIAGNOSIS — M54.16 LUMBAR RADICULOPATHY: Primary | ICD-10-CM

## 2024-11-26 PROCEDURE — 97112 NEUROMUSCULAR REEDUCATION: CPT | Mod: PN,CQ

## 2024-11-26 PROCEDURE — 97530 THERAPEUTIC ACTIVITIES: CPT | Mod: PN,CQ

## 2024-11-26 NOTE — PROGRESS NOTES
[facility]   Physical Therapy Treatment Note     Name: Val ROE [facility] Number: [id number]    Therapy Diagnosis:   Encounter Diagnoses   Name Primary?    Lumbar radiculopathy Yes    Weakness of trunk musculature     Decreased range of motion of lumbar spine        Physician: Cresencio Spivey MD    Visit Date: 11/26/2024    Physician Orders: PT Eval and Treat   Medical Diagnosis from Referral:  Lumbar radiculopathy [M54.16], Greater trochanteric bursitis of left hip [M70.62]   Evaluation Date: 11/14/2024  Authorization Period Expiration: 12/31/2024  Plan of Care Expiration: 12/31/2024  Visit # / Visits authorized: 3/12  FOTO 1st follow up:  FOTO 2nd follow up:    Precautions: Standard    Time In: 0625  Time Out: 0718  Total Billable Time: 53 minutes    SUBJECTIVE     Pt reports: she felt better after her last PT session and noticed slightly improvement in her pain. She has to go to work early and therefore wanted to be seen earlier.     She was compliant with home exercise program.  Response to previous treatment: tolerated well, no issues  Functional change: none today    Prior Level of Function: Independent with all ADL/iADLs   Current Level of Function: Independent with all ADL/iADLs      Pain:  Current 6/10, worst 8/10, best 8/10   Location: bilateral low back and left Lower Extremity    Description: Aching, Dull, Deep, and Shooting  Aggravating Factors: Sitting, Standing, Walking, Night Time, Morning, Extension, Flexing, Lifting, and Getting out of bed/chair  Easing Factors: pain medication, ice, and rest     Pts goals: improve her back pain and Leg pain to climb stairs    OBJECTIVE     Objective Measures updated at progress report unless specified.     Treatment     Val received the treatments listed below:        manual therapy techniques: Joint mobilizations and Myofacial release were applied to the: Lumbar for 00 minutes, including:  Lumbar L3 HVLA   Lumbar L3  "Gapping    neuromuscular re-education activities to improve: Kinesthetic, Sense, Proprioception, Posture, and Muscle Activation for 30 minutes. The following activities were included:      Recumbent x 8 min L3  +LTR on red ball, x 3 min  Bird Dogs 2 x 10 reps   Supine TrA + marching, 20x  Bridging 3 hold x 20  Supine clams, orange TB 30x  Prone press ups, 10x10"  Prone leg lifts 2x10 R/L  +Pull down + march RTB 3x10  +Pallof press RTB 3x10    Val participated in dynamic functional therapeutic activities to improve functional performance for 23 minutes, including:    Sit to Stands with pilates ring squeeze 2 x 10 reps   Quadruped Rocking 15x's    Seated silver SB rollouts 15x10" hold  SLR with pilaties ring press, 2x10 R/L  Shuttles Squats 2 cords 3 x 10 reps     Patient Education and Home Exercises     Home Exercises Provided and Patient Education Provided     Education provided:   - Pt education regarding proper technique for there-ex/HEP, as well as rationale for exercise/ POC.     Written Home Exercises Provided: Patient instructed to cont prior HEP. Exercises were reviewed and Val was able to demonstrate them prior to the end of the session.  Val demonstrated good  understanding of the education provided. See EMR under Patient Instructions for exercises provided during therapy sessions    ASSESSMENT   Val tolerated exercise well with minimal complaints of increased pain. Pt was able to complete trunk/back rotational exercises with decreased muscle guarding. Muscle weakness in paraspinals/core musculature were notable during exercises. Pt was able to demonstrate improved core/transverse abdominis activation after cuing. Pt arrived 35 minutes early and requested to be seen earlier due to having to be at work. She was accommodated this visit with /Physical therapist present in gym during entire session. Pt reported feeling well post tx.            Val Is progressing well towards her " goals.   Pt prognosis is Good.      Pt will continue to benefit from skilled outpatient physical therapy to address the deficits listed in the problem list box on initial evaluation, provide pt/family education and to maximize pt's level of independence in the home and community environment.     Pt's spiritual, cultural and educational needs considered and pt agreeable to plan of care and goals.     Anticipated Barriers for therapy: None    Goals:  Short Term Goals:  4 weeks  1.Report decreased low back pain pain  < / =  5/10  to increase tolerance for ADLs - (progressing, not met)  2. Increase ROM by 10% where limited in order to perform ADLs without difficulty. - (progressing, not met)  3. Increase strength by 1/3 MMT grade in Left gluteus medius  to increase tolerance for ADL and work activities. - (progressing, not met)  4. Pt to tolerate HEP to improve ROM and independence with ADL's - (progressing, not met)      Long Term Goals: 8 weeks  1.Report decreased low back pain < / = 3/10  to increase tolerance for work related tasks such as stairs. - (progressing, not met)  2.Patient goal: improve her low back pain and be able to climb stairs with less difficulty. - (progressing, not met)  3.Increase strength to 4+/5 in bilateral glutes  to increase tolerance for ADL and work activities. - (progressing, not met)  4. Pt will report at 34% score on FOTO  to demonstrate increase in LE function with every day tasks. - (progressing, not met)        PLAN     Plan of care Certification: 11/14/2024 to 12/31/2024.    Focus on improving lumbar ROM as well as LE/core strength with emphasis on ADL performance     Outpatient Physical Therapy 2 times weekly for 12 weeks to include the following interventions: Cervical/Lumbar Traction, Electrical Stimulation as needed, Gait Training, Manual Therapy, Moist Heat/ Ice, Neuromuscular Re-ed, Orthotic Management and Training, Patient Education, Self Care, Therapeutic Activities, and  Therapeutic Exercise, Blood Flow Restriction Training, Trigger Point Dry Needling as needed.    Davi Noguera, PTA

## 2024-11-29 ENCOUNTER — OFFICE VISIT (OUTPATIENT)
Dept: OPTOMETRY | Facility: CLINIC | Age: 64
End: 2024-11-29
Payer: COMMERCIAL

## 2024-11-29 DIAGNOSIS — H52.4 HYPEROPIA OF BOTH EYES WITH ASTIGMATISM AND PRESBYOPIA: ICD-10-CM

## 2024-11-29 DIAGNOSIS — H52.203 HYPEROPIA OF BOTH EYES WITH ASTIGMATISM AND PRESBYOPIA: ICD-10-CM

## 2024-11-29 DIAGNOSIS — H00.021 HORDEOLUM INTERNUM OF RIGHT UPPER EYELID: ICD-10-CM

## 2024-11-29 DIAGNOSIS — H52.03 HYPEROPIA OF BOTH EYES WITH ASTIGMATISM AND PRESBYOPIA: ICD-10-CM

## 2024-11-29 DIAGNOSIS — H25.13 CATARACT, NUCLEAR SCLEROTIC, BOTH EYES: Primary | ICD-10-CM

## 2024-11-29 PROCEDURE — 99999 PR PBB SHADOW E&M-EST. PATIENT-LVL III: CPT | Mod: PBBFAC,,, | Performed by: OPTOMETRIST

## 2024-11-29 NOTE — PROGRESS NOTES
HPI    ONDINA: 2 years ago with outside provider   Last DFE: 2 years ago   Chief complaint (CC): 63 yo F presents today for urgent visit/annual eye   exam. Pt has bump on right upper lid. Pt states no eye pain on RUL   currently. Pt states this has been ongoing for about 2 months. Would   occasionally get bigger. Pt previously treated it with erythromycin. Pt   reports it has gone down since. Pt denies any other ocular or visual   complaints today.     Glasses? +  Contacts? -  H/o eye surgery, injections or laser: -  H/o eye injury: -  Known eye conditions? -  Family h/o eye conditions? -  Eye gtts? -    (-) Flashes (-)  Floaters (-) Mucous   (-)  Tearing (+) Itching (-) Burning   (-) Headaches (-) Eye Pain/discomfort (+) Irritation   (-)  Redness (-) Double vision (-) Blurry vision    Diabetic? -  A1c? Lab Results       Component                Value               Date                       LABA1C                   5.7 (H)             03/15/2014                 HGBA1C                   5.9 (H)             07/27/2024            Last edited by Casimiro Loredo, OD on 11/29/2024 10:22 AM.            Assessment /Plan     For exam results, see Encounter Report.      Cataract, nuclear sclerotic, both eyes   - Minimal. Mot visually significant. Monitor.     Hordeolum internum of right upper eyelid  - Improving  - Pt educated on condition. Continue warm compresses BID-TID for 10 minutes each time followed by a gentle lid massage.   - RTC should condition fail to resolve or worsen     Hyperopia of both eyes with astigmatism and presbyopia   - New Spectacle Rx given today.   - RTC 1 year for routine eye exam.

## 2024-12-03 ENCOUNTER — CLINICAL SUPPORT (OUTPATIENT)
Dept: REHABILITATION | Facility: OTHER | Age: 64
End: 2024-12-03
Payer: COMMERCIAL

## 2024-12-03 DIAGNOSIS — M62.81 WEAKNESS OF TRUNK MUSCULATURE: ICD-10-CM

## 2024-12-03 DIAGNOSIS — M54.16 LUMBAR RADICULOPATHY: Primary | ICD-10-CM

## 2024-12-03 DIAGNOSIS — M53.86 DECREASED RANGE OF MOTION OF LUMBAR SPINE: ICD-10-CM

## 2024-12-03 PROCEDURE — 97530 THERAPEUTIC ACTIVITIES: CPT | Mod: PN,CQ

## 2024-12-03 PROCEDURE — 97112 NEUROMUSCULAR REEDUCATION: CPT | Mod: PN,CQ

## 2024-12-03 NOTE — PROGRESS NOTES
[facility]   Physical Therapy Treatment Note     Name: Val ROE [facility] Number: [id number]    Therapy Diagnosis:   Encounter Diagnoses   Name Primary?    Lumbar radiculopathy Yes    Weakness of trunk musculature     Decreased range of motion of lumbar spine          Physician: Cresencio Spivey MD    Visit Date: 12/3/2024    Physician Orders: PT Eval and Treat   Medical Diagnosis from Referral:  Lumbar radiculopathy [M54.16], Greater trochanteric bursitis of left hip [M70.62]   Evaluation Date: 11/14/2024  Authorization Period Expiration: 12/31/2024  Plan of Care Expiration: 12/31/2024  Visit # / Visits authorized: 4/12  FOTO 1st follow up:  FOTO 2nd follow up:    Precautions: Standard    Time In: 0624  Time Out: 0725  Total Billable Time: 61 minutes    SUBJECTIVE     Pt reports: she felt better after her last PT session and noticed slightly improvement in her pain. She has to go to work early and therefore wanted to be seen earlier.     She was compliant with home exercise program.  Response to previous treatment: tolerated well, no issues  Functional change: none today    Prior Level of Function: Independent with all ADL/iADLs   Current Level of Function: Independent with all ADL/iADLs      Pain:  Current 6/10, worst 8/10, best 8/10   Location: bilateral low back and left Lower Extremity    Description: Aching, Dull, Deep, and Shooting  Aggravating Factors: Sitting, Standing, Walking, Night Time, Morning, Extension, Flexing, Lifting, and Getting out of bed/chair  Easing Factors: pain medication, ice, and rest     Pts goals: improve her back pain and Leg pain to climb stairs    OBJECTIVE     Objective Measures updated at progress report unless specified.     Treatment     Val received the treatments listed below:        manual therapy techniques: Joint mobilizations and Myofacial release were applied to the: Lumbar for 00 minutes, including:  Lumbar L3 HVLA   Lumbar L3  "Gapping    neuromuscular re-education activities to improve: Kinesthetic, Sense, Proprioception, Posture, and Muscle Activation for 38 minutes. The following activities were included:      Recumbent x 8 min L3  LTR on red ball, x 3 min  Bird Dogs 2 x 10 reps   Supine TrA + marching, 20x  Bridging 3 hold x 30  Supine clams, orange TB 30x  Prone press ups, 10x10"  Prone leg lifts 2x10 R/L  Pull down + march RTB 3x10  Pallof press RTB 3x10  Shuttles Squats 2 cords 3 x 15 reps     Val participated in dynamic functional therapeutic activities to improve functional performance for 23 minutes, including:    Sit to Stands with pilates ring squeeze 2 x 10 reps   Quadruped Rocking 15x's    Seated silver SB rollouts 15x10" hold  SLR with pilaties ring press, 3x10 R/L      Patient Education and Home Exercises     Home Exercises Provided and Patient Education Provided     Education provided:   - Pt education regarding proper technique for there-ex/HEP, as well as rationale for exercise/ POC.     Written Home Exercises Provided: Patient instructed to cont prior HEP. Exercises were reviewed and Val was able to demonstrate them prior to the end of the session.  Val demonstrated good  understanding of the education provided. See EMR under Patient Instructions for exercises provided during therapy sessions    ASSESSMENT   Val tolerated exercise well this visit. She was able to demonstrate improved TrA activation with cuing and her endurance was slightly improve today. She was able to perform increased reps with bridging exercise today. Pt arrived 34 minutes early and requested to be seen earlier due to having to be at work. She was accommodated this visit with /Physical therapist present in gym during entire session. Pt reported feeling well post tx.            Val Is progressing well towards her goals.   Pt prognosis is Good.      Pt will continue to benefit from skilled outpatient physical therapy to " address the deficits listed in the problem list box on initial evaluation, provide pt/family education and to maximize pt's level of independence in the home and community environment.     Pt's spiritual, cultural and educational needs considered and pt agreeable to plan of care and goals.     Anticipated Barriers for therapy: None    Goals:  Short Term Goals:  4 weeks  1.Report decreased low back pain pain  < / =  5/10  to increase tolerance for ADLs - (progressing, not met)  2. Increase ROM by 10% where limited in order to perform ADLs without difficulty. - (progressing, not met)  3. Increase strength by 1/3 MMT grade in Left gluteus medius  to increase tolerance for ADL and work activities. - (progressing, not met)  4. Pt to tolerate HEP to improve ROM and independence with ADL's - (progressing, not met)      Long Term Goals: 8 weeks  1.Report decreased low back pain < / = 3/10  to increase tolerance for work related tasks such as stairs. - (progressing, not met)  2.Patient goal: improve her low back pain and be able to climb stairs with less difficulty. - (progressing, not met)  3.Increase strength to 4+/5 in bilateral glutes  to increase tolerance for ADL and work activities. - (progressing, not met)  4. Pt will report at 34% score on FOTO  to demonstrate increase in LE function with every day tasks. - (progressing, not met)        PLAN     Plan of care Certification: 11/14/2024 to 12/31/2024.    Focus on improving lumbar ROM as well as LE/core strength with emphasis on ADL performance     Outpatient Physical Therapy 2 times weekly for 12 weeks to include the following interventions: Cervical/Lumbar Traction, Electrical Stimulation as needed, Gait Training, Manual Therapy, Moist Heat/ Ice, Neuromuscular Re-ed, Orthotic Management and Training, Patient Education, Self Care, Therapeutic Activities, and Therapeutic Exercise, Blood Flow Restriction Training, Trigger Point Dry Needling as needed.    Davi Noguera,  PTA

## 2024-12-05 ENCOUNTER — DOCUMENTATION ONLY (OUTPATIENT)
Dept: REHABILITATION | Facility: OTHER | Age: 64
End: 2024-12-05
Payer: COMMERCIAL

## 2024-12-05 NOTE — PROGRESS NOTES
Patient was scheduled for a follow up physical therapy appointment at [facility] location on 12/05/2024 . Patient failed to appear for the appointment without prior notification today.       Davi Noguera, PTA  12/05/2024

## 2024-12-10 ENCOUNTER — CLINICAL SUPPORT (OUTPATIENT)
Dept: REHABILITATION | Facility: OTHER | Age: 64
End: 2024-12-10
Payer: COMMERCIAL

## 2024-12-10 DIAGNOSIS — M53.86 DECREASED RANGE OF MOTION OF LUMBAR SPINE: ICD-10-CM

## 2024-12-10 DIAGNOSIS — M62.81 WEAKNESS OF TRUNK MUSCULATURE: ICD-10-CM

## 2024-12-10 DIAGNOSIS — M54.16 LUMBAR RADICULOPATHY: Primary | ICD-10-CM

## 2024-12-10 PROCEDURE — 97530 THERAPEUTIC ACTIVITIES: CPT | Mod: PN,CQ

## 2024-12-10 PROCEDURE — 97112 NEUROMUSCULAR REEDUCATION: CPT | Mod: PN,CQ

## 2024-12-10 NOTE — PROGRESS NOTES
[facility]   Physical Therapy Treatment Note     Name: Val ROE [facility] Number: [id number]    Therapy Diagnosis:   Encounter Diagnoses   Name Primary?    Lumbar radiculopathy Yes    Weakness of trunk musculature     Decreased range of motion of lumbar spine          Physician: Cresencio Spivey MD    Visit Date: 12/10/2024    Physician Orders: PT Eval and Treat   Medical Diagnosis from Referral:  Lumbar radiculopathy [M54.16], Greater trochanteric bursitis of left hip [M70.62]   Evaluation Date: 11/14/2024  Authorization Period Expiration: 12/31/2024  Plan of Care Expiration: 12/31/2024  Visit # / Visits authorized: 5/12  FOTO 1st follow up: 12/10/24; score 52  FOTO 2nd follow up:    Precautions: Standard    Time In:0628  Time Out: 0730  Total Billable Time: 61 minutes    SUBJECTIVE     Pt reports: Feeling better today. Not having as much leg/low back pain. She is walking better     She was compliant with home exercise program.  Response to previous treatment: tolerated well, no issues  Functional change: walking better    Prior Level of Function: Independent with all ADL/iADLs   Current Level of Function: Independent with all ADL/iADLs      Pain:  Current 6/10, worst 8/10, best 8/10   Location: bilateral low back and left Lower Extremity    Description: Aching, Dull, Deep, and Shooting  Aggravating Factors: Sitting, Standing, Walking, Night Time, Morning, Extension, Flexing, Lifting, and Getting out of bed/chair  Easing Factors: pain medication, ice, and rest     Pts goals: improve her back pain and Leg pain to climb stairs    OBJECTIVE     Objective Measures updated at progress report unless specified.     Treatment     Val received the treatments listed below:        manual therapy techniques: Joint mobilizations and Myofacial release were applied to the: Lumbar for 00 minutes, including:  Lumbar L3 HVLA   Lumbar L3 Gapping    neuromuscular re-education activities to  "improve: Kinesthetic, Sense, Proprioception, Posture, and Muscle Activation for 38 minutes. The following activities were included:      Recumbent x 8 min L3  LTR on red ball, x 3 min  Bird Dogs 2 x 10 reps   Supine TrA + marching, 20x  Bridging 3 hold x 30  Supine clams, red TB 30x  Prone press ups, 10x10"  Prone leg lifts 2x10 R/L  Pull down + march RTB 3x10  Pallof press RTB 3x10  Shuttles Squats 2 cords 3 x 15 reps     Val participated in dynamic functional therapeutic activities to improve functional performance for 23 minutes, including:    Sit to Stands with pilates ring squeeze 3x 10 reps   Quadruped Rocking 15x's    Seated silver SB rollouts 15x10" hold  SLR with pilaties ring press, 3x10 R/L      Patient Education and Home Exercises     Home Exercises Provided and Patient Education Provided     Education provided:   - Pt education regarding proper technique for there-ex/HEP, as well as rationale for exercise/ POC.     Written Home Exercises Provided: Patient instructed to cont prior HEP. Exercises were reviewed and Val was able to demonstrate them prior to the end of the session.  Val demonstrated good  understanding of the education provided. See EMR under Patient Instructions for exercises provided during therapy sessions    ASSESSMENT   Val tolerated session well today. Emphasis was focused on improving endurance and strength with pt demonstrating good training effect at challenge-point. We will continue to progress as tolerated towards goals set forth in POC as functional/strength deficits continue to remain notable at this time.              Val Is progressing well towards her goals.   Pt prognosis is Good.      Pt will continue to benefit from skilled outpatient physical therapy to address the deficits listed in the problem list box on initial evaluation, provide pt/family education and to maximize pt's level of independence in the home and community environment.     Pt's spiritual, " cultural and educational needs considered and pt agreeable to plan of care and goals.     Anticipated Barriers for therapy: None    Goals:  Short Term Goals:  4 weeks  1.Report decreased low back pain pain  < / =  5/10  to increase tolerance for ADLs - (progressing, not met)  2. Increase ROM by 10% where limited in order to perform ADLs without difficulty. - (progressing, not met)  3. Increase strength by 1/3 MMT grade in Left gluteus medius  to increase tolerance for ADL and work activities. - (progressing, not met)  4. Pt to tolerate HEP to improve ROM and independence with ADL's - (progressing, not met)      Long Term Goals: 8 weeks  1.Report decreased low back pain < / = 3/10  to increase tolerance for work related tasks such as stairs. - (progressing, not met)  2.Patient goal: improve her low back pain and be able to climb stairs with less difficulty. - (progressing, not met)  3.Increase strength to 4+/5 in bilateral glutes  to increase tolerance for ADL and work activities. - (progressing, not met)  4. Pt will report at 34% score on FOTO  to demonstrate increase in LE function with every day tasks. - (progressing, not met)        PLAN     Plan of care Certification: 11/14/2024 to 12/31/2024.    Focus on improving lumbar ROM as well as LE/core strength with emphasis on ADL performance     Outpatient Physical Therapy 2 times weekly for 12 weeks to include the following interventions: Cervical/Lumbar Traction, Electrical Stimulation as needed, Gait Training, Manual Therapy, Moist Heat/ Ice, Neuromuscular Re-ed, Orthotic Management and Training, Patient Education, Self Care, Therapeutic Activities, and Therapeutic Exercise, Blood Flow Restriction Training, Trigger Point Dry Needling as needed.    Davi Noguera, PTA

## 2024-12-11 DIAGNOSIS — B02.29 POST HERPETIC NEURALGIA: ICD-10-CM

## 2024-12-11 DIAGNOSIS — B02.29 HZV (HERPES ZOSTER VIRUS) POST HERPETIC NEURALGIA: ICD-10-CM

## 2024-12-11 RX ORDER — GABAPENTIN 300 MG/1
CAPSULE ORAL
Qty: 180 CAPSULE | Refills: 11 | Status: SHIPPED | OUTPATIENT
Start: 2024-12-11

## 2024-12-12 ENCOUNTER — CLINICAL SUPPORT (OUTPATIENT)
Dept: REHABILITATION | Facility: OTHER | Age: 64
End: 2024-12-12
Payer: COMMERCIAL

## 2024-12-12 DIAGNOSIS — M53.86 DECREASED RANGE OF MOTION OF LUMBAR SPINE: ICD-10-CM

## 2024-12-12 DIAGNOSIS — M62.81 WEAKNESS OF TRUNK MUSCULATURE: ICD-10-CM

## 2024-12-12 DIAGNOSIS — M54.16 LUMBAR RADICULOPATHY: Primary | ICD-10-CM

## 2024-12-12 PROCEDURE — 97140 MANUAL THERAPY 1/> REGIONS: CPT | Mod: PN

## 2024-12-12 PROCEDURE — 97112 NEUROMUSCULAR REEDUCATION: CPT | Mod: PN

## 2024-12-12 PROCEDURE — 97530 THERAPEUTIC ACTIVITIES: CPT | Mod: PN

## 2024-12-12 NOTE — PROGRESS NOTES
[facility]   Physical Therapy Treatment Note     Name: Val ROE [facility] Number: [id number]    Therapy Diagnosis:   Encounter Diagnoses   Name Primary?    Lumbar radiculopathy Yes    Weakness of trunk musculature     Decreased range of motion of lumbar spine        Physician: Cresencio Spivey MD    Visit Date: 12/12/2024    Physician Orders: PT Eval and Treat   Medical Diagnosis from Referral:  Lumbar radiculopathy [M54.16], Greater trochanteric bursitis of left hip [M70.62]   Evaluation Date: 11/14/2024  Authorization Period Expiration: 12/31/2024  Plan of Care Expiration: 12/31/2024  Visit # / Visits authorized: 5/12  FOTO 1st follow up: 12/10/24; score 52  FOTO 2nd follow up:    Precautions: Standard    Time In: 6:30 am  Time Out: 7:30 am  Total Billable Time: 60 minutes    SUBJECTIVE     Pt reports: that she is doing better overall. Able to climb stairs more easily and efficiently.     She was compliant with home exercise program.  Response to previous treatment: tolerated well, no issues  Functional change: walking better    Prior Level of Function: Independent with all ADL/iADLs   Current Level of Function: Independent with all ADL/iADLs      Pain:  Current 3/10, worst 6/10, best 3/10   Location: bilateral low back and left Lower Extremity    Description: Aching, Dull, Deep, and Shooting  Aggravating Factors: Sitting, Standing, Walking, Night Time, Morning, Extension, Flexing, Lifting, and Getting out of bed/chair  Easing Factors: pain medication, ice, and rest     Pts goals: improve her back pain and Leg pain to climb stairs    OBJECTIVE     Objective Measures updated at progress report unless specified.     Treatment     Val received the treatments listed below:        manual therapy techniques: Joint mobilizations and Myofacial release were applied to the: Lumbar for 10 minutes, including:  Lumbar L3 HVLA   Lumbar L3 Gapping    neuromuscular re-education activities  "to improve: Kinesthetic, Sense, Proprioception, Posture, and Muscle Activation for 23 minutes. The following activities were included:  Recumbent x 8 min L3  LTR on red ball, x 3 min  Bird Dogs 2 x 10 reps   Supine TrA + marching, 20x  Bridging 3 hold x 30  Supine clams, red TB 30x  Prone press ups, 10x10"  Prone leg lifts 2x10 R/L  Pull down + march RTB 3x10    Val participated in dynamic functional therapeutic activities to improve functional performance for 23 minutes, including:  Sit to Stands with pilates ring squeeze 3x 10 reps   Quadruped Rocking 15x's    Seated silver SB rollouts 15x10" hold  SLR with pilaties ring press, 3x10 R/L  Pallof press RTB 3x10  Shuttles Squats 2 cords 3 x 15 reps     Patient Education and Home Exercises     Home Exercises Provided and Patient Education Provided     Education provided:   - Pt education regarding proper technique for there-ex/HEP, as well as rationale for exercise/ POC.     Written Home Exercises Provided: Patient instructed to cont prior HEP. Exercises were reviewed and Val was able to demonstrate them prior to the end of the session.  Val demonstrated good  understanding of the education provided. See EMR under Patient Instructions for exercises provided during therapy sessions    ASSESSMENT   Val tolerated session well today. Emphasis was focused on improving endurance and strength with pt demonstrating good training effect at challenge-point. We will continue to progress as tolerated towards goals set forth in POC as functional/strength deficits continue to remain notable at this time.              Val Is progressing well towards her goals.   Pt prognosis is Good.      Pt will continue to benefit from skilled outpatient physical therapy to address the deficits listed in the problem list box on initial evaluation, provide pt/family education and to maximize pt's level of independence in the home and community environment.     Pt's spiritual, " cultural and educational needs considered and pt agreeable to plan of care and goals.     Anticipated Barriers for therapy: None    Goals:  Short Term Goals:  4 weeks  1.Report decreased low back pain pain  < / =  5/10  to increase tolerance for ADLs - (progressing, not met)  2. Increase ROM by 10% where limited in order to perform ADLs without difficulty. - (progressing, not met)  3. Increase strength by 1/3 MMT grade in Left gluteus medius  to increase tolerance for ADL and work activities. - (progressing, not met)  4. Pt to tolerate HEP to improve ROM and independence with ADL's - (progressing, not met)      Long Term Goals: 8 weeks  1.Report decreased low back pain < / = 3/10  to increase tolerance for work related tasks such as stairs. - (progressing, not met)  2.Patient goal: improve her low back pain and be able to climb stairs with less difficulty. - (progressing, not met)  3.Increase strength to 4+/5 in bilateral glutes  to increase tolerance for ADL and work activities. - (progressing, not met)  4. Pt will report at 34% score on FOTO  to demonstrate increase in LE function with every day tasks. - (progressing, not met)        PLAN     Plan of care Certification: 11/14/2024 to 12/31/2024.    Focus on improving lumbar ROM as well as LE/core strength with emphasis on ADL performance     Outpatient Physical Therapy 2 times weekly for 12 weeks to include the following interventions: Cervical/Lumbar Traction, Electrical Stimulation as needed, Gait Training, Manual Therapy, Moist Heat/ Ice, Neuromuscular Re-ed, Orthotic Management and Training, Patient Education, Self Care, Therapeutic Activities, and Therapeutic Exercise, Blood Flow Restriction Training, Trigger Point Dry Needling as needed.    Alonso Griffith, PT, DPT

## 2024-12-17 ENCOUNTER — CLINICAL SUPPORT (OUTPATIENT)
Dept: REHABILITATION | Facility: OTHER | Age: 64
End: 2024-12-17
Payer: COMMERCIAL

## 2024-12-17 DIAGNOSIS — M62.81 WEAKNESS OF TRUNK MUSCULATURE: ICD-10-CM

## 2024-12-17 DIAGNOSIS — M53.86 DECREASED RANGE OF MOTION OF LUMBAR SPINE: ICD-10-CM

## 2024-12-17 DIAGNOSIS — M54.16 LUMBAR RADICULOPATHY: Primary | ICD-10-CM

## 2024-12-17 PROCEDURE — 97530 THERAPEUTIC ACTIVITIES: CPT | Mod: PN,CQ

## 2024-12-17 PROCEDURE — 97112 NEUROMUSCULAR REEDUCATION: CPT | Mod: PN,CQ

## 2024-12-17 NOTE — PROGRESS NOTES
[facility]   Physical Therapy Treatment Note     Name: Val ROE [facility] Number: [id number]    Therapy Diagnosis:   Encounter Diagnoses   Name Primary?    Lumbar radiculopathy Yes    Weakness of trunk musculature     Decreased range of motion of lumbar spine        Physician: Cresencio Spivey MD    Visit Date: 12/17/2024    Physician Orders: PT Eval and Treat   Medical Diagnosis from Referral:  Lumbar radiculopathy [M54.16], Greater trochanteric bursitis of left hip [M70.62]   Evaluation Date: 11/14/2024  Authorization Period Expiration: 12/31/2024  Plan of Care Expiration: 12/31/2024  Visit # / Visits authorized: 7/12  FOTO 1st follow up: 12/10/24; score 52  FOTO 2nd follow up:    Precautions: Standard    Time In: 0630  Time Out: 0730  Total Billable Time: 60 minutes    SUBJECTIVE     Pt reports: that she doing pretty good today. No new complaints.     She was compliant with home exercise program.  Response to previous treatment: tolerated well, no issues  Functional change: walking better    Prior Level of Function: Independent with all ADL/iADLs   Current Level of Function: Independent with all ADL/iADLs      Pain:  Current 3/10, worst 6/10, best 3/10   Location: bilateral low back and left Lower Extremity    Description: Aching, Dull, Deep, and Shooting  Aggravating Factors: Sitting, Standing, Walking, Night Time, Morning, Extension, Flexing, Lifting, and Getting out of bed/chair  Easing Factors: pain medication, ice, and rest     Pts goals: improve her back pain and Leg pain to climb stairs    OBJECTIVE     Objective Measures updated at progress report unless specified.     Treatment     Val received the treatments listed below:        manual therapy techniques: Joint mobilizations and Myofacial release were applied to the: Lumbar for 00 minutes, including:  Lumbar L3 HVLA   Lumbar L3 Gapping    neuromuscular re-education activities to improve: Kinesthetic, Sense,  "Proprioception, Posture, and Muscle Activation for 30 minutes. The following activities were included:  Recumbent x 8 min L3  LTR on red ball, x 3 min  Bird Dogs 2 x 10 reps   Supine TrA + marching, 20x  Bridging 3 hold x 30  Supine clams, red TB 30x  Prone press ups, 10x10"  Prone leg lifts 2x10 R/L  Pull down + march RTB 3x10    Val participated in dynamic functional therapeutic activities to improve functional performance for 30 minutes, including:  Sit to Stands with pilates ring squeeze 3x 10 reps   Quadruped Rocking 15x's    Seated silver SB rollouts 15x10" hold  SLR with pilaties ring press, 3x10 R/L  Pallof press RTB 3x10  Shuttles Squats 2 cords 3 x 15 reps     Patient Education and Home Exercises     Home Exercises Provided and Patient Education Provided     Education provided:   - Pt education regarding proper technique for there-ex/HEP, as well as rationale for exercise/ POC.     Written Home Exercises Provided: Patient instructed to cont prior HEP. Exercises were reviewed and Val was able to demonstrate them prior to the end of the session.  Val demonstrated good  understanding of the education provided. See EMR under Patient Instructions for exercises provided during therapy sessions    ASSESSMENT   Val tolerated session well today. She was able to maintain improved trunk control with quadruped exercises with less compensation. Her core/LE strength appear to be improving.      Val Is progressing well towards her goals.   Pt prognosis is Good.      Pt will continue to benefit from skilled outpatient physical therapy to address the deficits listed in the problem list box on initial evaluation, provide pt/family education and to maximize pt's level of independence in the home and community environment.     Pt's spiritual, cultural and educational needs considered and pt agreeable to plan of care and goals.     Anticipated Barriers for therapy: None    Goals:  Short Term Goals:  4 " weeks  1.Report decreased low back pain pain  < / =  5/10  to increase tolerance for ADLs - (progressing, not met)  2. Increase ROM by 10% where limited in order to perform ADLs without difficulty. - (progressing, not met)  3. Increase strength by 1/3 MMT grade in Left gluteus medius  to increase tolerance for ADL and work activities. - (progressing, not met)  4. Pt to tolerate HEP to improve ROM and independence with ADL's - (progressing, not met)      Long Term Goals: 8 weeks  1.Report decreased low back pain < / = 3/10  to increase tolerance for work related tasks such as stairs. - (progressing, not met)  2.Patient goal: improve her low back pain and be able to climb stairs with less difficulty. - (progressing, not met)  3.Increase strength to 4+/5 in bilateral glutes  to increase tolerance for ADL and work activities. - (progressing, not met)  4. Pt will report at 34% score on FOTO  to demonstrate increase in LE function with every day tasks. - (progressing, not met)        PLAN     Plan of care Certification: 11/14/2024 to 12/31/2024.    Focus on improving lumbar ROM as well as LE/core strength with emphasis on ADL performance     Outpatient Physical Therapy 2 times weekly for 12 weeks to include the following interventions: Cervical/Lumbar Traction, Electrical Stimulation as needed, Gait Training, Manual Therapy, Moist Heat/ Ice, Neuromuscular Re-ed, Orthotic Management and Training, Patient Education, Self Care, Therapeutic Activities, and Therapeutic Exercise, Blood Flow Restriction Training, Trigger Point Dry Needling as needed.    Davi Noguera, PTA

## 2024-12-18 NOTE — PROGRESS NOTES
[facility]   Physical Therapy Treatment Note     Name: Val ROE [facility] Number: [id number]    Therapy Diagnosis:   Encounter Diagnoses   Name Primary?    Lumbar radiculopathy Yes    Weakness of trunk musculature     Decreased range of motion of lumbar spine          Physician: Cresencio Spivey MD    Visit Date: 12/19/2024    Physician Orders: PT Eval and Treat   Medical Diagnosis from Referral:  Lumbar radiculopathy [M54.16], Greater trochanteric bursitis of left hip [M70.62]   Evaluation Date: 11/14/2024  Authorization Period Expiration: 12/31/2024  Plan of Care Expiration: 12/31/2024  Visit # / Visits authorized: 7/12  FOTO 1st follow up: 12/10/24; score 52  FOTO 2nd follow up:    Precautions: Standard    Time In: 6:30 am  Time Out: 7:31 am  Total Billable Time: 61 minutes    SUBJECTIVE     Pt reports: that she is doing well. No increase in pain. No new complaints.    She was compliant with home exercise program.  Response to previous treatment: tolerated well, no issues  Functional change: walking better    Prior Level of Function: Independent with all ADL/iADLs   Current Level of Function: Independent with all ADL/iADLs      Pain:  Current 3/10, worst 6/10, best 3/10   Location: bilateral low back and left Lower Extremity    Description: Aching, Dull, Deep, and Shooting  Aggravating Factors: Sitting, Standing, Walking, Night Time, Morning, Extension, Flexing, Lifting, and Getting out of bed/chair  Easing Factors: pain medication, ice, and rest     Pts goals: improve her back pain and Leg pain to climb stairs    OBJECTIVE     Objective Measures updated at progress report unless specified.     Treatment     Val received the treatments listed below:      manual therapy techniques: Joint mobilizations and Myofacial release were applied to the: Lumbar for 00 minutes, including:  Lumbar L3 HVLA   Lumbar L3 Gapping    neuromuscular re-education activities to improve:  "Kinesthetic, Sense, Proprioception, Posture, and Muscle Activation for 23 minutes. The following activities were included:  Recumbent x 10 min L3  LTR on red ball, x 3 min  Bird Dogs 2 x 10 reps   Supine TrA + marching, 20x  Bridging 3 hold x 30  Clams, Red TB 30x  Prone press ups, 10x10"  Prone leg lifts 2x10 R/L  Pull down + march RTB 3x10    Val participated in dynamic functional therapeutic activities to improve functional performance for 38 minutes, including:  Sit to Stands with pilates ring squeeze 3x 10 reps   Quadruped Rocking 15x's    Seated silver SB rollouts 15x10" hold  SLR with pilaties ring press, 3x10 R/L  Pallof press RTB 3x10  Shuttles Squats 2 cords 3 x 15 reps     Patient Education and Home Exercises     Home Exercises Provided and Patient Education Provided     Education provided:   - Pt education regarding proper technique for there-ex/HEP, as well as rationale for exercise/ POC.     Written Home Exercises Provided: Patient instructed to cont prior HEP. Exercises were reviewed and Val was able to demonstrate them prior to the end of the session.  Val demonstrated good  understanding of the education provided. See EMR under Patient Instructions for exercises provided during therapy sessions    ASSESSMENT   Val tolerated session well today. She was able to maintain improved trunk control with quadruped exercises with less compensation. Her core/LE strength appear to be improving.      Val Is progressing well towards her goals.   Pt prognosis is Good.      Pt will continue to benefit from skilled outpatient physical therapy to address the deficits listed in the problem list box on initial evaluation, provide pt/family education and to maximize pt's level of independence in the home and community environment.     Pt's spiritual, cultural and educational needs considered and pt agreeable to plan of care and goals.     Anticipated Barriers for therapy: None    Goals:  Short Term " Goals:  4 weeks  1.Report decreased low back pain pain  < / =  5/10  to increase tolerance for ADLs - (progressing, not met)  2. Increase ROM by 10% where limited in order to perform ADLs without difficulty. - (progressing, not met)  3. Increase strength by 1/3 MMT grade in Left gluteus medius  to increase tolerance for ADL and work activities. - (progressing, not met)  4. Pt to tolerate HEP to improve ROM and independence with ADL's - (progressing, not met)      Long Term Goals: 8 weeks  1.Report decreased low back pain < / = 3/10  to increase tolerance for work related tasks such as stairs. - (progressing, not met)  2.Patient goal: improve her low back pain and be able to climb stairs with less difficulty. - (progressing, not met)  3.Increase strength to 4+/5 in bilateral glutes  to increase tolerance for ADL and work activities. - (progressing, not met)  4. Pt will report at 34% score on FOTO  to demonstrate increase in LE function with every day tasks. - (progressing, not met)        PLAN     Plan of care Certification: 11/14/2024 to 12/31/2024.    Focus on improving lumbar ROM as well as LE/core strength with emphasis on ADL performance     Outpatient Physical Therapy 2 times weekly for 12 weeks to include the following interventions: Cervical/Lumbar Traction, Electrical Stimulation as needed, Gait Training, Manual Therapy, Moist Heat/ Ice, Neuromuscular Re-ed, Orthotic Management and Training, Patient Education, Self Care, Therapeutic Activities, and Therapeutic Exercise, Blood Flow Restriction Training, Trigger Point Dry Needling as needed.    Alonso Griffith, PT, DPT

## 2024-12-19 ENCOUNTER — CLINICAL SUPPORT (OUTPATIENT)
Dept: REHABILITATION | Facility: OTHER | Age: 64
End: 2024-12-19
Payer: COMMERCIAL

## 2024-12-19 DIAGNOSIS — M54.16 LUMBAR RADICULOPATHY: Primary | ICD-10-CM

## 2024-12-19 DIAGNOSIS — M53.86 DECREASED RANGE OF MOTION OF LUMBAR SPINE: ICD-10-CM

## 2024-12-19 DIAGNOSIS — M62.81 WEAKNESS OF TRUNK MUSCULATURE: ICD-10-CM

## 2024-12-19 PROCEDURE — 97530 THERAPEUTIC ACTIVITIES: CPT | Mod: PN

## 2024-12-19 PROCEDURE — 97112 NEUROMUSCULAR REEDUCATION: CPT | Mod: PN

## 2024-12-23 ENCOUNTER — CLINICAL SUPPORT (OUTPATIENT)
Dept: REHABILITATION | Facility: OTHER | Age: 64
End: 2024-12-23
Payer: COMMERCIAL

## 2024-12-23 DIAGNOSIS — M53.86 DECREASED RANGE OF MOTION OF LUMBAR SPINE: ICD-10-CM

## 2024-12-23 DIAGNOSIS — M54.16 LUMBAR RADICULOPATHY: Primary | ICD-10-CM

## 2024-12-23 DIAGNOSIS — M62.81 WEAKNESS OF TRUNK MUSCULATURE: ICD-10-CM

## 2024-12-23 PROCEDURE — 97112 NEUROMUSCULAR REEDUCATION: CPT | Mod: PN

## 2024-12-23 PROCEDURE — 97530 THERAPEUTIC ACTIVITIES: CPT | Mod: PN

## 2024-12-23 NOTE — PROGRESS NOTES
[facility]   Physical Therapy Treatment Note     Name: Val ROE [facility] Number: [id number]    Therapy Diagnosis:   Encounter Diagnoses   Name Primary?    Lumbar radiculopathy Yes    Weakness of trunk musculature     Decreased range of motion of lumbar spine        Physician: Cresencio Spivey MD    Visit Date: 12/23/2024    Physician Orders: PT Eval and Treat   Medical Diagnosis from Referral:  Lumbar radiculopathy [M54.16], Greater trochanteric bursitis of left hip [M70.62]   Evaluation Date: 11/14/2024  Authorization Period Expiration: 12/31/2024  Plan of Care Expiration: 12/31/2024  Visit # / Visits authorized: 8/12  FOTO 1st follow up: 12/10/24; score 52  FOTO 2nd follow up:    Precautions: Standard    Time In: 6:30 am  Time Out: 7:31 am  Total Billable Time: 61 minutes    SUBJECTIVE     Pt reports: that she continues to do well.     She was compliant with home exercise program.  Response to previous treatment: tolerated well, no issues  Functional change: walking better    Prior Level of Function: Independent with all ADL/iADLs   Current Level of Function: Independent with all ADL/iADLs      Pain:  Current 3/10, worst 6/10, best 3/10   Location: bilateral low back and left Lower Extremity    Description: Aching, Dull, Deep, and Shooting  Aggravating Factors: Sitting, Standing, Walking, Night Time, Morning, Extension, Flexing, Lifting, and Getting out of bed/chair  Easing Factors: pain medication, ice, and rest     Pts goals: improve her back pain and Leg pain to climb stairs    OBJECTIVE     Objective Measures updated at progress report unless specified.     Treatment     Val received the treatments listed below:      manual therapy techniques: Joint mobilizations and Myofacial release were applied to the: Lumbar for 00 minutes, including:  Lumbar L3 HVLA   Lumbar L3 Gapping    neuromuscular re-education activities to improve: Kinesthetic, Sense, Proprioception,  "Posture, and Muscle Activation for 23 minutes. The following activities were included:  Recumbent x 10 min L3  LTR on red ball, x 3 min  Bird Dogs 2 x 10 reps   Supine TrA + marching, 20x  Bridging 3 hold x 30  Clams, Red TB 30x  Prone press ups, 10x10"  Prone leg lifts 2x10 R/L  Pull down + march RTB 3x10    Val participated in dynamic functional therapeutic activities to improve functional performance for 38 minutes, including:  Sit to Stands with pilates ring squeeze 3x 10 reps   Quadruped Rocking 15x's    Seated silver SB rollouts 15x10" hold  Lateral Walks Green 2 laps   Pallof press RTB 3x10  Shuttles Squats 2 cords 3 x 15 reps     Patient Education and Home Exercises     Home Exercises Provided and Patient Education Provided     Education provided:   - Pt education regarding proper technique for there-ex/HEP, as well as rationale for exercise/ POC.     Written Home Exercises Provided: Patient instructed to cont prior HEP. Exercises were reviewed and Val was able to demonstrate them prior to the end of the session.  Val demonstrated good  understanding of the education provided. See EMR under Patient Instructions for exercises provided during therapy sessions    ASSESSMENT   Val tolerated session well today.   Patient presents to the clinic with no symptom irritability pre-session and no irritability post-session. Manual therapy was not performed today.   Patient demonstrates improving lumbar spine motor control and strength.   Patient demonstrated improvements in squat and hip hinge within session.     Val Is progressing well towards her goals.   Pt prognosis is Good.      Pt will continue to benefit from skilled outpatient physical therapy to address the deficits listed in the problem list box on initial evaluation, provide pt/family education and to maximize pt's level of independence in the home and community environment.     Pt's spiritual, cultural and educational needs considered and pt " agreeable to plan of care and goals.     Anticipated Barriers for therapy: None    Goals:  Short Term Goals:  4 weeks  1.Report decreased low back pain pain  < / =  5/10  to increase tolerance for ADLs - (progressing, not met)  2. Increase ROM by 10% where limited in order to perform ADLs without difficulty. - (progressing, not met)  3. Increase strength by 1/3 MMT grade in Left gluteus medius  to increase tolerance for ADL and work activities. - (progressing, not met)  4. Pt to tolerate HEP to improve ROM and independence with ADL's - (progressing, not met)      Long Term Goals: 8 weeks  1.Report decreased low back pain < / = 3/10  to increase tolerance for work related tasks such as stairs. - (progressing, not met)  2.Patient goal: improve her low back pain and be able to climb stairs with less difficulty. - (progressing, not met)  3.Increase strength to 4+/5 in bilateral glutes  to increase tolerance for ADL and work activities. - (progressing, not met)  4. Pt will report at 34% score on FOTO  to demonstrate increase in LE function with every day tasks. - (progressing, not met)        PLAN     Plan of care Certification: 11/14/2024 to 12/31/2024.    Focus on improving lumbar ROM as well as LE/core strength with emphasis on ADL performance     Outpatient Physical Therapy 2 times weekly for 12 weeks to include the following interventions: Cervical/Lumbar Traction, Electrical Stimulation as needed, Gait Training, Manual Therapy, Moist Heat/ Ice, Neuromuscular Re-ed, Orthotic Management and Training, Patient Education, Self Care, Therapeutic Activities, and Therapeutic Exercise, Blood Flow Restriction Training, Trigger Point Dry Needling as needed.    Alonso Griffith, PT, DPT

## 2024-12-26 ENCOUNTER — CLINICAL SUPPORT (OUTPATIENT)
Dept: REHABILITATION | Facility: OTHER | Age: 64
End: 2024-12-26
Payer: COMMERCIAL

## 2024-12-26 DIAGNOSIS — M54.16 LUMBAR RADICULOPATHY: Primary | ICD-10-CM

## 2024-12-26 DIAGNOSIS — M53.86 DECREASED RANGE OF MOTION OF LUMBAR SPINE: ICD-10-CM

## 2024-12-26 DIAGNOSIS — M62.81 WEAKNESS OF TRUNK MUSCULATURE: ICD-10-CM

## 2024-12-26 PROCEDURE — 97530 THERAPEUTIC ACTIVITIES: CPT | Mod: PN

## 2024-12-26 PROCEDURE — 97112 NEUROMUSCULAR REEDUCATION: CPT | Mod: PN

## 2024-12-26 NOTE — PROGRESS NOTES
[facility]   Physical Therapy Treatment Note     Name: Val ROE [facility] Number: [id number]    Therapy Diagnosis:   Encounter Diagnoses   Name Primary?    Lumbar radiculopathy Yes    Weakness of trunk musculature     Decreased range of motion of lumbar spine        Physician: Cresencio Spivey MD    Visit Date: 12/26/2024    Physician Orders: PT Eval and Treat   Medical Diagnosis from Referral:  Lumbar radiculopathy [M54.16], Greater trochanteric bursitis of left hip [M70.62]   Evaluation Date: 11/14/2024  Authorization Period Expiration: 12/31/2024  Plan of Care Expiration: 12/31/2024  Visit # / Visits authorized: 8/12  FOTO 1st follow up: 12/10/24; score 52  FOTO 2nd follow up:    Precautions: Standard    Time In: 6:30 am  Time Out: 7:31 am  Total Billable Time: 61 minutes    SUBJECTIVE     Pt reports: that she continues to do well. No new complaints.     She was compliant with home exercise program.  Response to previous treatment: tolerated well, no issues  Functional change: walking better    Prior Level of Function: Independent with all ADL/iADLs   Current Level of Function: Independent with all ADL/iADLs      Pain:  Current 3/10, worst 6/10, best 3/10   Location: bilateral low back and left Lower Extremity    Description: Aching, Dull, Deep, and Shooting  Aggravating Factors: Sitting, Standing, Walking, Night Time, Morning, Extension, Flexing, Lifting, and Getting out of bed/chair  Easing Factors: pain medication, ice, and rest     Pts goals: improve her back pain and Leg pain to climb stairs    OBJECTIVE     Objective Measures updated at progress report unless specified.     Treatment     Val received the treatments listed below:      manual therapy techniques: Joint mobilizations and Myofacial release were applied to the: Lumbar for 00 minutes, including:  Lumbar L3 HVLA   Lumbar L3 Gapping    neuromuscular re-education activities to improve: Kinesthetic, Sense,  "Proprioception, Posture, and Muscle Activation for 23 minutes. The following activities were included:  Recumbent x 10 min L3  LTR on red ball, x 3 min  Bird Dogs 2 x 10 reps   Supine TrA + marching, 20x  Bridging 3 hold x 30  Clams, Red TB 30x  Prone press ups, 10x10"  Pull down + march RTB 3x10    Val participated in dynamic functional therapeutic activities to improve functional performance for 38 minutes, including:  Sit to Stands with pilates ring squeeze 3x 10 reps   Quadruped Rocking 15x's    Seated silver SB rollouts 15x10" hold  Lateral Walks Green 2 laps   Pallof press RTB 3x10  Sled Push 2 laps 45#   Shuttles Squats 2 cords 3 x 15 reps     Patient Education and Home Exercises     Home Exercises Provided and Patient Education Provided     Education provided:   - Pt education regarding proper technique for there-ex/HEP, as well as rationale for exercise/ POC.     Written Home Exercises Provided: Patient instructed to cont prior HEP. Exercises were reviewed and Val was able to demonstrate them prior to the end of the session.  Val demonstrated good  understanding of the education provided. See EMR under Patient Instructions for exercises provided during therapy sessions    ASSESSMENT   Val tolerated session well today.   Patient presents to the clinic with no symptom irritability pre-session and no irritability post-session. Manual therapy was not performed today.   Patient demonstrates improving lumbar spine motor control and strength.   Patient demonstrated improvements in squat and hip hinge within session.     Val Is progressing well towards her goals.   Pt prognosis is Good.      Pt will continue to benefit from skilled outpatient physical therapy to address the deficits listed in the problem list box on initial evaluation, provide pt/family education and to maximize pt's level of independence in the home and community environment.     Pt's spiritual, cultural and educational needs " considered and pt agreeable to plan of care and goals.     Anticipated Barriers for therapy: None    Goals:  Short Term Goals:  4 weeks  1.Report decreased low back pain pain  < / =  5/10  to increase tolerance for ADLs - (progressing, not met)  2. Increase ROM by 10% where limited in order to perform ADLs without difficulty. - (progressing, not met)  3. Increase strength by 1/3 MMT grade in Left gluteus medius  to increase tolerance for ADL and work activities. - (progressing, not met)  4. Pt to tolerate HEP to improve ROM and independence with ADL's - (progressing, not met)      Long Term Goals: 8 weeks  1.Report decreased low back pain < / = 3/10  to increase tolerance for work related tasks such as stairs. - (progressing, not met)  2.Patient goal: improve her low back pain and be able to climb stairs with less difficulty. - (progressing, not met)  3.Increase strength to 4+/5 in bilateral glutes  to increase tolerance for ADL and work activities. - (progressing, not met)  4. Pt will report at 34% score on FOTO  to demonstrate increase in LE function with every day tasks. - (progressing, not met)        PLAN     Plan of care Certification: 11/14/2024 to 12/31/2024.    Focus on improving lumbar ROM as well as LE/core strength with emphasis on ADL performance     Outpatient Physical Therapy 2 times weekly for 12 weeks to include the following interventions: Cervical/Lumbar Traction, Electrical Stimulation as needed, Gait Training, Manual Therapy, Moist Heat/ Ice, Neuromuscular Re-ed, Orthotic Management and Training, Patient Education, Self Care, Therapeutic Activities, and Therapeutic Exercise, Blood Flow Restriction Training, Trigger Point Dry Needling as needed.    Alonso Griffith, PT, DPT

## 2024-12-27 ENCOUNTER — PATIENT MESSAGE (OUTPATIENT)
Dept: OPTOMETRY | Facility: CLINIC | Age: 64
End: 2024-12-27
Payer: COMMERCIAL

## 2024-12-31 ENCOUNTER — CLINICAL SUPPORT (OUTPATIENT)
Dept: REHABILITATION | Facility: OTHER | Age: 64
End: 2024-12-31
Payer: COMMERCIAL

## 2024-12-31 DIAGNOSIS — M54.16 LUMBAR RADICULOPATHY: Primary | ICD-10-CM

## 2024-12-31 DIAGNOSIS — M62.81 WEAKNESS OF TRUNK MUSCULATURE: ICD-10-CM

## 2024-12-31 DIAGNOSIS — M53.86 DECREASED RANGE OF MOTION OF LUMBAR SPINE: ICD-10-CM

## 2024-12-31 PROCEDURE — 97530 THERAPEUTIC ACTIVITIES: CPT | Mod: PN,CQ

## 2024-12-31 PROCEDURE — 97112 NEUROMUSCULAR REEDUCATION: CPT | Mod: PN,CQ

## 2024-12-31 NOTE — PROGRESS NOTES
[facility]   Physical Therapy Treatment Note     Name: Val ROE [facility] Number: [id number]    Therapy Diagnosis:   Encounter Diagnoses   Name Primary?    Lumbar radiculopathy Yes    Weakness of trunk musculature     Decreased range of motion of lumbar spine          Physician: Cresencio Spivey MD    Visit Date: 12/31/2024    Physician Orders: PT Eval and Treat   Medical Diagnosis from Referral:  Lumbar radiculopathy [M54.16], Greater trochanteric bursitis of left hip [M70.62]   Evaluation Date: 11/14/2024  Authorization Period Expiration: 12/31/2024  Plan of Care Expiration: 12/31/2024  Visit # / Visits authorized: 11/12  FOTO 1st follow up: 12/10/24; score 52  FOTO 2nd follow up:    Precautions: Standard    Time In: 0645  Time Out: 0750  Total Billable Time: 61 minutes    SUBJECTIVE     Pt reports: she is feeling well today. Not as much pain. She is ready to continue therapy with her home program and will purchase a ball this week.     She was compliant with home exercise program.  Response to previous treatment: tolerated well, no issues  Functional change: walking better    Prior Level of Function: Independent with all ADL/iADLs   Current Level of Function: Independent with all ADL/iADLs      Pain:  Current 1/10, worst 6/10, best 3/10   Location: bilateral low back and left Lower Extremity    Description: Aching, Dull, Deep, and Shooting  Aggravating Factors: Sitting, Standing, Walking, Night Time, Morning, Extension, Flexing, Lifting, and Getting out of bed/chair  Easing Factors: pain medication, ice, and rest     Pts goals: improve her back pain and Leg pain to climb stairs    OBJECTIVE     Objective Measures updated at progress report unless specified.     Treatment     Val received the treatments listed below:      manual therapy techniques: Joint mobilizations and Myofacial release were applied to the: Lumbar for 00 minutes, including:  Lumbar L3 HVLA   Lumbar L3  "Gapping    neuromuscular re-education activities to improve: Kinesthetic, Sense, Proprioception, Posture, and Muscle Activation for 23 minutes. The following activities were included:  Recumbent x 5 min L3  LTR on red ball, x 3 min  Bird Dogs 2 x 10 reps   Supine TrA + marching, 20x  Bridging 3 hold x 30  Clams, Red TB 30x  Prone press ups, 10x10"  Pull down + march GTB 3x10    Val participated in dynamic functional therapeutic activities to improve functional performance for 38 minutes, including:  Sit to Stands with pilates ring squeeze 3x 10 reps   Quadruped Rocking 15x's    Seated silver SB rollouts 15x10" hold  Lateral Walks Green 2 laps   Pallof press GTB 3x10  Sled Push 2 laps 45#   Shuttles Squats 2 cords 3 x 15 reps     Patient Education and Home Exercises     Home Exercises Provided and Patient Education Provided     Education provided:   - Pt education regarding proper technique for there-ex/HEP, as well as rationale for exercise/ POC.     Written Home Exercises Provided: Patient instructed to cont prior HEP. Exercises were reviewed and Val was able to demonstrate them prior to the end of the session.  Val demonstrated good  understanding of the education provided. See EMR under Patient Instructions for exercises provided during therapy sessions    ASSESSMENT   Val tolerated session well today. She was able to maintain improved trunk control with quadruped exercises with less compensation. Her core/LE strength appear to be improving. She is independent in her HEP and all therapy related questions were satisfied. Pt reported improved ADL performance via functional questionnaire/FOTO  last visit, when compared to initial evaluation.    Pt is to be discharged from PT at this time. PT/PTA face to face conference with Alonso Griffith DPT concerning discharge.        Val Is progressing well towards her goals.   Pt prognosis is Good.      Pt will continue to benefit from skilled outpatient physical " therapy to address the deficits listed in the problem list box on initial evaluation, provide pt/family education and to maximize pt's level of independence in the home and community environment.     Pt's spiritual, cultural and educational needs considered and pt agreeable to plan of care and goals.     Anticipated Barriers for therapy: None    Goals:  Short Term Goals:  4 weeks  1.Report decreased low back pain pain  < / =  5/10  to increase tolerance for ADLs - (progressing, not met)  2. Increase ROM by 10% where limited in order to perform ADLs without difficulty. - (progressing, not met)  3. Increase strength by 1/3 MMT grade in Left gluteus medius  to increase tolerance for ADL and work activities. - (progressing, not met)  4. Pt to tolerate HEP to improve ROM and independence with ADL's - (progressing, not met)      Long Term Goals: 8 weeks  1.Report decreased low back pain < / = 3/10  to increase tolerance for work related tasks such as stairs. - (progressing, not met)  2.Patient goal: improve her low back pain and be able to climb stairs with less difficulty. - (progressing, not met)  3.Increase strength to 4+/5 in bilateral glutes  to increase tolerance for ADL and work activities. - (progressing, not met)  4. Pt will report at 34% score on FOTO  to demonstrate increase in LE function with every day tasks. - ( met 12/23/24)        PLAN     Plan of care Certification: 11/14/2024 to 12/31/2024.    Pt is discharged from PT to [facility]    Davi Noguera PTA

## 2025-01-08 ENCOUNTER — TELEPHONE (OUTPATIENT)
Dept: INTERNAL MEDICINE | Facility: CLINIC | Age: 65
End: 2025-01-08
Payer: COMMERCIAL

## 2025-01-08 DIAGNOSIS — E66.9 OBESITY (BMI 30-39.9): Primary | ICD-10-CM

## 2025-01-08 NOTE — TELEPHONE ENCOUNTER
----- Message from Ashley sent at 1/6/2025 11:19 AM CST -----  Contact: Patient  [phone]  [email or web address]   INCORRECT BILL - INCORRECT CODE    Patient is calling for Medical Advice regarding: hemoglobin test done on 7/ 27/24 was assigned an incorrect code which resulted in a charge to the patient of $81.68. per [employer] ([phone]) 24/7  the PHYSICIAN needs to call and provide the correct code.    How long has patient had these symptoms:    Pharmacy name and phone#:    Patient wants a call back or thru [facility]:    Comments:    Please advise patient replies from provider may take up to 48 hours.

## 2025-01-08 NOTE — TELEPHONE ENCOUNTER
We have tried calling [facility] but no answer - we coded as annual but that apparently did not cover the test - is there any other code you could try ?

## 2025-01-09 ENCOUNTER — PROCEDURE VISIT (OUTPATIENT)
Dept: PAIN MEDICINE | Facility: CLINIC | Age: 65
End: 2025-01-09
Attending: ANESTHESIOLOGY
Payer: COMMERCIAL

## 2025-01-09 VITALS
SYSTOLIC BLOOD PRESSURE: 129 MMHG | HEART RATE: 63 BPM | WEIGHT: 154.31 LBS | BODY MASS INDEX: 29.16 KG/M2 | TEMPERATURE: 97 F | DIASTOLIC BLOOD PRESSURE: 72 MMHG

## 2025-01-09 DIAGNOSIS — M70.62 GREATER TROCHANTERIC BURSITIS OF LEFT HIP: Primary | ICD-10-CM

## 2025-01-09 NOTE — PROCEDURES
" Patient Name: Val Horvath  MRN: [id number]    INFORMED CONSENT: The procedure, risks, benefits and options were discussed with patient. There are no contraindications to the procedure. The patient expressed understanding and agreed to proceed. The personnel performing the procedure was discussed. I verify that I personally obtained Val's consent prior to the start of the procedure and the signed consent can be found on the patient's chart.    Procedure Date: 01/09/2025    Anesthesia: Topical    Pre Procedure diagnosis:   1. Greater trochanteric bursitis of left hip        Post-Procedure diagnosis: same      Sedation: None    PROCEDURE: LEFT GREATER TROCHANTERIC BURSA INJECTION under ultrasound guidance      DESCRIPTION OF PROCEDURE: The patient was brought to the procedure room. . The patient was positioned left lateral position on table.  The skin overlying the Left greater trochanter was prepped with chloroprep and draped in a sterile fashion.  A  22 gauge 3.5" spinal quincke needle was slowly advanced through under ultrasound guidance into the Left greater trochanteric bursa. . Negative aspiration was confirmed. . A combination of 4 cc of Bupivacaine 0.25% and 1 cc of  40 mg/mL kenalog (total 5 cc) was easily injected. The needle was removed and bleeding was nil. A sterile dressing was applied.  Patient tolerated procedure with no complications     Blood Loss: Nill  Specimen: None    Cresencio Spivey MD      "

## 2025-01-09 NOTE — TELEPHONE ENCOUNTER
I called and spoke with jammie HORNE @ 341am - patient can disregard the bill and it will take about 45 days for it to recycle - Bill number [phone] - patient was notified @ 913am

## 2025-02-07 RX ORDER — FLUTICASONE PROPIONATE 50 MCG
SPRAY, SUSPENSION (ML) NASAL
Qty: 32 G | Refills: 1 | Status: SHIPPED | OUTPATIENT
Start: 2025-02-07

## 2025-02-07 NOTE — TELEPHONE ENCOUNTER
Refill Routing Note   Medication(s) are not appropriate for processing by [facility] for the following reason(s):        Drug-disease interaction    [facility] action(s):  Defer        Medication Therapy Plan: Drug-Disease: fluticasone propionate and Post herpetic neuralgia; DEFER    Pharmacist review requested: Yes     Appointments  past 12m or future 3m with PCP    Date Provider   Last Visit   7/25/2024 India Camacho MD   Next Visit   3/7/2025 India Camacho MD   ED visits in past 90 days: 0        Note composed:11:00 AM 02/07/2025

## 2025-02-07 NOTE — TELEPHONE ENCOUNTER
Refill Decision Note   Val Alexandru  is requesting a refill authorization.  Brief Assessment and Rationale for Refill:  Approve     Medication Therapy Plan:         Pharmacist review requested: Yes   Comments:     Note composed:12:10 PM 02/07/2025

## 2025-02-07 NOTE — TELEPHONE ENCOUNTER
No care due was identified.  [facility] Embedded Care Due Messages. Reference number: [id number].   2/07/2025 8:06:22 AM CST

## 2025-02-17 ENCOUNTER — TELEPHONE (OUTPATIENT)
Dept: PAIN MEDICINE | Facility: CLINIC | Age: 65
End: 2025-02-17

## 2025-02-17 ENCOUNTER — OFFICE VISIT (OUTPATIENT)
Dept: PAIN MEDICINE | Facility: CLINIC | Age: 65
End: 2025-02-17
Payer: COMMERCIAL

## 2025-02-17 VITALS
DIASTOLIC BLOOD PRESSURE: 61 MMHG | WEIGHT: 145 LBS | HEIGHT: 61 IN | HEART RATE: 88 BPM | SYSTOLIC BLOOD PRESSURE: 138 MMHG | RESPIRATION RATE: 18 BRPM | TEMPERATURE: 98 F | OXYGEN SATURATION: 100 % | BODY MASS INDEX: 27.38 KG/M2

## 2025-02-17 DIAGNOSIS — M79.18 PIRIFORMIS MUSCLE PAIN: ICD-10-CM

## 2025-02-17 DIAGNOSIS — G89.4 CHRONIC PAIN SYNDROME: ICD-10-CM

## 2025-02-17 DIAGNOSIS — M47.816 FACET ARTHROPATHY, LUMBAR: ICD-10-CM

## 2025-02-17 DIAGNOSIS — M70.61 GREATER TROCHANTERIC BURSITIS OF RIGHT HIP: ICD-10-CM

## 2025-02-17 DIAGNOSIS — M47.816 LUMBAR SPONDYLOSIS: ICD-10-CM

## 2025-02-17 DIAGNOSIS — M70.62 GREATER TROCHANTERIC BURSITIS OF LEFT HIP: Primary | ICD-10-CM

## 2025-02-17 DIAGNOSIS — M54.16 LUMBAR RADICULOPATHY: ICD-10-CM

## 2025-02-17 DIAGNOSIS — M79.18 MYOFASCIAL PAIN: ICD-10-CM

## 2025-02-17 PROCEDURE — 3075F SYST BP GE 130 - 139MM HG: CPT | Mod: CPTII,S$GLB,,

## 2025-02-17 PROCEDURE — 3078F DIAST BP <80 MM HG: CPT | Mod: CPTII,S$GLB,,

## 2025-02-17 PROCEDURE — 1159F MED LIST DOCD IN RCRD: CPT | Mod: CPTII,S$GLB,,

## 2025-02-17 PROCEDURE — 3008F BODY MASS INDEX DOCD: CPT | Mod: CPTII,S$GLB,,

## 2025-02-17 PROCEDURE — 1160F RVW MEDS BY RX/DR IN RCRD: CPT | Mod: CPTII,S$GLB,,

## 2025-02-17 NOTE — PROGRESS NOTES
Interventional Pain Management - Established Visit  Follow-Up     PCP: India Camacho MD    REFERRING PHYSICIAN: No ref. provider found    CHIEF COMPLAINT: lower back pain     Interval History 2/17/2025:  Val Horvath returns to clinic for follow-up after left GTB injection on 1/9/2025. She reports 80% relief. 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 She also has right buttock pain that she describes as throbbing. She cannot sleep on her right side due to the pain. She takes tylenol 3-4 times per day with good relief. She denies any perceived side effects. she denies recent health changes. She denies recent falls or trauma. She denies new onset fever/night sweats, urinary incontinence, bowel incontinence, significant weight changes, significant motor weakness or changes, or loss of sensations. Her pain today is 8/10.      Interval History 10/30/2024:  Val Horvath returns to clinic for virtual appointment for follow-up after Left L3/4 TFESI on 10/16/2024. She reports limited pain relief of leg pain. She continues with left-sided lateral thigh pain. She states the pain is worse with walking. She also states she cannot sleep on her left side. Previous in-office physical exam did show bilateral GTB tenderness. She continues tylenol and ice with good benefit. She denies any perceived side effects. She denies recent health changes. She denies recent falls or trauma. She denies new onset fever/night sweats, urinary incontinence, bowel incontinence, significant weight changes, significant motor weakness or changes, or loss of sensations. Her pain today is 8/10.      Original HPI 9/19/2024:  Original HISTORY OF PRESENT ILLNESS: Val Horvath presents to the clinic for the evaluation of the above pain. . Patient was seen by Orthopedic PA Ade Gutierres and was sent to procedure clinic for Left L4/5 and L5/S1 TFESI which she underwent 7/31/24. She had  80% relief of her lower back pain, but this did not relieve her leg pain. Patient reports the back pain is well controlled now. She is most concerned about shooting pain down L leg to the L knee (does not extend to the foot).     Original Pain Description:  The pain is located in the R anterior thigh and the L buttock radiating down the left lateral thigh. The right leg pain started after a fall at work in July (no litigation involved with work). The pain is described as aching and throbbing. Exacerbating factors: wearing tight clothes and going up stairs. Mitigating factors laying down and rest. Symptoms interfere with daily activity, sleeping, and work. The patient feels like symptoms have been unchanged. Patient denies night fever/night sweats, urinary incontinence, bowel incontinence, significant weight loss, significant motor weakness, and loss of sensations.    Original PAIN SCORES:  Best: Pain is 5  Worst: Pain is 9  Current: Pain is 6        1/9/2025     8:15 AM   Last 3 PDI Scores   Pain Disability Index (PDI) 36           Conservative therapy:  PT: not currently participating   Chiro:  HEP in the past 6 months: patient preforming daily HEP with limited benefit       Treatments / Medications: (Ice/Heat/NSAIDS/APAP/etc):  Tylenol  Ice   Unable to take NSAIDs 2/2 gastric issues       Interventional Pain Procedures: (Previous injections)  7/31/24 - Left L4/5 and L5/S1 TFESI (Direct Referral) - 80% back pain relief  10/16/2024 - Left L3/4 TFESI - limited pain relief of left lateral thigh pain  1/9/2025 - Left GTB injection - 80% relief        IMAGING:    MRI LUMBAR SPINE WITHOUT CONTRAST 7/8/24     CLINICAL HISTORY:  Lumbar radiculopathy, symptoms persist with conservative treatment; Radiculopathy, lumbar region     TECHNIQUE:  Multiplanar, multisequence MR images were acquired from the thoracolumbar junction to the sacrum without contrast.     COMPARISON:  XR L-spine 04/24/2024.     FINDINGS:  Alignment:  Normal sagittal alignment.  No spondylolisthesis.     Vertebrae: Vertebral body heights are maintained.  No fracture or marrow infiltrative process.     Discs: Degenerative disc desiccation with mild height loss at L3-L4 and L4-L5.  Trace bone marrow edema/Modic 1 changes along the posterior aspect of the L4 superior endplate.     Cord: Conus terminates at L2 and appears unremarkable.  Cauda equina appears unremarkable.     Degenerative findings:     T12-L1: No spinal canal stenosis or neural foraminal narrowing.     L1-L2: No spinal canal stenosis or neural foraminal narrowing.     L2-L3: No spinal canal stenosis or neural foraminal narrowing.     L3-L4: Circumferential disc bulge causes mass effect on the ventral thecal sac and lateral recesses and likely abuts the bilateral descending L4 nerve root.  Bilateral ligamentum flavum hypertrophy.  Mild spinal canal stenosis.  Mild-to-moderate left neural foraminal narrowing.     L4-L5: Circumferential disc bulge causes mass effect on the ventral thecal sac and lateral recesses and closely approximates the bilateral descending L5 nerve roots.  Right extraforaminal broad-based protrusion closely approximates the right exiting L4 nerve root.  No spinal canal stenosis.  Mild right neural foraminal narrowing.     L5-S1: Posterior central/left subarticular broad-based disc bulge closely approximates the left descending S1 nerve root.  Bilateral facet arthropathy with mild right-sided periarticular soft tissue edema.  No spinal canal stenosis.  Mild left neural foraminal narrowing.     Paraspinal muscles & soft tissues: Paraspinal muscles are unremarkable.  Partially visualized T2 hyperintense lesion within the right hepatic lobe, possibly a small cyst or benign hemangioma.  Few small bilateral simple renal cysts.     Impression:     Multilevel lumbar spondylosis as detailed above.  Mild spinal canal stenosis at L3-L4.  Multilevel mild-to-moderate neural foraminal  narrowing.      Past Medical History:   Diagnosis Date    Anxiety     AR (allergic rhinitis) 3/10/2014    Diverticulosis 2010    Family history of breast cancer in sister     Genital herpes     Hiatal hernia 2010    Irritable bowel syndrome     Meningioma 3/10/2014    MRI 2/14    Post herpetic neuralgia 1/21/2014 1/14      Past Surgical History:   Procedure Laterality Date    CARPAL TUNNEL RELEASE Right 1990s    COLONOSCOPY N/A 12/2/2020    Procedure: COLONOSCOPY;  Surgeon: Pieter Gomez MD;  Location: [facility] (4TH FLR);  Service: Endoscopy;  Laterality: N/A;  patient due for colonoscopy in November 2020  prep ins. emailed  and mailed - COVID screening on 11/29/20 [facility]  patient confirmed new arrival time of 0900-BB    ESOPHAGOGASTRODUODENOSCOPY N/A 12/1/2023    Procedure: EGD (ESOPHAGOGASTRODUODENOSCOPY);  Surgeon: Pieter Gomez MD;  Location: [facility] (4TH FLR);  Service: Endoscopy;  Laterality: N/A;  time frame 5-12 weeks  dr. gomez patient  prep instructions given to pt in [facility] and via portal -  pt is hypoglycemic  11/24 precall complete-ml    ESOPHAGOGASTRODUODENOSCOPY N/A 2/26/2024    Procedure: EGD (ESOPHAGOGASTRODUODENOSCOPY);  Surgeon: Pieter Gomez MD;  Location: [facility] (2ND FLR);  Service: Endoscopy;  Laterality: N/A;  Issac pt  prep instructions sent to pt via portal    2/19-precall complete-MS    OOPHORECTOMY Right 1970s    TRANSFORAMINAL EPIDURAL INJECTION OF STEROID Left 7/31/2024    Procedure: LUMBAR TRANSFORAMINAL LEFT L4/5 AND L5/S1 DIRECT REFERRAL;  Surgeon: Cresencio Spivey MD;  Location: [facility];  Service: Pain Management;  Laterality: Left;  [phone]    TRANSFORAMINAL EPIDURAL INJECTION OF STEROID Left 10/16/2024    Procedure: LUMBAR TRANSFORAMINAL LEFT L3/4;  Surgeon: Cresencio Spivey MD;  Location: [facility];  Service: Pain Management;  Laterality: Left;  [phone]  2 WK F/U DANII     Social History     Socioeconomic History    Marital  status: Single    Number of children: 1   Occupational History    Occupation: Works Security     Employer: [employer]   Tobacco Use    Smoking status: Never     Passive exposure: Never    Smokeless tobacco: Never   Substance and Sexual Activity    Alcohol use: Yes     Comment: rare    Drug use: Never    Sexual activity: Yes     Partners: Male   Social History Narrative    The patient does exercise regularly (bikes QW-BIW).      She is not satisfied with weight.    Rates diet as good.    She does drink at least 1/2 gallon water daily.    She drinks 0 coffee/tea/caffeine-containing soft drinks daily.    Total sleep time at night is 8 hours.    She works 40 hours per week.    She does wear seat belts.    Hobbies include none.     Social Drivers of Health     Financial Resource Strain: Medium Risk (7/24/2024)    Overall Financial Resource Strain (CARDIA)     Difficulty of Paying Living Expenses: Somewhat hard   Food Insecurity: Food Insecurity Present (7/24/2024)    Hunger Vital Sign     Worried About Running Out of Food in the Last Year: Sometimes true     Ran Out of Food in the Last Year: Sometimes true   Physical Activity: Sufficiently Active (7/24/2024)    Exercise Vital Sign     Days of Exercise per Week: 3 days     Minutes of Exercise per Session: 110 min   Stress: No Stress Concern Present (7/24/2024)    [employer] - Occupational Stress Questionnaire     Feeling of Stress : Only a little   Housing Stability: Unknown (7/24/2024)    Housing Stability Vital Sign     Unable to Pay for Housing in the Last Year: No     Family History   Problem Relation Name Age of Onset    Heart disease Mother  65        MI    Hypertension Mother      Kidney disease Mother      Bladder Cancer Father          bladder    Breast cancer Sister  45    No Known Problems Brother      Cancer Paternal Uncle          colon cancer        Review of patient's allergies indicates:   Allergen Reactions     Codeine Itching    Sulfa (sulfonamide antibiotics) Itching       Current Outpatient Medications   Medication Sig    acyclovir (ZOVIRAX) 400 MG tablet Take 1 tablet (400 mg total) by mouth 2 (two) times daily.    albuterol (ACCUNEB) 1.25 mg/3 mL Nebu Take 3 mLs (1.25 mg total) by nebulization every 6 (six) hours as needed. Rescue    albuterol (VENTOLIN HFA) 90 mcg/actuation inhaler Inhale 2 puffs into the lungs every 6 (six) hours as needed for Wheezing. Rescue    ALPRAZolam (XANAX) 0.25 MG tablet Take 1 tablet (0.25 mg total) by mouth 2 (two) times daily as needed for Anxiety.    cetirizine (ZYRTEC) 10 MG tablet TAKE 1 TABLET BY MOUTH ONCE DAILY    cyclobenzaprine (FLEXERIL) 5 MG tablet Take 1 tablet (5 mg total) by mouth nightly as needed for Muscle spasms.    fluticasone propionate (FLONASE) 50 mcg/actuation nasal spray SHAKE LIQUID AND USE 1 SPRAY IN EACH NOSTRIL DAILY AS NEEDED    gabapentin (NEURONTIN) 300 MG capsule TAKE UP TO 6 CAPSULES BY MOUTH EVERY DAY    ibuprofen (ADVIL,MOTRIN) 600 MG tablet Take 1 tablet (600 mg total) by mouth every 8 (eight) hours as needed for Pain.    mometasone (NASONEX) 50 mcg/actuation nasal spray 1-2 sprays by Nasal route once daily.    nebulizers Misc Use as instructed daily prn with nebulizer medication    pantoprazole (PROTONIX) 40 MG tablet TAKE 1 TABLET(40 MG) BY MOUTH DAILY    terconazole (TERAZOL 7) 0.4 % Crea Place 1 applicator vaginally every evening.     No current facility-administered medications for this visit.           ROS:  GENERAL: No fever. No chills. No fatigue. Denies weight loss. Denies weight gain.  HEENT: Denies headaches. Denies vision change. Denies eye pain. Denies double vision. Denies ear pain.   CV: Denies chest pain.   PULM: Denies of shortness of breath.  GI: Denies constipation. No diarrhea. No abdominal pain. Denies nausea. Denies vomiting. No blood in stool.  HEME: Denies bleeding problems.  : Denies urgency. No painful urination. No blood in  "urine.  MS: Denies joint stiffness. Denies joint swelling.  Denies back pain.  SKIN: Denies rash.   NEURO: Denies seizures. No weakness.  PSYCH:  Denies difficulty sleeping. No anxiety. Denies depression. No suicidal thoughts.       VITALS:   Vitals:    02/17/25 1515   BP: 138/61   Pulse: 88   Resp: 18   Temp: 98 °F (36.7 °C)   SpO2: 100%   Weight: 65.8 kg (145 lb)   Height: 5' 1" (1.549 m)   PainSc:   8   PainLoc: Leg         PHYSICAL EXAM:   GENERAL: Well appearing, in no acute distress, alert and oriented x3.  PSYCH:  Mood and affect appropriate.  SKIN: Skin color, texture, turgor normal, no rashes or lesions.  HEENT:  Normocephalic, atraumatic. Cranial nerves grossly intact.  NECK:   No pain to palpation over the cervical paraspinous muscles.   no pain to palpation over facets.   no pain with neck flexion, extension, or lateral flexion.   Spurling test negative  PULM: No evidence of respiratory difficulty, symmetric chest rise.  GI:  Non-distended  BACK:   Normal range of motion.   No pain to palpation over the spinous processes.   Mild pain to palpation over facet joints.   Negative axial loading test bilateral.   Negative tenderness over bilateral SIJ. Negative thigh and sacral thrust test, Negative FABERE, Ganselin and Yeoman's test.   GTB tenderness bilaterally right > left  Pain to palpation to right piriformis.   EXTREMITIES:   No deformities, edema, or skin discoloration.   Shoulder, hip, and knee provocative maneuvers are negative. No atrophy is noted.   Negative FADIR    Negative for knee limited ROM, negative ant and post drawer sign.  NEURO: Sensation is equal and appropriate bilaterally. Bilateral upper and lower extremity strength is normal and symmetric. Bilateral upper and lower extremity coordination and muscle stretch reflexes are physiologic and symmetric. Plantar response are downgoing. Straight leg raising in the supine position is negative to radicular pain.   GAIT: normal "         ASSESSMENT: 64 y.o. year old with low back pain, consistent with:    1. Greater trochanteric bursitis of left hip        2. Greater trochanteric bursitis of right hip        3. Myofascial pain        4. Piriformis muscle pain        5. Lumbar radiculopathy        6. Chronic pain syndrome        7. Facet arthropathy, lumbar        8. Lumbar spondylosis              PLAN:      Patient Education:  Discussed the importance of physical therapy, activity modification, and a home exercise plan to help with pain and improve health.  MRI previously reviewed and discussed with patient by Dr Spivey: She has noticeable disc bulge's at L3/4, L4/5, and L5/S1 causing neural foraminal narrowing on the L side.   Therapy referral:  New referral to physical therapy today. Provided patient with hip bursitis exercises to perform daily via AVS for today's visit.   Medications:   Patient can continue with pain medications for now per their provider since they are providing benefits, using them appropriately, and without side effects.  She can continue Tylenol 1000 mg up to TID PRN for pain.  Continue ice PRN  She cannot take NSAIDs  Continue biofreeze spray to left knee.   Restart Flexeril 5 mg nightly prn muscle spasms.   I cannot prescribe opioids and we do not recommend opioid pain medications for back or GTB pain.  Interventions: she is s/p left GTB injection with 80% relief.  Antonina patient for right GTB and piriformis in office.   Counseled patient: regarding the importance of activity modification, constant sleeping habits, and physical therapy.  Return to clinic: 3-4 weeks after above procedure.     The above plan and management options were discussed at length with patient. Patient is in agreement with the above and verbalized understanding.     Kathy Granger NP  02/17/2025

## 2025-02-21 DIAGNOSIS — F41.1 GENERALIZED ANXIETY DISORDER WITH PANIC ATTACKS: Primary | ICD-10-CM

## 2025-02-21 DIAGNOSIS — F41.0 GENERALIZED ANXIETY DISORDER WITH PANIC ATTACKS: Primary | ICD-10-CM

## 2025-02-21 RX ORDER — ALPRAZOLAM 0.25 MG/1
0.25 TABLET ORAL 2 TIMES DAILY PRN
Qty: 180 TABLET | Refills: 0 | Status: SHIPPED | OUTPATIENT
Start: 2025-02-21

## 2025-02-21 NOTE — TELEPHONE ENCOUNTER
No care due was identified.  [facility] Embedded Care Due Messages. Reference number: [id number].   2/21/2025 10:37:11 AM CST

## 2025-02-21 NOTE — TELEPHONE ENCOUNTER
----- Message from Allegra sent at 2/21/2025  8:23 AM CST -----  Contact: [phone] Patient  Please call and advise. Requesting an RX refill or new RX.Is this a refill or new RX: newRX name and strength (copy/paste from chart):  ALPRAZolam (XANAX) 0.25 MG tabletIs this a 30 day or 90 day RX: Pharmacy name and phone # (copy/paste from chart):  [facility] #[id number] - [address] [address]: [phone] Fax: [phone] The doctors have asked that we provide their patients with the following 2 reminders -- prescription refills can take up to 72 hours, and a friendly reminder that in the future you can use your [facility] account to request refills: yes

## 2025-02-25 ENCOUNTER — TELEPHONE (OUTPATIENT)
Dept: INTERNAL MEDICINE | Facility: CLINIC | Age: 65
End: 2025-02-25
Payer: COMMERCIAL

## 2025-02-25 DIAGNOSIS — R73.03 PREDIABETES: Primary | ICD-10-CM

## 2025-02-25 NOTE — TELEPHONE ENCOUNTER
Please notify patient I have ordered labs to be done prior to her upcoming appointment with Dr. Camacho - please schedule labs prior to 3/7/25.

## 2025-02-28 ENCOUNTER — TELEPHONE (OUTPATIENT)
Dept: INTERNAL MEDICINE | Facility: CLINIC | Age: 65
End: 2025-02-28
Payer: COMMERCIAL

## 2025-02-28 NOTE — TELEPHONE ENCOUNTER
Returned pt call and notified she will need to speak with [facility] in regards to bills we do not handle.

## 2025-02-28 NOTE — TELEPHONE ENCOUNTER
----- Message from Clotilde sent at 2/28/2025  2:39 PM CST -----  Contact: [phone]  Patient called, to inform that she got a bill form [facility] stating that she have a bill, but she has not been seen by them she is scheduled to be 03/08 for the blood work. She want to inform that.

## 2025-02-28 NOTE — TELEPHONE ENCOUNTER
----- Message from Clotilde sent at 2/28/2025  2:40 PM CST -----  Contact: [phone]  Requesting an RX refill or new RX.Is this a refill or new RX: Refill 1RX name and strength (copy/paste from chart):  cetirizine (ZYRTEC) 10 MG tabletIs this a 30 day or 90 day RX: Pharmacy name and phone # (copy/paste from chart):  [facility] #[id number] [address] AT [facility] Phone: [phone]: [phone] doctors have asked that we provide their patients with the following 2 reminders -- prescription refills can take up to 72 hours, and a friendly reminder that in the future you can use your [facility] account to request refills:

## 2025-03-07 ENCOUNTER — OFFICE VISIT (OUTPATIENT)
Dept: INTERNAL MEDICINE | Facility: CLINIC | Age: 65
End: 2025-03-07
Payer: COMMERCIAL

## 2025-03-07 VITALS
BODY MASS INDEX: 29.13 KG/M2 | HEART RATE: 83 BPM | DIASTOLIC BLOOD PRESSURE: 66 MMHG | HEIGHT: 61 IN | TEMPERATURE: 97 F | OXYGEN SATURATION: 98 % | WEIGHT: 154.31 LBS | SYSTOLIC BLOOD PRESSURE: 118 MMHG

## 2025-03-07 DIAGNOSIS — B02.29 POST HERPETIC NEURALGIA: ICD-10-CM

## 2025-03-07 DIAGNOSIS — R73.03 PREDIABETES: ICD-10-CM

## 2025-03-07 DIAGNOSIS — F41.0 GENERALIZED ANXIETY DISORDER WITH PANIC ATTACKS: ICD-10-CM

## 2025-03-07 DIAGNOSIS — L20.9 ATOPIC DERMATITIS, UNSPECIFIED TYPE: Primary | ICD-10-CM

## 2025-03-07 DIAGNOSIS — E78.2 MIXED HYPERLIPIDEMIA: ICD-10-CM

## 2025-03-07 DIAGNOSIS — I87.2 VENOUS INSUFFICIENCY (CHRONIC) (PERIPHERAL): ICD-10-CM

## 2025-03-07 DIAGNOSIS — F41.1 GENERALIZED ANXIETY DISORDER WITH PANIC ATTACKS: ICD-10-CM

## 2025-03-07 PROCEDURE — 99999 PR PBB SHADOW E&M-EST. PATIENT-LVL IV: CPT | Mod: PBBFAC,,, | Performed by: HOSPITALIST

## 2025-03-07 RX ORDER — FUROSEMIDE 20 MG/1
20 TABLET ORAL DAILY PRN
Qty: 30 TABLET | Refills: 5 | Status: SHIPPED | OUTPATIENT
Start: 2025-03-07 | End: 2026-03-07

## 2025-03-07 RX ORDER — FLUTICASONE PROPIONATE 50 MCG
SPRAY, SUSPENSION (ML) NASAL
Qty: 32 G | Refills: 3 | Status: SHIPPED | OUTPATIENT
Start: 2025-03-07

## 2025-03-07 RX ORDER — GABAPENTIN 300 MG/1
300-600 CAPSULE ORAL 3 TIMES DAILY PRN
Qty: 180 CAPSULE | Refills: 11 | Status: SHIPPED | OUTPATIENT
Start: 2025-03-07

## 2025-03-07 RX ORDER — TRIAMCINOLONE ACETONIDE 1 MG/G
CREAM TOPICAL 2 TIMES DAILY
Qty: 28.4 G | Refills: 2 | Status: SHIPPED | OUTPATIENT
Start: 2025-03-07

## 2025-03-07 NOTE — PROGRESS NOTES
Subjective:     @Patient ID: Val Horvath is a 64 y.o. female.    Chief Complaint: Follow-up    HPI  62 yo F with prediabetes, HLD, anxiety, seasonal alleries, IBS, GERD, family hx of breast ca, hiatal hernia, diverticulosis, meningioma presents for 6 month f/u for refill of   Alprazolam.and for evaluation of a rash    SKIN:  She reports a single dry spot on the right side of her body. She denies any other rash or skin lesions elsewhere.    MUSCULOSKELETAL:  She experiences finger joint pain due to arthritis, particularly with manipulation. She also reports persistent right thigh swelling and wears compression stockings while standing for prolonged periods at work.    MEDICATIONS:  She takes gabapentin as needed for pain management, with variable daily dosing up to 6 capsules depending on pain severity.      ROS:  Musculoskeletal: +joint pain  Integumentary: +rash, +dry skin       Review of Systems   Psychiatric/Behavioral:  Negative for agitation and confusion.      Past medical history, surgical history, and family medical history reviewed and updated as appropriate.    Medications and allergies reviewed.     Objective:     There were no vitals filed for this visit.  There is no height or weight on file to calculate BMI.  Physical Exam  Constitutional:       Appearance: Normal appearance.   HENT:      Head: Normocephalic and atraumatic.   Pulmonary:      Effort: Pulmonary effort is normal.   Musculoskeletal:      Comments: +varicose veins BLE.  Mild bilateral lower extremity edema   Skin:     General: Skin is warm and dry.      Comments: + dry patch of skin of R upper back    Neurological:      Mental Status: She is alert and oriented to person, place, and time.   Psychiatric:         Mood and Affect: Mood normal.         Behavior: Behavior normal.         Lab Results   Component Value Date    WBC 6.7 07/27/2024    HGB 13.5 07/27/2024    HCT 42.2 07/27/2024     07/27/2024    CHOL 221 (H) 07/27/2024     TRIG 124 07/27/2024    HDL 72 07/27/2024    ALT 12 07/27/2024    AST 18 07/27/2024     07/27/2024    K 4.2 07/27/2024     07/27/2024    CREATININE 0.67 07/27/2024    BUN 16 07/27/2024    CO2 26 07/27/2024    TSH 1.08 07/27/2024    HGBA1C 5.9 (H) 07/27/2024       Assessment:     1. Atopic dermatitis, unspecified type    2. Generalized anxiety disorder with panic attacks    3. Prediabetes    4. Mixed hyperlipidemia    5. Post herpetic neuralgia      Plan:   Val was seen today for follow-up.    Diagnoses and all orders for this visit:    Atopic dermatitis, unspecified type  Start triamcinolone cream.    Venous insufficiency (chronic) (peripheral)  Counseled on wearing compression stockings consistently.  Also counseled on low-salt diet.  Will prescribe diuretic to take as needed for leg swelling.    Prediabetes  - stable.  Patient has upcoming lab appointment    Mixed hyperlipidemia  - stable.  Patient has upcoming lab appointment    Post herpetic neuralgia  -     gabapentin (NEURONTIN) 300 MG capsule; Take 1-2 capsules (300-600 mg total) by mouth 3 (three) times daily as needed (pain).    Generalized anxiety disorder with panic attacks  - Stable. Continue home meds     Other orders  -     furosemide (LASIX) 20 MG tablet; Take 1 tablet (20 mg total) by mouth daily as needed (leg swelling).  -     fluticasone propionate (FLONASE) 50 mcg/actuation nasal spray; SHAKE LIQUID AND USE 1 SPRAY IN EACH NOSTRIL DAILY AS NEEDED  -     triamcinolone acetonide 0.1% (KENALOG) 0.1 % cream; Apply topically 2 (two) times daily. Up to 2 weeks    Assessment & Plan     Post herpetic neuralgia :   Refilled gabapentin prescription for up to 6 capsules daily as needed for pain.   Reviewed and discussed gabapentin prescription with the patient.   Ms. Horvath reports ongoing pain and variable use of gabapentin for pain management.       Atopic dermatitis   Prescribed Triamcinolone cream to be applied to the rash 2 times daily  for up to 2 weeks.   Examined the rash and identified it as an eczema-like patch on the patient's back.   Ms. Horvath reports a dry, rash-like spot on their back.    Venous insufficiency:   Discussed the importance of wearing compression stockings to manage leg swelling.   Advised the patient to wear compression stockings daily when on feet, especially at work.   Examined the patient's legs and noted slight difference in size, attributing swelling to varicose veins.   Ms. Horvath reports ongoing swelling in thigh.   Prescribed Lasix (furosemide) to be taken on days with extra swelling.   Advised the patient to take the diuretic only on days with increased swelling.   Recommend daily use of compression stockings at work.   Advised the patient to reduce sodium intake.   Ms. Horvath reports swelling in thigh, particularly when standing at work.   Explained the connection between salt intake and fluid retention.   Recommend reducing salt intake to help manage swelling.    MEDICATIONS/SUPPLEMENTS:   Refilled Flonase.    LABS:   Ordered follow-up labs: hemoglobin A1C and cholesterol.    FOLLOW UP:   Follow up in 6 months for annual visit (around September).   Contact office 2 weeks before annual visit to schedule labs prior to appointment.   Complete ordered labs at [facility].            India Camacho MD  Internal Medicine    3/7/2025    This note was generated with the assistance of ambient listening technology. Verbal consent was obtained by the patient and accompanying visitor(s) for the recording of patient appointment to facilitate this note. I attest to having reviewed and edited the generated note for accuracy, though some syntax or spelling errors may persist. Please contact the author of this note for any clarification.

## 2025-03-10 ENCOUNTER — TELEPHONE (OUTPATIENT)
Dept: INTERNAL MEDICINE | Facility: CLINIC | Age: 65
End: 2025-03-10
Payer: COMMERCIAL

## 2025-03-10 ENCOUNTER — RESULTS FOLLOW-UP (OUTPATIENT)
Dept: INTERNAL MEDICINE | Facility: CLINIC | Age: 65
End: 2025-03-10

## 2025-03-10 NOTE — TELEPHONE ENCOUNTER
I spoke to Zuleima with [facility].  Zuleima is calling on the behalf of the patient.  The patient is being billed for labs that was ordered by Dr. Camacho on 07/25/2024.  The patient had an annual visit and labs were coded as an annual.  Zuleima stated that [facility] sent over the diagnosis as obesity. Zuleima informed  And will contact [facility].

## 2025-03-18 ENCOUNTER — TELEPHONE (OUTPATIENT)
Dept: INTERNAL MEDICINE | Facility: CLINIC | Age: 65
End: 2025-03-18

## 2025-03-18 NOTE — TELEPHONE ENCOUNTER
Attempted to return call to both [facility] and [facility].  Call unsuccessful.  We will try back.

## 2025-03-18 NOTE — TELEPHONE ENCOUNTER
----- Message from Paz sent at 3/17/2025  9:47 AM CDT -----  Contact: Zuleima [phone]  Zuleima OH from [facility] to speak to nurse Auguste in regards to a claim/lab services.  She said she reached out to [employer].  Jordana said they received orders on 01/09/25 to change the coding.  That is the reason the claim was changed.  She said it should be for annual wellness labs. Please call back to advise.  If she doesn't answer please call someone in provider services at [phone].  She said the claim was processed properly and it was denied.

## 2025-03-20 ENCOUNTER — TELEPHONE (OUTPATIENT)
Dept: PAIN MEDICINE | Facility: CLINIC | Age: 65
End: 2025-03-20
Payer: COMMERCIAL

## 2025-03-20 NOTE — TELEPHONE ENCOUNTER
----- Message from [employer] sent at 3/20/2025 10:05 AM CDT -----   Name of Who is Calling: What is the request in detail:  patient request call back in reference to scheduled appointment 04/10/25   questions and concerns Please contact to further discuss and advise  Can the clinic reply by [facility]: What Number to Call Back if not in [facility]:    [phone]

## 2025-03-30 ENCOUNTER — HOSPITAL ENCOUNTER (EMERGENCY)
Facility: OTHER | Age: 65
Discharge: HOME | End: 2025-03-30
Attending: EMERGENCY MEDICINE
Payer: OTHER MISCELLANEOUS

## 2025-03-30 VITALS
OXYGEN SATURATION: 98 % | BODY MASS INDEX: 29.07 KG/M2 | RESPIRATION RATE: 18 BRPM | TEMPERATURE: 98 F | SYSTOLIC BLOOD PRESSURE: 126 MMHG | HEART RATE: 76 BPM | WEIGHT: 154 LBS | HEIGHT: 61 IN | DIASTOLIC BLOOD PRESSURE: 60 MMHG
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
## 2025-03-30 DIAGNOSIS — M79.18 MYALGIA, MULTIPLE SITES: ICD-10-CM

## 2025-03-30 PROCEDURE — 96372 THER/PROPH/DIAG INJ SC/IM: CPT | Performed by: EMERGENCY MEDICINE

## 2025-03-30 PROCEDURE — 99284 EMERGENCY DEPT VISIT MOD MDM: CPT | Mod: 25

## 2025-03-30 PROCEDURE — 63600175 PHARM REV CODE 636 W HCPCS: Mod: JZ,TB | Performed by: EMERGENCY MEDICINE

## 2025-03-30 RX ORDER — IBUPROFEN 600 MG/1
600 TABLET ORAL EVERY 6 HOURS PRN
Qty: 30 TABLET | Refills: 0 | Status: SHIPPED | OUTPATIENT
Start: 2025-03-30

## 2025-03-30 RX ORDER — KETOROLAC TROMETHAMINE 30 MG/ML
30 INJECTION, SOLUTION INTRAMUSCULAR; INTRAVENOUS
Status: COMPLETED | OUTPATIENT
Start: 2025-03-30 | End: 2025-03-30

## 2025-03-30 RX ORDER — CYCLOBENZAPRINE HCL 5 MG
5 TABLET ORAL 3 TIMES DAILY PRN
Qty: 20 TABLET | Refills: 0 | Status: SHIPPED | OUTPATIENT
Start: 2025-03-30 | End: 2025-04-04

## 2025-03-30 RX ADMIN — KETOROLAC TROMETHAMINE 30 MG: 30 INJECTION, SOLUTION INTRAMUSCULAR; INTRAVENOUS at 04:03
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
## 2025-04-08 ENCOUNTER — TELEPHONE (OUTPATIENT)
Dept: PAIN MEDICINE | Facility: CLINIC | Age: 65
End: 2025-04-08
Payer: COMMERCIAL

## 2025-04-08 NOTE — TELEPHONE ENCOUNTER
----- Message from Janet sent at 4/8/2025  9:13 AM CDT -----  Regarding: PT'S REQUESTING A CALL BACK FROM STAFF  Contact: PT  Name of Caller:Nature of Call: Pt is calling to see if a [facility] Number:Confirmed contact info below:Contact Name: Val HorvathPhone Number: [phone] Information: N/A

## 2025-04-08 NOTE — TELEPHONE ENCOUNTER
Patient has an upcoming office visit with Patric where she will discuss workers com paper work and request for release of medical records.

## 2025-04-09 ENCOUNTER — TELEPHONE (OUTPATIENT)
Dept: GASTROENTEROLOGY | Facility: CLINIC | Age: 65
End: 2025-04-09
Payer: COMMERCIAL

## 2025-04-09 NOTE — TELEPHONE ENCOUNTER
Copied from CRM #[id number]. Topic: General Inquiry - Patient Advice  >> 2025 12:00 PM Jory wrote:  Pt is requesting a  call from someone in the office and is asking for a return call back soon. Thanks.     Reason for call:discuss plan of care for f/u      Patient's DX:     Patient requesting call back or [facility] [phone]

## 2025-04-09 NOTE — TELEPHONE ENCOUNTER
Spoke with patient. She is not having any GI issues. She was inquiring she need to be seen for refills. Informed patient.that refills can be gotten through her PCP. If she wants to see a GI provider, she can give us a call. She stated that she did not need to see anyone right now.

## 2025-04-10 ENCOUNTER — TELEPHONE (OUTPATIENT)
Dept: PAIN MEDICINE | Facility: CLINIC | Age: 65
End: 2025-04-10

## 2025-04-10 ENCOUNTER — PROCEDURE VISIT (OUTPATIENT)
Dept: PAIN MEDICINE | Facility: CLINIC | Age: 65
End: 2025-04-10
Attending: ANESTHESIOLOGY
Payer: COMMERCIAL

## 2025-04-10 VITALS
SYSTOLIC BLOOD PRESSURE: 128 MMHG | DIASTOLIC BLOOD PRESSURE: 68 MMHG | WEIGHT: 154.13 LBS | OXYGEN SATURATION: 100 % | HEART RATE: 74 BPM | HEIGHT: 61 IN | BODY MASS INDEX: 29.1 KG/M2 | RESPIRATION RATE: 19 BRPM

## 2025-04-10 DIAGNOSIS — M70.60 GREATER TROCHANTERIC BURSITIS, UNSPECIFIED LATERALITY: ICD-10-CM

## 2025-04-10 DIAGNOSIS — M79.18 MYOFASCIAL PAIN: Primary | ICD-10-CM

## 2025-04-10 PROCEDURE — 20552 NJX 1/MLT TRIGGER POINT 1/2: CPT | Mod: 59,S$GLB,, | Performed by: ANESTHESIOLOGY

## 2025-04-10 PROCEDURE — 20611 DRAIN/INJ JOINT/BURSA W/US: CPT | Mod: RT,S$GLB,, | Performed by: ANESTHESIOLOGY

## 2025-04-10 PROCEDURE — 76942 ECHO GUIDE FOR BIOPSY: CPT | Mod: 26,59,S$GLB, | Performed by: ANESTHESIOLOGY

## 2025-04-10 NOTE — PROCEDURES
"Patient Name: Val Horvath  MRN: [id number]    INFORMED CONSENT: The procedure, risks, benefits and options were discussed with patient. There are no contraindications to the procedure. The patient expressed understanding and agreed to proceed. The personnel performing the procedure was discussed. I verify that I personally obtained Val's consent prior to the start of the procedure and the signed consent can be found on the patient's chart.    Procedure Date: 04/10/2025    Anesthesia: Topical    Pre Procedure diagnosis:   Right piriformis syndrome   1. Myofascial pain        2. Greater trochanteric bursitis, unspecified laterality              Post-Procedure diagnosis: SAME      Sedation: None    PROCEDURE: Right PIRIFORMIS MUSCLE INJECTION UNDER U/S GUIDE        DESCRIPTION OF PROCEDURE: The patient was brought to the procedure room.  The patient was positioned prone on the procedure table. . The skin was prepped with chlorhexidine three times, and draped in a sterile fashion. The gluteus samira and piriformis muscles were visualized and demarcated by the sheath and appears as a hyperechoic band. The 22-gauge 4 inch stimplex needle was advanced until ultrasound visualization showed it traversing the gluteus samira and piercing  the piriformis muscle.  After negative aspiration for blood,  5 ml of Bupivacaine 0.25% and 20 mg depomedrol was slowly administered. The needle was removed and bleeding was nil.  A sterile dressing was applied.         PROCEDURE: right  GREATER TROCHANTERIC BURSA INJECTION under ultrasound guidance      DESCRIPTION OF PROCEDURE: The patient was brought to the procedure room. . The patient was positioned left lateral position on table. . . The skin overlying the right greater trochanter was prepped with povidone-iodine three times and draped in a sterile fashion.  A  21 gauge 4." simplex needle was slowly advanced through under ultrasound guidance into the right greater trochanteric " bursa. . Negative aspiration was confirmed. . A combination of 5 cc of Bupivacaine 0.25% and 20 mg kenalog was easily injected. The needle was removed and bleeding was nil. A sterile dressing was applied. Patient tolerated procedure with no complications     Blood Loss: Nill  Specimen: None    Cresencio Spivey MD

## 2025-04-24 ENCOUNTER — OFFICE VISIT (OUTPATIENT)
Dept: URGENT CARE | Facility: CLINIC | Age: 65
End: 2025-04-24
Payer: OTHER MISCELLANEOUS

## 2025-04-24 VITALS
WEIGHT: 154 LBS | RESPIRATION RATE: 18 BRPM | HEART RATE: 68 BPM | DIASTOLIC BLOOD PRESSURE: 73 MMHG | SYSTOLIC BLOOD PRESSURE: 118 MMHG | BODY MASS INDEX: 29.07 KG/M2 | OXYGEN SATURATION: 98 % | TEMPERATURE: 98 F | HEIGHT: 61 IN

## 2025-04-24 DIAGNOSIS — T07.XXXA MULTIPLE INJURIES DUE TO TRAUMA: ICD-10-CM
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
## 2025-04-24 DIAGNOSIS — Z02.6 ENCOUNTER RELATED TO WORKER'S COMPENSATION CLAIM: ICD-10-CM

## 2025-04-24 DIAGNOSIS — S80.12XA CONTUSION OF LEFT KNEE AND LOWER LEG, INITIAL ENCOUNTER: ICD-10-CM

## 2025-04-24 DIAGNOSIS — M25.552 BILATERAL HIP PAIN: ICD-10-CM
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
## 2025-04-24 DIAGNOSIS — M54.9 DORSALGIA, UNSPECIFIED: ICD-10-CM

## 2025-04-24 DIAGNOSIS — M25.551 BILATERAL HIP PAIN: ICD-10-CM

## 2025-04-24 DIAGNOSIS — S06.0X0A CONCUSSION WITHOUT LOSS OF CONSCIOUSNESS, INITIAL ENCOUNTER: ICD-10-CM

## 2025-04-24 DIAGNOSIS — S39.012A ACUTE MYOFASCIAL STRAIN OF LUMBAR REGION, INITIAL ENCOUNTER: Primary | ICD-10-CM
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
## 2025-04-24 DIAGNOSIS — S50.12XA CONTUSION OF LEFT FOREARM, INITIAL ENCOUNTER: ICD-10-CM

## 2025-04-24 DIAGNOSIS — S90.02XA CONTUSION OF LEFT ANKLE, INITIAL ENCOUNTER: ICD-10-CM

## 2025-04-24 DIAGNOSIS — S16.1XXA STRAIN OF NECK MUSCLE, INITIAL ENCOUNTER: ICD-10-CM

## 2025-04-24 DIAGNOSIS — S80.02XA CONTUSION OF LEFT KNEE AND LOWER LEG, INITIAL ENCOUNTER: ICD-10-CM

## 2025-04-24 PROCEDURE — 72100 X-RAY EXAM L-S SPINE 2/3 VWS: CPT | Mod: S$GLB,,, | Performed by: RADIOLOGY

## 2025-04-24 PROCEDURE — 73562 X-RAY EXAM OF KNEE 3: CPT | Mod: LT,S$GLB,, | Performed by: RADIOLOGY

## 2025-04-24 RX ORDER — METHOCARBAMOL 500 MG/1
500 TABLET, FILM COATED ORAL 3 TIMES DAILY PRN
Qty: 30 TABLET | Refills: 0 | Status: SHIPPED | OUTPATIENT
Start: 2025-04-24 | End: 2025-05-04

## 2025-04-24 RX ORDER — METHYLPREDNISOLONE 4 MG/1
TABLET ORAL
Qty: 1 EACH | Refills: 0 | Status: SHIPPED | OUTPATIENT
Start: 2025-04-24
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
## 2025-05-01 ENCOUNTER — TELEPHONE (OUTPATIENT)
Dept: URGENT CARE | Facility: CLINIC | Age: 65
End: 2025-05-01
Payer: COMMERCIAL

## 2025-05-01 NOTE — TELEPHONE ENCOUNTER
Patient called inquiring about getting scheduled for PT. She states that her  informed her that she sent the approval for her PT to us.  I informed the patient that the lady in Pre-Service has not received anything from the  and Pre-Service sent the information over to the  Cherelle Alcazar again on today.  Patient states that she will reach back out to her  in the morning, because she is telling her that she has sent the information. AFG

## 2025-05-06 ENCOUNTER — CLINICAL SUPPORT (OUTPATIENT)
Dept: REHABILITATION | Facility: OTHER | Age: 65
End: 2025-05-06
Payer: OTHER MISCELLANEOUS

## 2025-05-06 DIAGNOSIS — M53.86 DECREASED RANGE OF MOTION OF LUMBAR SPINE: Primary | ICD-10-CM

## 2025-05-06 DIAGNOSIS — R29.898 DECREASED STRENGTH OF LOWER EXTREMITY: ICD-10-CM

## 2025-05-06 DIAGNOSIS — R29.898 DECREASED RANGE OF MOTION OF NECK: ICD-10-CM

## 2025-05-06 PROCEDURE — 97161 PT EVAL LOW COMPLEX 20 MIN: CPT | Mod: PN

## 2025-05-06 PROCEDURE — 97110 THERAPEUTIC EXERCISES: CPT | Mod: PN
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
## 2025-05-07 PROBLEM — R29.898 DECREASED STRENGTH OF LOWER EXTREMITY: Status: ACTIVE | Noted: 2025-05-07

## 2025-05-07 PROBLEM — R29.898 DECREASED RANGE OF MOTION OF NECK: Status: ACTIVE | Noted: 2025-05-07

## 2025-05-08 ENCOUNTER — CLINICAL SUPPORT (OUTPATIENT)
Dept: REHABILITATION | Facility: OTHER | Age: 65
End: 2025-05-08
Payer: OTHER MISCELLANEOUS

## 2025-05-08 ENCOUNTER — DOCUMENTATION ONLY (OUTPATIENT)
Dept: REHABILITATION | Facility: OTHER | Age: 65
End: 2025-05-08
Payer: COMMERCIAL

## 2025-05-08 DIAGNOSIS — R29.898 DECREASED RANGE OF MOTION OF NECK: Primary | ICD-10-CM

## 2025-05-08 DIAGNOSIS — R29.898 DECREASED STRENGTH OF LOWER EXTREMITY: ICD-10-CM

## 2025-05-08 PROCEDURE — 97112 NEUROMUSCULAR REEDUCATION: CPT | Mod: PN,CQ

## 2025-05-08 PROCEDURE — 97530 THERAPEUTIC ACTIVITIES: CPT | Mod: PN,CQ

## 2025-05-08 NOTE — PROGRESS NOTES
"  Outpatient Rehab    Physical Therapy Visit    Patient Name: Val Horvath  MRN: [id number]  YOB: 1960  Encounter Date: 5/8/2025    Therapy Diagnosis:   Encounter Diagnoses   Name Primary?    Decreased range of motion of neck Yes    Decreased strength of lower extremity      Physician: Kathy Granger NP    Physician Orders: Eval and Treat  Medical Diagnosis: Acute myofascial strain of lumbar region, initial encounter  Strain of neck muscle, initial encounter  Work related injury    Physician Orders: Eval and Treat  Medical Diagnosis: Acute myofascial strain of lumbar region, initial encounter  Strain of neck muscle, initial encounter  Work related injury     Visit # / Visits Authorized:  2 / 12  Insurance Authorization Period: 5/2/2025 to 11/2/2025  Date of Evaluation: 5/6/2025  Plan of Care Certification: 5/6/2025 to 7/29/2025   Time In: 0618   Time Out: 0720  Total Time (in minutes): 62   Total Billable Time (in minutes): 61    FOTO:  Intake Score:  %  Survey Score 2:  %  Survey Score 3:  %         Subjective   Still feeling continued pain in her back. She had a revial she attended yesterday and is a bit sore in legs as well..  Pain reported as 9/10.      Objective            Treatment:  Balance/Neuromuscular Re-Education  NMR 1: Nu-Step x 10 min L3- for strength, endruance and to promote trunk rotation  NMR 2: LTR on red ball, x 3 min  NMR 3: DKTC on red ball, 10x10"  NMR 4: Bridging 3" hold x 20  NMR 5: PPT  3" hold x 20  Therapeutic Activity  TA 1: SLR 2x10 R/L  TA 2: Pallof press RTB 20x R/L  TA 3: 3-way seated silver SB rollouts 10" x 10 ea  TA 4: Open books 15x10" ea side  TA 5: Rows with TrA 3x10 RTB    Time Entry(in minutes):  Neuromuscular Re-Education Time Entry: 38  Therapeutic Activity Time Entry: 23    Assessment & Plan   Assessment: Joselin arrived early per her request and was acomodated. Alonso Griffith DPT was present during treatment. She presents with muscle weakness in core/LE and " decreased ROM, these are contributing to  decreased functional ability. This is negatively impacting this individuals ability with ADL's. Will continue to address these deficits and promote improved strength, ROM and functional performance as tolerated.       Patient will continue to benefit from skilled outpatient physical therapy to address the deficits listed in the problem list box on initial evaluation, provide pt/family education and to maximize pt's level of independence in the home and community environment.     Patient's spiritual, cultural, and educational needs considered and patient agreeable to plan of care and goals.           Plan: Focus on LE/core strength and ROM with emphasis on posture and work performance    Goals:   Active       Ambulation/movement       Patient will walk 1 mile without pain       Start:  05/06/25    Expected End:  07/29/25               Functional outcome       Patient will show a significant change in FOTO patient-reported outcome tool to demonstrate subjective improvement       Start:  05/06/25    Expected End:  07/29/25            Patient stated goal:   Would like to be able to work without pain or restrictions       Start:  05/06/25    Expected End:  07/29/25            Patient will demonstrate independence in home program for support of progression       Start:  05/06/25    Expected End:  07/29/25               Lifting & carrying objects       Patient will carry 15# without pain       Start:  05/06/25    Expected End:  07/29/25               Maintaining body position       Patient will maintain standing for 2 hours without pain       Start:  05/06/25    Expected End:  07/29/25               Pain       Patient will report pain of 1/10 demonstrating a reduction of overall pain       Start:  05/06/25    Expected End:  07/29/25               Range of Motion       Patient will achieve bilateral cervical side bending ROM 30 degrees pain free       Start:  05/06/25    Expected  End:  07/29/25            Patient will achieve bilateral cervical rotation ROM 65 degrees pain free       Start:  05/06/25    Expected End:  07/29/25            Patient will achieve cervical flexion ROM 35 degrees pain free       Start:  05/06/25    Expected End:  07/29/25            Patient will achieve cervical extension ROM 40  degrees pain free       Start:  05/06/25    Expected End:  07/29/25            Patient will achieve spinal flexion to 100% pain free       Start:  05/06/25    Expected End:  07/29/25            Patient will achieve spinal extension to 100% pain free       Start:  05/06/25    Expected End:  07/29/25            Patient will achieve bilateral spinal side bending % pain free        Start:  05/06/25    Expected End:  07/29/25               Strength       Patient will achieve bilateral hip extension strength of 5/5       Start:  05/06/25    Expected End:  07/29/25            Patient will achieve bilateral knee flexion strength of 5/5       Start:  05/06/25    Expected End:  07/29/25            Patient will achieve bilateral knee extension strength of 5/5       Start:  05/06/25    Expected End:  07/29/25                Davi Noguera PTA

## 2025-05-08 NOTE — PROGRESS NOTES
Physical Therapist and Physical Therapist Assistant met face to face to discuss patient's treatment plan, status and progress towards established goals.     Davi Noguera, PTA  05/08/2025

## 2025-05-13 ENCOUNTER — CLINICAL SUPPORT (OUTPATIENT)
Dept: REHABILITATION | Facility: OTHER | Age: 65
End: 2025-05-13
Payer: OTHER MISCELLANEOUS

## 2025-05-13 DIAGNOSIS — R29.898 DECREASED RANGE OF MOTION OF NECK: Primary | ICD-10-CM

## 2025-05-13 DIAGNOSIS — R29.898 DECREASED STRENGTH OF LOWER EXTREMITY: ICD-10-CM

## 2025-05-13 PROCEDURE — 97112 NEUROMUSCULAR REEDUCATION: CPT | Mod: PN,CQ

## 2025-05-13 PROCEDURE — 97530 THERAPEUTIC ACTIVITIES: CPT | Mod: PN,CQ

## 2025-05-13 NOTE — PROGRESS NOTES
"  Outpatient Rehab    Physical Therapy Visit    Patient Name: Val Horvath  MRN: [id number]  YOB: 1960  Encounter Date: 5/13/2025    Therapy Diagnosis:   Encounter Diagnoses   Name Primary?    Decreased range of motion of neck Yes    Decreased strength of lower extremity        Physician: Kathy Granger NP    Physician Orders: Eval and Treat  Medical Diagnosis: Acute myofascial strain of lumbar region, initial encounter  Strain of neck muscle, initial encounter  Work related injury    Physician Orders: Eval and Treat  Medical Diagnosis: Acute myofascial strain of lumbar region, initial encounter  Strain of neck muscle, initial encounter  Work related injury     Visit # / Visits Authorized:  4 / 12  Insurance Authorization Period: 5/2/2025 to 11/2/2025  Date of Evaluation: 5/6/2025  Plan of Care Certification: 5/6/2025 to 7/29/2025   Time In: 0629   Time Out: 0730  Total Time (in minutes): 61   Total Billable Time (in minutes): 61    FOTO:  Intake Score:  %  Survey Score 2:  %  Survey Score 3:  %         Subjective   She is feeling less pain in her back/hips today.  She has been perfomring her HEP.  Pain reported as 5/10.      Objective            Treatment:  Balance/Neuromuscular Re-Education  NMR 1: Nu-Step x 10 min L3- for strength, endruance and to promote trunk rotation  NMR 2: LTR on red ball, x 3 min  NMR 3: DKTC on red ball, 10x10"  NMR 4: Bridging 3" hold x 20  NMR 5: PPT  3" hold x 20  NMR 6: Cervical rotation with ball at wall, 20x R/L  Therapeutic Activity  TA 1: SLR 2x10 R/L  TA 2: Pallof press RTB 30x R/L  TA 3: 3-way seated silver SB rollouts 10" x 10 ea  TA 4: Open books 15x10" ea side  TA 5: Rows with TrA 3x10 RTB    Time Entry(in minutes):  Neuromuscular Re-Education Time Entry: 38  Therapeutic Activity Time Entry: 23    Assessment & Plan   Assessment: Vla was able to demonstrate improved TrA activation as well as perform increased reps with there-ex today. She also was less " guarded with rotational /transitional movements. We will keep progressing exercises as she tolerates it.       Patient will continue to benefit from skilled outpatient physical therapy to address the deficits listed in the problem list box on initial evaluation, provide pt/family education and to maximize pt's level of independence in the home and community environment.     Patient's spiritual, cultural, and educational needs considered and patient agreeable to plan of care and goals.           Plan: Focus on LE/core strength and ROM with emphasis on posture and work performance    Goals:   Active       Ambulation/movement       Patient will walk 1 mile without pain       Start:  05/06/25    Expected End:  07/29/25               Functional outcome       Patient will show a significant change in FOTO patient-reported outcome tool to demonstrate subjective improvement       Start:  05/06/25    Expected End:  07/29/25            Patient stated goal:   Would like to be able to work without pain or restrictions       Start:  05/06/25    Expected End:  07/29/25            Patient will demonstrate independence in home program for support of progression       Start:  05/06/25    Expected End:  07/29/25               Lifting & carrying objects       Patient will carry 15# without pain       Start:  05/06/25    Expected End:  07/29/25               Maintaining body position       Patient will maintain standing for 2 hours without pain       Start:  05/06/25    Expected End:  07/29/25               Pain       Patient will report pain of 1/10 demonstrating a reduction of overall pain       Start:  05/06/25    Expected End:  07/29/25               Range of Motion       Patient will achieve bilateral cervical side bending ROM 30 degrees pain free       Start:  05/06/25    Expected End:  07/29/25            Patient will achieve bilateral cervical rotation ROM 65 degrees pain free       Start:  05/06/25    Expected End:  07/29/25             Patient will achieve cervical flexion ROM 35 degrees pain free       Start:  05/06/25    Expected End:  07/29/25            Patient will achieve cervical extension ROM 40  degrees pain free       Start:  05/06/25    Expected End:  07/29/25            Patient will achieve spinal flexion to 100% pain free       Start:  05/06/25    Expected End:  07/29/25            Patient will achieve spinal extension to 100% pain free       Start:  05/06/25    Expected End:  07/29/25            Patient will achieve bilateral spinal side bending % pain free        Start:  05/06/25    Expected End:  07/29/25               Strength       Patient will achieve bilateral hip extension strength of 5/5       Start:  05/06/25    Expected End:  07/29/25            Patient will achieve bilateral knee flexion strength of 5/5       Start:  05/06/25    Expected End:  07/29/25            Patient will achieve bilateral knee extension strength of 5/5       Start:  05/06/25    Expected End:  07/29/25                  Davi Noguera PTA

## 2025-05-15 ENCOUNTER — CLINICAL SUPPORT (OUTPATIENT)
Dept: REHABILITATION | Facility: OTHER | Age: 65
End: 2025-05-15
Payer: OTHER MISCELLANEOUS

## 2025-05-15 DIAGNOSIS — R29.898 DECREASED RANGE OF MOTION OF NECK: Primary | ICD-10-CM

## 2025-05-15 DIAGNOSIS — R29.898 DECREASED STRENGTH OF LOWER EXTREMITY: ICD-10-CM

## 2025-05-15 PROCEDURE — 97112 NEUROMUSCULAR REEDUCATION: CPT | Mod: PN

## 2025-05-15 PROCEDURE — 97110 THERAPEUTIC EXERCISES: CPT | Mod: PN

## 2025-05-15 PROCEDURE — 97530 THERAPEUTIC ACTIVITIES: CPT | Mod: PN

## 2025-05-15 NOTE — PROGRESS NOTES
"  Outpatient Rehab    Physical Therapy Visit    Patient Name: Val Horvath  MRN: [id number]  YOB: 1960  Encounter Date: 5/15/2025    Therapy Diagnosis:   Encounter Diagnoses   Name Primary?    Decreased range of motion of neck Yes    Decreased strength of lower extremity        Physician: Kathy Granger NP    Physician Orders: Eval and Treat  Medical Diagnosis: Acute myofascial strain of lumbar region, initial encounter  Strain of neck muscle, initial encounter  Work related injury    Physician Orders: Eval and Treat  Medical Diagnosis: Acute myofascial strain of lumbar region, initial encounter  Strain of neck muscle, initial encounter  Work related injury     Visit # / Visits Authorized:  4 / 12  Insurance Authorization Period: 5/2/2025 to 11/2/2025  Date of Evaluation: 5/6/2025  Plan of Care Certification: 5/6/2025 to 7/29/2025   Time In: 0630   Time Out: 0730  Total Time (in minutes): 60   Total Billable Time (in minutes): 60    FOTO:  Intake Score:  %  Survey Score 2:  %  Survey Score 3:  %         Subjective   She is doing very well. Wants to be done with PT next week.  Pain reported as 2/10. low back    Objective            Treatment:  Therapeutic Exercise  TE 1: Recumbent Bike 6'  TE 2: Supine trunk rotations 15x each  TE 3: Cervical range of motion each way 5x each way  TE 4: Open books 15x's each  Balance/Neuromuscular Re-Education  NMR 1: Supine Marches 15x's each  NMR 2: LTR on red ball, x 3 min  NMR 3: DKTC on red ball, 10x10"  NMR 4: Bridging 3" hold x 20  NMR 5: PPT  3" hold x 20  NMR 6: Cervical rotation with ball at wall, 20x R/L  Therapeutic Activity  TA 1: SLR 2x10 R/L  TA 2: Pallof press RTB 30x R/L  TA 3: 3-way seated silver SB rollouts 10" x 10 ea  TA 4: Sit to Stand 10x's  TA 5: Rows with TrA 3x10 RTB    Time Entry(in minutes):  Neuromuscular Re-Education Time Entry: 23  Therapeutic Activity Time Entry: 23  Therapeutic Exercise Time Entry: 10    Assessment & Plan   Assessment: " Patient will benefit from skilled physical therapy services to address deficits listed in initial evaluation and will be continually re-assessed and progressed as tolerated. Patient and therapist, through shared decision making; determined that physical therapy plan of care should be continued and patient verbally agreed to continue treatment. Patient required continued verbal and tactile cues while performing specific exercises to address deficits and was able to properly correct within session today.  Evaluation/Treatment Tolerance: Patient tolerated treatment well    Patient will continue to benefit from skilled outpatient physical therapy to address the deficits listed in the problem list box on initial evaluation, provide pt/family education and to maximize pt's level of independence in the home and community environment.     Patient's spiritual, cultural, and educational needs considered and patient agreeable to plan of care and goals.     Education  Education was done with Patient. The patient's learning style includes Demonstration, Listening, and Pictures/video. The patient Demonstrates understanding and Verbalizes understanding.         Educated in home exercise program.       Plan: Patient will be continually assessed and progressed as appropriate to achieve long and short term goals.    Goals:   Active       Ambulation/movement       Patient will walk 1 mile without pain (Progressing)       Start:  05/06/25    Expected End:  07/29/25               Functional outcome       Patient will show a significant change in FOTO patient-reported outcome tool to demonstrate subjective improvement (Progressing)       Start:  05/06/25    Expected End:  07/29/25            Patient stated goal:   Would like to be able to work without pain or restrictions (Progressing)       Start:  05/06/25    Expected End:  07/29/25            Patient will demonstrate independence in home program for support of progression  (Progressing)       Start:  05/06/25    Expected End:  07/29/25               Lifting & carrying objects       Patient will carry 15# without pain (Progressing)       Start:  05/06/25    Expected End:  07/29/25               Maintaining body position       Patient will maintain standing for 2 hours without pain (Progressing)       Start:  05/06/25    Expected End:  07/29/25               Range of Motion       Patient will achieve bilateral cervical side bending ROM 30 degrees pain free (Progressing)       Start:  05/06/25    Expected End:  07/29/25            Patient will achieve bilateral cervical rotation ROM 65 degrees pain free (Progressing)       Start:  05/06/25    Expected End:  07/29/25            Patient will achieve cervical flexion ROM 35 degrees pain free (Progressing)       Start:  05/06/25    Expected End:  07/29/25            Patient will achieve cervical extension ROM 40  degrees pain free (Progressing)       Start:  05/06/25    Expected End:  07/29/25            Patient will achieve spinal flexion to 100% pain free (Progressing)       Start:  05/06/25    Expected End:  07/29/25            Patient will achieve spinal extension to 100% pain free (Progressing)       Start:  05/06/25    Expected End:  07/29/25            Patient will achieve bilateral spinal side bending % pain free  (Progressing)       Start:  05/06/25    Expected End:  07/29/25               Strength       Patient will achieve bilateral hip extension strength of 5/5 (Progressing)       Start:  05/06/25    Expected End:  07/29/25            Patient will achieve bilateral knee flexion strength of 5/5 (Progressing)       Start:  05/06/25    Expected End:  07/29/25            Patient will achieve bilateral knee extension strength of 5/5 (Progressing)       Start:  05/06/25    Expected End:  07/29/25              Resolved       Pain       Patient will report pain of 1/10 demonstrating a reduction of overall pain (Met)       Start:   05/06/25    Expected End:  07/29/25    Resolved:  05/15/25               Alonso Griffith PT, DPT

## 2025-05-20 ENCOUNTER — CLINICAL SUPPORT (OUTPATIENT)
Dept: REHABILITATION | Facility: OTHER | Age: 65
End: 2025-05-20
Payer: OTHER MISCELLANEOUS

## 2025-05-20 DIAGNOSIS — R29.898 DECREASED RANGE OF MOTION OF NECK: Primary | ICD-10-CM

## 2025-05-20 DIAGNOSIS — R29.898 DECREASED STRENGTH OF LOWER EXTREMITY: ICD-10-CM

## 2025-05-20 PROCEDURE — 97530 THERAPEUTIC ACTIVITIES: CPT | Mod: PN,CQ

## 2025-05-20 PROCEDURE — 97112 NEUROMUSCULAR REEDUCATION: CPT | Mod: PN,CQ

## 2025-05-20 NOTE — PROGRESS NOTES
"  Outpatient Rehab    Physical Therapy Visit    Patient Name: Val Horvath  MRN: [id number]  YOB: 1960  Encounter Date: 5/20/2025    Therapy Diagnosis:   Encounter Diagnoses   Name Primary?    Decreased range of motion of neck Yes    Decreased strength of lower extremity        Physician: Kathy Granger NP    Physician Orders: Eval and Treat  Medical Diagnosis: Acute myofascial strain of lumbar region, initial encounter  Strain of neck muscle, initial encounter  Work related injury    Physician Orders: Eval and Treat  Medical Diagnosis: Acute myofascial strain of lumbar region, initial encounter  Strain of neck muscle, initial encounter  Work related injury     Visit # / Visits Authorized:  5 / 12  Insurance Authorization Period: 5/2/2025 to 11/2/2025  Date of Evaluation: 5/6/2025  Plan of Care Certification: 5/6/2025 to 7/29/2025   Time In: 0630   Time Out: 0725  Total Time (in minutes): 55   Total Billable Time (in minutes): 55    FOTO:  Intake Score:  %  Survey Score 2:  %  Survey Score 3:  %         Subjective   Feeling much better. She has been having some L hip pain but its unrelated to her main injury..  Pain reported as 1/10. low back    Objective            Treatment:  Balance/Neuromuscular Re-Education  NMR 1: Supine Marches 30x's each  NMR 2: LTR on red ball, x 3 min  NMR 3: DKTC on red ball, 10x10"  NMR 4: Bridging 3" hold x 30  NMR 5: PPT  3" hold x 30  NMR 6: Cervical rotation with ball at wall, 20x R/L  Therapeutic Activity  TA 1: SLR 3x10 R/L  TA 2: Pallof press RTB 30x R/L  TA 3: 3-way seated silver SB rollouts 10" x 10 ea  TA 4: Sit to Stand with pilaties ring squeze 3x10x  TA 5: Rows with TrA 3x10 RTB  TA 6: Hesitation marching 10+ KB 80ft x 4    Time Entry(in minutes):  Neuromuscular Re-Education Time Entry: 25  Therapeutic Activity Time Entry: 30    Assessment & Plan   Assessment: Val was able to complete increased reps of resisted exercises today. She demonsatrted improved " posture and TrA activation. Pt reproted pain is diminishing to tolerable levels. Plan is to d/c next visit. Pt is agreeable with d/c and will continue with her HEP.       Patient will continue to benefit from skilled outpatient physical therapy to address the deficits listed in the problem list box on initial evaluation, provide pt/family education and to maximize pt's level of independence in the home and community environment.     Patient's spiritual, cultural, and educational needs considered and patient agreeable to plan of care and goals.             Plan: Patient will be continually assessed and progressed as appropriate to achieve long and short term goals.    Goals:   Active       Ambulation/movement       Patient will walk 1 mile without pain (Progressing)       Start:  05/06/25    Expected End:  07/29/25               Functional outcome       Patient will show a significant change in FOTO patient-reported outcome tool to demonstrate subjective improvement (Progressing)       Start:  05/06/25    Expected End:  07/29/25            Patient stated goal:   Would like to be able to work without pain or restrictions (Progressing)       Start:  05/06/25    Expected End:  07/29/25            Patient will demonstrate independence in home program for support of progression (Progressing)       Start:  05/06/25    Expected End:  07/29/25               Lifting & carrying objects       Patient will carry 15# without pain (Progressing)       Start:  05/06/25    Expected End:  07/29/25               Maintaining body position       Patient will maintain standing for 2 hours without pain (Progressing)       Start:  05/06/25    Expected End:  07/29/25               Range of Motion       Patient will achieve bilateral cervical side bending ROM 30 degrees pain free (Progressing)       Start:  05/06/25    Expected End:  07/29/25            Patient will achieve bilateral cervical rotation ROM 65 degrees pain free (Progressing)        Start:  05/06/25    Expected End:  07/29/25            Patient will achieve cervical flexion ROM 35 degrees pain free (Progressing)       Start:  05/06/25    Expected End:  07/29/25            Patient will achieve cervical extension ROM 40  degrees pain free (Progressing)       Start:  05/06/25    Expected End:  07/29/25            Patient will achieve spinal flexion to 100% pain free (Progressing)       Start:  05/06/25    Expected End:  07/29/25            Patient will achieve spinal extension to 100% pain free (Progressing)       Start:  05/06/25    Expected End:  07/29/25            Patient will achieve bilateral spinal side bending % pain free  (Progressing)       Start:  05/06/25    Expected End:  07/29/25               Strength       Patient will achieve bilateral hip extension strength of 5/5 (Progressing)       Start:  05/06/25    Expected End:  07/29/25            Patient will achieve bilateral knee flexion strength of 5/5 (Progressing)       Start:  05/06/25    Expected End:  07/29/25            Patient will achieve bilateral knee extension strength of 5/5 (Progressing)       Start:  05/06/25    Expected End:  07/29/25              Resolved       Pain       Patient will report pain of 1/10 demonstrating a reduction of overall pain (Met)       Start:  05/06/25    Expected End:  07/29/25    Resolved:  05/15/25               Davi Noguera PTA

## 2025-05-21 DIAGNOSIS — F41.0 GENERALIZED ANXIETY DISORDER WITH PANIC ATTACKS: ICD-10-CM

## 2025-05-21 DIAGNOSIS — F41.1 GENERALIZED ANXIETY DISORDER WITH PANIC ATTACKS: ICD-10-CM

## 2025-05-21 RX ORDER — ALPRAZOLAM 0.25 MG/1
0.25 TABLET ORAL 2 TIMES DAILY PRN
Qty: 180 TABLET | Refills: 1 | Status: SHIPPED | OUTPATIENT
Start: 2025-05-21

## 2025-05-21 NOTE — TELEPHONE ENCOUNTER
Care Due:                  Date            Visit Type   Department     Provider  --------------------------------------------------------------------------------                                EP -                              PRIMARY      [facility] INTERNAL  Last Visit: 03-      CARE ([facility])   [facility]  Janice                              EP -                              PRIMARY      [facility] INTERNAL  Next Visit: 09-      CARE ([facility])   [facility]                                                            Last  Test          Frequency    Reason                     Performed    Due Date  --------------------------------------------------------------------------------    CBC.........  12 months..  acyclovir................  Not Found    Overdue    CMP.........  12 months..  acyclovir, furosemide....  07- 07-    [facility] Embedded Care Due Messages. Reference number: [id number].   5/21/2025 5:21:54 AM CDT

## 2025-05-21 NOTE — PROGRESS NOTES
"  Outpatient Rehab    Physical Therapy Visit    Patient Name: Val Horvath  MRN: [id number]  YOB: 1960  Encounter Date: 5/22/2025    Therapy Diagnosis:   Encounter Diagnoses   Name Primary?    Decreased range of motion of neck Yes    Decreased strength of lower extremity     Weakness of trunk musculature          Physician: Kathy Granger NP    Physician Orders: Eval and Treat  Medical Diagnosis: Acute myofascial strain of lumbar region, initial encounter  Strain of neck muscle, initial encounter  Work related injury    Physician Orders: Eval and Treat  Medical Diagnosis: Acute myofascial strain of lumbar region, initial encounter  Strain of neck muscle, initial encounter  Work related injury     Visit # / Visits Authorized:  5 / 12  Insurance Authorization Period: 5/2/2025 to 11/2/2025  Date of Evaluation: 5/6/2025  Plan of Care Certification: 5/6/2025 to 7/29/2025   Time In: 0630   Time Out: 0725  Total Time (in minutes): 55   Total Billable Time (in minutes): 55    FOTO:  Intake Score:  %  Survey Score 2:  %  Survey Score 3:  %         Subjective   Feeling much better. Ready for discharge..  Pain reported as 0/10. low back    Objective            Treatment:  Therapeutic Exercise  TE 1: Recumbent Bike 6'  TE 2: Supine trunk rotations 15x each  TE 3: Cervical range of motion each way 5x each way  TE 4: Open books 15x's each  Balance/Neuromuscular Re-Education  NMR 1: Supine Marches 30x's each  NMR 2: LTR on red ball, x 3 min  NMR 3: DKTC on red ball, 10x10"  NMR 4: Bridging 3" hold x 30  NMR 5: PPT  3" hold x 30    Time Entry(in minutes):  Neuromuscular Re-Education Time Entry: 20  Therapeutic Activity Time Entry: 25  Therapeutic Exercise Time Entry: 10    Assessment & Plan   Assessment: Patient and therapist determined that patient is appropriate for discharge at this time. Patient's HEP was reviewed and all questions answered regarding exercise emphasis and cues to recall. Patient was encouraged " to get in contact with treating therapist if questions arise.  Evaluation/Treatment Tolerance: Patient tolerated treatment well    Patient will continue to benefit from skilled outpatient physical therapy to address the deficits listed in the problem list box on initial evaluation, provide pt/family education and to maximize pt's level of independence in the home and community environment.     Patient's spiritual, cultural, and educational needs considered and patient agreeable to plan of care and goals.     Education  Education was done with Patient. The patient's learning style includes Demonstration, Listening, and Pictures/video. The patient Demonstrates understanding and Verbalizes understanding.         Educated in home exercise program.         Plan: Discharge    Goals:   Active       Range of Motion       Patient will achieve bilateral cervical side bending ROM 30 degrees pain free (Met)       Start:  05/06/25    Expected End:  07/29/25    Resolved:  05/22/25         Patient will achieve bilateral cervical rotation ROM 65 degrees pain free (Met)       Start:  05/06/25    Expected End:  07/29/25    Resolved:  05/22/25         Patient will achieve cervical flexion ROM 35 degrees pain free (Met)       Start:  05/06/25    Expected End:  07/29/25    Resolved:  05/22/25         Patient will achieve cervical extension ROM 40  degrees pain free (Met)       Start:  05/06/25    Expected End:  07/29/25    Resolved:  05/22/25         Patient will achieve spinal flexion to 100% pain free (Met)       Start:  05/06/25    Expected End:  07/29/25    Resolved:  05/22/25         Patient will achieve spinal extension to 100% pain free (Progressing)       Start:  05/06/25    Expected End:  07/29/25            Patient will achieve bilateral spinal side bending % pain free  (Met)       Start:  05/06/25    Expected End:  07/29/25    Resolved:  05/22/25            Strength       Patient will achieve bilateral hip extension  strength of 5/5 (Met)       Start:  05/06/25    Expected End:  07/29/25    Resolved:  05/22/25         Patient will achieve bilateral knee flexion strength of 5/5 (Met)       Start:  05/06/25    Expected End:  07/29/25    Resolved:  05/22/25         Patient will achieve bilateral knee extension strength of 5/5 (Met)       Start:  05/06/25    Expected End:  07/29/25    Resolved:  05/22/25           Resolved       Ambulation/movement       Patient will walk 1 mile without pain (Met)       Start:  05/06/25    Expected End:  07/29/25    Resolved:  05/22/25            Functional outcome       Patient will show a significant change in FOTO patient-reported outcome tool to demonstrate subjective improvement (Met)       Start:  05/06/25    Expected End:  07/29/25    Resolved:  05/22/25         Patient stated goal:   Would like to be able to work without pain or restrictions (Met)       Start:  05/06/25    Expected End:  07/29/25    Resolved:  05/22/25         Patient will demonstrate independence in home program for support of progression (Met)       Start:  05/06/25    Expected End:  07/29/25    Resolved:  05/22/25            Lifting & carrying objects       Patient will carry 15# without pain (Met)       Start:  05/06/25    Expected End:  07/29/25    Resolved:  05/22/25            Maintaining body position       Patient will maintain standing for 2 hours without pain (Met)       Start:  05/06/25    Expected End:  07/29/25    Resolved:  05/22/25            Pain       Patient will report pain of 1/10 demonstrating a reduction of overall pain (Met)       Start:  05/06/25    Expected End:  07/29/25    Resolved:  05/15/25                 Alonso Griffith PT, DPT

## 2025-05-22 ENCOUNTER — CLINICAL SUPPORT (OUTPATIENT)
Dept: REHABILITATION | Facility: OTHER | Age: 65
End: 2025-05-22
Payer: OTHER MISCELLANEOUS

## 2025-05-22 DIAGNOSIS — R29.898 DECREASED STRENGTH OF LOWER EXTREMITY: ICD-10-CM

## 2025-05-22 DIAGNOSIS — R29.898 DECREASED RANGE OF MOTION OF NECK: Primary | ICD-10-CM

## 2025-05-22 DIAGNOSIS — M62.81 WEAKNESS OF TRUNK MUSCULATURE: ICD-10-CM

## 2025-05-22 PROCEDURE — 97110 THERAPEUTIC EXERCISES: CPT | Mod: PN

## 2025-05-22 PROCEDURE — 97530 THERAPEUTIC ACTIVITIES: CPT | Mod: PN

## 2025-05-22 PROCEDURE — 97112 NEUROMUSCULAR REEDUCATION: CPT | Mod: PN

## 2025-05-23 ENCOUNTER — OFFICE VISIT (OUTPATIENT)
Dept: URGENT CARE | Facility: CLINIC | Age: 65
End: 2025-05-23
Payer: OTHER MISCELLANEOUS

## 2025-05-23 VITALS
BODY MASS INDEX: 29.07 KG/M2 | OXYGEN SATURATION: 99 % | RESPIRATION RATE: 19 BRPM | WEIGHT: 154 LBS | HEIGHT: 61 IN | HEART RATE: 77 BPM | DIASTOLIC BLOOD PRESSURE: 71 MMHG | SYSTOLIC BLOOD PRESSURE: 139 MMHG | TEMPERATURE: 98 F

## 2025-05-23 DIAGNOSIS — S16.1XXA STRAIN OF NECK MUSCLE, INITIAL ENCOUNTER: ICD-10-CM

## 2025-05-23 DIAGNOSIS — S39.012A ACUTE MYOFASCIAL STRAIN OF LUMBAR REGION, INITIAL ENCOUNTER: ICD-10-CM

## 2025-05-23 DIAGNOSIS — S80.02XA CONTUSION OF LEFT KNEE AND LOWER LEG, INITIAL ENCOUNTER: ICD-10-CM

## 2025-05-23 DIAGNOSIS — Z02.6 ENCOUNTER RELATED TO WORKER'S COMPENSATION CLAIM: ICD-10-CM

## 2025-05-23 DIAGNOSIS — S06.0X0A CONCUSSION WITHOUT LOSS OF CONSCIOUSNESS, INITIAL ENCOUNTER: Primary | ICD-10-CM
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
## 2025-05-23 DIAGNOSIS — S80.12XA CONTUSION OF LEFT KNEE AND LOWER LEG, INITIAL ENCOUNTER: ICD-10-CM
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
## 2025-05-23 DIAGNOSIS — S90.02XA CONTUSION OF LEFT ANKLE, INITIAL ENCOUNTER: ICD-10-CM

## 2025-05-23 DIAGNOSIS — S50.12XA CONTUSION OF LEFT FOREARM, INITIAL ENCOUNTER: ICD-10-CM

## 2025-05-23 DIAGNOSIS — T07.XXXA MULTIPLE INJURIES DUE TO TRAUMA: ICD-10-CM
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
## 2025-06-05 ENCOUNTER — OFFICE VISIT (OUTPATIENT)
Dept: PAIN MEDICINE | Facility: CLINIC | Age: 65
End: 2025-06-05
Attending: ANESTHESIOLOGY
Payer: COMMERCIAL

## 2025-06-05 ENCOUNTER — TELEPHONE (OUTPATIENT)
Dept: PAIN MEDICINE | Facility: CLINIC | Age: 65
End: 2025-06-05

## 2025-06-05 DIAGNOSIS — M79.18 PIRIFORMIS MUSCLE PAIN: ICD-10-CM

## 2025-06-05 DIAGNOSIS — G89.4 CHRONIC PAIN SYNDROME: ICD-10-CM

## 2025-06-05 DIAGNOSIS — M70.62 GREATER TROCHANTERIC BURSITIS OF LEFT HIP: Primary | ICD-10-CM

## 2025-06-05 DIAGNOSIS — M79.18 MYOFASCIAL PAIN: ICD-10-CM

## 2025-06-05 DIAGNOSIS — M70.61 GREATER TROCHANTERIC BURSITIS OF RIGHT HIP: ICD-10-CM

## 2025-06-05 PROCEDURE — 3044F HG A1C LEVEL LT 7.0%: CPT | Mod: CPTII,95,,

## 2025-06-05 PROCEDURE — 1159F MED LIST DOCD IN RCRD: CPT | Mod: CPTII,95,,

## 2025-06-05 PROCEDURE — 98004 SYNCH AUDIO-VIDEO EST SF 10: CPT | Mod: 95,,,

## 2025-06-05 PROCEDURE — 1160F RVW MEDS BY RX/DR IN RCRD: CPT | Mod: CPTII,95,,

## 2025-06-18 ENCOUNTER — TELEPHONE (OUTPATIENT)
Dept: GASTROENTEROLOGY | Facility: CLINIC | Age: 65
End: 2025-06-18
Payer: COMMERCIAL

## 2025-06-18 NOTE — TELEPHONE ENCOUNTER
Spoke with patient. She is not having any GI issues. She just had some questions which I answered for her.

## 2025-06-18 NOTE — TELEPHONE ENCOUNTER
----- Message from Carmelo sent at 6/18/2025 10:14 AM CDT -----  Regarding: SCHEDULE  Contact: [phone]  Questions: Gastro pt    Patient contact num: [phone]    Pt is requesting a call back to speak  to someone in this department to schedule with .6/18 BW

## 2025-06-24 ENCOUNTER — TELEPHONE (OUTPATIENT)
Dept: PAIN MEDICINE | Facility: CLINIC | Age: 65
End: 2025-06-24
Payer: COMMERCIAL

## 2025-06-24 RX ORDER — IBUPROFEN 600 MG/1
600 TABLET, FILM COATED ORAL EVERY 6 HOURS PRN
Qty: 30 TABLET | Refills: 0 | Status: SHIPPED | OUTPATIENT
Start: 2025-06-24

## 2025-06-24 NOTE — TELEPHONE ENCOUNTER
Patient sent in a refill request for ibuprofen advil motrin, in patients note from NP Kathy Granger she states the patient cannot take NSAIDS. Staff secured marsha hodges for clarification before pending medication.

## 2025-06-24 NOTE — TELEPHONE ENCOUNTER
Copied from CRM #[id number]. Topic: Medications - Medication Refill  >> Jun 24, 2025  9:53 AM Summer wrote:  Type: RX Refill Request    Who Called: pt    Have you contacted your pharmacy:yes    Refill or New Rx:refill    RX Name and Strength:ibuprofen (ADVIL,MOTRIN) 800 MG tablet    How is the patient currently taking it? (ex. 1XDay):    Is this a 30 day or 90 day RX:    Preferred Pharmacy with phone number:[id number] [place], [place] - [address] [address] [facility]    Local or Mail Order:local    Ordering Provider:    Would the patient rather a call back or a response via [facility]? call    Best Call Back Number:[phone]    Additional Information:

## 2025-08-05 ENCOUNTER — OFFICE VISIT (OUTPATIENT)
Dept: URGENT CARE | Facility: CLINIC | Age: 65
End: 2025-08-05
Payer: COMMERCIAL

## 2025-08-05 VITALS
SYSTOLIC BLOOD PRESSURE: 135 MMHG | HEART RATE: 68 BPM | HEIGHT: 61 IN | BODY MASS INDEX: 29.47 KG/M2 | TEMPERATURE: 98 F | RESPIRATION RATE: 18 BRPM | OXYGEN SATURATION: 96 % | DIASTOLIC BLOOD PRESSURE: 65 MMHG | WEIGHT: 156.06 LBS

## 2025-08-05 DIAGNOSIS — H01.134 ECZEMATOUS DERMATITIS OF UPPER EYELIDS OF BOTH EYES: Primary | ICD-10-CM

## 2025-08-05 DIAGNOSIS — H01.131 ECZEMATOUS DERMATITIS OF UPPER EYELIDS OF BOTH EYES: Primary | ICD-10-CM

## 2025-08-05 PROCEDURE — 99213 OFFICE O/P EST LOW 20 MIN: CPT | Mod: S$GLB,,,

## 2025-08-05 RX ORDER — HYDROCORTISONE 1 %
CREAM (GRAM) TOPICAL 2 TIMES DAILY
Qty: 26 G | Refills: 0 | Status: SHIPPED | OUTPATIENT
Start: 2025-08-05 | End: 2025-08-12

## 2025-08-05 NOTE — PROGRESS NOTES
"Subjective:      Patient ID: Val Horvath is a 64 y.o. female.    Vitals:  height is 5' 1" (1.549 m) and weight is 70.8 kg (156 lb 1.4 oz). Her oral temperature is 98.2 °F (36.8 °C). Her blood pressure is 135/65 and her pulse is 68. Her respiration is 18 and oxygen saturation is 96%.     Chief Complaint: Eye Problem    Val Horvath is a 64 y.o. female c/o bilateral eyelid itching. Sx began a few months ago was tx with an ointment that hasn't helped "it was slimey, and going into eye".  Patient states that the itching comes and goes, she does take daily Zyrtec.  No other rash, eye pain, vision changes, cold symptoms, or other associated concerns.    Eye Problem   Associated symptoms include itching (Eyelids). Pertinent negatives include no blurred vision or fever.       Constitution: Negative for fever and generalized weakness.   HENT:  Negative for ear pain, sinus pain and sore throat.    Neck: Negative for neck pain.   Cardiovascular:  Negative for chest pain.   Eyes:  Positive for eye itching (Eyelids). Negative for blurred vision and eyelid swelling.   Respiratory:  Negative for cough and shortness of breath.    Gastrointestinal:  Negative for abdominal pain.   Neurological:  Negative for headaches.      Objective:     Physical Exam   Constitutional: She is oriented to person, place, and time. She appears well-developed. She is cooperative.   HENT:   Head: Normocephalic and atraumatic.   Ears:   Right Ear: Hearing, tympanic membrane, external ear and ear canal normal.   Left Ear: Hearing, tympanic membrane, external ear and ear canal normal.   Nose: Nose normal. No mucosal edema or nasal deformity. No epistaxis. Right sinus exhibits no maxillary sinus tenderness and no frontal sinus tenderness. Left sinus exhibits no maxillary sinus tenderness and no frontal sinus tenderness.   Mouth/Throat: Uvula is midline, oropharynx is clear and moist and mucous membranes are normal. No trismus in the jaw. Normal " dentition. No uvula swelling.   Eyes: Conjunctivae and lids are normal.   Neck: Trachea normal and phonation normal. Neck supple.   Cardiovascular: Normal rate, regular rhythm, normal heart sounds and normal pulses.   Pulmonary/Chest: Effort normal and breath sounds normal.   Abdominal: Normal appearance and bowel sounds are normal. Soft.   Musculoskeletal: Normal range of motion.         General: Normal range of motion.   Neurological: She is alert and oriented to person, place, and time. She exhibits normal muscle tone.   Skin: Skin is warm, dry, intact and rash (slightly dry skin, mild erythema to upper eye lids).   Psychiatric: Her speech is normal and behavior is normal. Judgment and thought content normal.   Nursing note and vitals reviewed.      Assessment:     1. Eczematous dermatitis of upper eyelids of both eyes        Plan:       Eczematous dermatitis of upper eyelids of both eyes    Other orders  -     hydrocortisone 1 % cream; Apply topically 2 (two) times daily. for 7 days  Dispense: 26 g; Refill: 0

## 2025-08-12 ENCOUNTER — TELEPHONE (OUTPATIENT)
Dept: INTERNAL MEDICINE | Facility: CLINIC | Age: 65
End: 2025-08-12
Payer: COMMERCIAL

## 2025-08-12 DIAGNOSIS — Z12.31 ENCOUNTER FOR SCREENING MAMMOGRAM FOR BREAST CANCER: ICD-10-CM

## 2025-08-14 ENCOUNTER — PROCEDURE VISIT (OUTPATIENT)
Dept: PAIN MEDICINE | Facility: CLINIC | Age: 65
End: 2025-08-14
Attending: ANESTHESIOLOGY
Payer: COMMERCIAL

## 2025-08-14 VITALS
TEMPERATURE: 98 F | BODY MASS INDEX: 29.72 KG/M2 | HEART RATE: 72 BPM | DIASTOLIC BLOOD PRESSURE: 68 MMHG | WEIGHT: 157.44 LBS | OXYGEN SATURATION: 95 % | SYSTOLIC BLOOD PRESSURE: 128 MMHG | HEIGHT: 61 IN

## 2025-08-14 DIAGNOSIS — M70.60 GREATER TROCHANTERIC BURSITIS, UNSPECIFIED LATERALITY: Primary | ICD-10-CM

## 2025-08-14 PROCEDURE — 20611 DRAIN/INJ JOINT/BURSA W/US: CPT | Mod: LT,S$GLB,, | Performed by: ANESTHESIOLOGY

## 2025-08-29 ENCOUNTER — HOSPITAL ENCOUNTER (OUTPATIENT)
Dept: RADIOLOGY | Facility: OTHER | Age: 65
Discharge: HOME | End: 2025-08-29
Attending: HOSPITALIST
Payer: COMMERCIAL